# Patient Record
Sex: MALE | Race: ASIAN | NOT HISPANIC OR LATINO | Employment: UNEMPLOYED | URBAN - METROPOLITAN AREA
[De-identification: names, ages, dates, MRNs, and addresses within clinical notes are randomized per-mention and may not be internally consistent; named-entity substitution may affect disease eponyms.]

---

## 2020-09-09 ENCOUNTER — APPOINTMENT (OUTPATIENT)
Dept: CT IMAGING | Facility: CLINIC | Age: 45
DRG: 683 | End: 2020-09-09
Attending: EMERGENCY MEDICINE

## 2020-09-09 ENCOUNTER — HOSPITAL ENCOUNTER (INPATIENT)
Facility: CLINIC | Age: 45
LOS: 4 days | Discharge: HOME | DRG: 683 | End: 2020-09-13
Attending: EMERGENCY MEDICINE | Admitting: INTERNAL MEDICINE

## 2020-09-09 ENCOUNTER — OFFICE VISIT (OUTPATIENT)
Dept: URGENT CARE | Facility: URGENT CARE | Age: 45
End: 2020-09-09

## 2020-09-09 VITALS
OXYGEN SATURATION: 100 % | SYSTOLIC BLOOD PRESSURE: 160 MMHG | WEIGHT: 119.4 LBS | TEMPERATURE: 97.5 F | HEART RATE: 70 BPM | DIASTOLIC BLOOD PRESSURE: 140 MMHG

## 2020-09-09 DIAGNOSIS — I16.0 HYPERTENSIVE URGENCY: ICD-10-CM

## 2020-09-09 DIAGNOSIS — N19 RENAL FAILURE, UNSPECIFIED CHRONICITY: ICD-10-CM

## 2020-09-09 DIAGNOSIS — I10 HYPERTENSION, UNSPECIFIED TYPE: Primary | ICD-10-CM

## 2020-09-09 LAB
ANION GAP SERPL CALCULATED.3IONS-SCNC: 11 MMOL/L (ref 3–14)
ANION GAP SERPL CALCULATED.3IONS-SCNC: 5 MMOL/L (ref 3–14)
BASOPHILS # BLD AUTO: 0 10E9/L (ref 0–0.2)
BASOPHILS NFR BLD AUTO: 0.2 %
BUN SERPL-MCNC: 54 MG/DL (ref 7–30)
BUN SERPL-MCNC: 56 MG/DL (ref 7–30)
CALCIUM SERPL-MCNC: 8.4 MG/DL (ref 8.5–10.1)
CALCIUM SERPL-MCNC: 8.9 MG/DL (ref 8.5–10.1)
CHLORIDE SERPL-SCNC: 105 MMOL/L (ref 94–109)
CHLORIDE SERPL-SCNC: 107 MMOL/L (ref 94–109)
CO2 SERPL-SCNC: 23 MMOL/L (ref 20–32)
CO2 SERPL-SCNC: 28 MMOL/L (ref 20–32)
CREAT SERPL-MCNC: 5.39 MG/DL (ref 0.66–1.25)
CREAT SERPL-MCNC: 5.49 MG/DL (ref 0.66–1.25)
DIFFERENTIAL METHOD BLD: ABNORMAL
EOSINOPHIL # BLD AUTO: 0.2 10E9/L (ref 0–0.7)
EOSINOPHIL NFR BLD AUTO: 2.1 %
ERYTHROCYTE [DISTWIDTH] IN BLOOD BY AUTOMATED COUNT: 12.2 % (ref 10–15)
GFR SERPL CREATININE-BSD FRML MDRD: 11 ML/MIN/{1.73_M2}
GFR SERPL CREATININE-BSD FRML MDRD: 12 ML/MIN/{1.73_M2}
GLUCOSE SERPL-MCNC: 108 MG/DL (ref 70–99)
GLUCOSE SERPL-MCNC: 167 MG/DL (ref 70–99)
HCT VFR BLD AUTO: 32.4 % (ref 40–53)
HGB BLD-MCNC: 10.7 G/DL (ref 13.3–17.7)
IMM GRANULOCYTES # BLD: 0 10E9/L (ref 0–0.4)
IMM GRANULOCYTES NFR BLD: 0.2 %
LYMPHOCYTES # BLD AUTO: 2.4 10E9/L (ref 0.8–5.3)
LYMPHOCYTES NFR BLD AUTO: 30.2 %
MCH RBC QN AUTO: 27.9 PG (ref 26.5–33)
MCHC RBC AUTO-ENTMCNC: 33 G/DL (ref 31.5–36.5)
MCV RBC AUTO: 84 FL (ref 78–100)
MONOCYTES # BLD AUTO: 0.4 10E9/L (ref 0–1.3)
MONOCYTES NFR BLD AUTO: 5.5 %
NEUTROPHILS # BLD AUTO: 5 10E9/L (ref 1.6–8.3)
NEUTROPHILS NFR BLD AUTO: 61.8 %
NRBC # BLD AUTO: 0 10*3/UL
NRBC BLD AUTO-RTO: 0 /100
PLATELET # BLD AUTO: 185 10E9/L (ref 150–450)
POTASSIUM SERPL-SCNC: 3.9 MMOL/L (ref 3.4–5.3)
POTASSIUM SERPL-SCNC: 4.5 MMOL/L (ref 3.4–5.3)
RBC # BLD AUTO: 3.84 10E12/L (ref 4.4–5.9)
SODIUM SERPL-SCNC: 138 MMOL/L (ref 133–144)
SODIUM SERPL-SCNC: 141 MMOL/L (ref 133–144)
TROPONIN I SERPL-MCNC: <0.015 UG/L (ref 0–0.04)
WBC # BLD AUTO: 8.1 10E9/L (ref 4–11)

## 2020-09-09 PROCEDURE — 99285 EMERGENCY DEPT VISIT HI MDM: CPT | Mod: 25

## 2020-09-09 PROCEDURE — 93005 ELECTROCARDIOGRAM TRACING: CPT

## 2020-09-09 PROCEDURE — 70450 CT HEAD/BRAIN W/O DYE: CPT

## 2020-09-09 PROCEDURE — 36415 COLL VENOUS BLD VENIPUNCTURE: CPT | Performed by: FAMILY MEDICINE

## 2020-09-09 PROCEDURE — 80048 BASIC METABOLIC PNL TOTAL CA: CPT | Performed by: FAMILY MEDICINE

## 2020-09-09 PROCEDURE — 85025 COMPLETE CBC W/AUTO DIFF WBC: CPT | Performed by: EMERGENCY MEDICINE

## 2020-09-09 PROCEDURE — 80048 BASIC METABOLIC PNL TOTAL CA: CPT | Performed by: EMERGENCY MEDICINE

## 2020-09-09 PROCEDURE — 25000128 H RX IP 250 OP 636: Performed by: EMERGENCY MEDICINE

## 2020-09-09 PROCEDURE — 99223 1ST HOSP IP/OBS HIGH 75: CPT | Mod: AI | Performed by: INTERNAL MEDICINE

## 2020-09-09 PROCEDURE — C9803 HOPD COVID-19 SPEC COLLECT: HCPCS

## 2020-09-09 PROCEDURE — 84484 ASSAY OF TROPONIN QUANT: CPT | Performed by: EMERGENCY MEDICINE

## 2020-09-09 PROCEDURE — U0003 INFECTIOUS AGENT DETECTION BY NUCLEIC ACID (DNA OR RNA); SEVERE ACUTE RESPIRATORY SYNDROME CORONAVIRUS 2 (SARS-COV-2) (CORONAVIRUS DISEASE [COVID-19]), AMPLIFIED PROBE TECHNIQUE, MAKING USE OF HIGH THROUGHPUT TECHNOLOGIES AS DESCRIBED BY CMS-2020-01-R: HCPCS | Performed by: EMERGENCY MEDICINE

## 2020-09-09 PROCEDURE — 96374 THER/PROPH/DIAG INJ IV PUSH: CPT

## 2020-09-09 PROCEDURE — 12000000 ZZH R&B MED SURG/OB

## 2020-09-09 PROCEDURE — 99207 ZZC OFFICE-HOSPITAL ADMIT: CPT | Performed by: FAMILY MEDICINE

## 2020-09-09 RX ORDER — LIDOCAINE 40 MG/G
CREAM TOPICAL
Status: DISCONTINUED | OUTPATIENT
Start: 2020-09-09 | End: 2020-09-13 | Stop reason: HOSPADM

## 2020-09-09 RX ORDER — SODIUM CHLORIDE, SODIUM LACTATE, POTASSIUM CHLORIDE, CALCIUM CHLORIDE 600; 310; 30; 20 MG/100ML; MG/100ML; MG/100ML; MG/100ML
INJECTION, SOLUTION INTRAVENOUS CONTINUOUS
Status: DISCONTINUED | OUTPATIENT
Start: 2020-09-10 | End: 2020-09-10

## 2020-09-09 RX ORDER — ACETAMINOPHEN 325 MG/1
325-650 TABLET ORAL EVERY 6 HOURS PRN
Status: ON HOLD | COMMUNITY
End: 2021-05-09

## 2020-09-09 RX ORDER — LABETALOL HYDROCHLORIDE 5 MG/ML
20 INJECTION, SOLUTION INTRAVENOUS ONCE
Status: COMPLETED | OUTPATIENT
Start: 2020-09-09 | End: 2020-09-09

## 2020-09-09 RX ORDER — HEPARIN SODIUM 5000 [USP'U]/.5ML
5000 INJECTION, SOLUTION INTRAVENOUS; SUBCUTANEOUS EVERY 12 HOURS
Status: DISCONTINUED | OUTPATIENT
Start: 2020-09-10 | End: 2020-09-13 | Stop reason: HOSPADM

## 2020-09-09 RX ORDER — HYDRALAZINE HYDROCHLORIDE 20 MG/ML
10 INJECTION INTRAMUSCULAR; INTRAVENOUS EVERY 4 HOURS PRN
Status: DISCONTINUED | OUTPATIENT
Start: 2020-09-09 | End: 2020-09-10

## 2020-09-09 RX ORDER — ONDANSETRON 4 MG/1
4 TABLET, ORALLY DISINTEGRATING ORAL EVERY 6 HOURS PRN
Status: DISCONTINUED | OUTPATIENT
Start: 2020-09-09 | End: 2020-09-13 | Stop reason: HOSPADM

## 2020-09-09 RX ORDER — NALOXONE HYDROCHLORIDE 0.4 MG/ML
.1-.4 INJECTION, SOLUTION INTRAMUSCULAR; INTRAVENOUS; SUBCUTANEOUS
Status: DISCONTINUED | OUTPATIENT
Start: 2020-09-09 | End: 2020-09-13 | Stop reason: HOSPADM

## 2020-09-09 RX ORDER — ACETAMINOPHEN 650 MG/1
650 SUPPOSITORY RECTAL EVERY 4 HOURS PRN
Status: DISCONTINUED | OUTPATIENT
Start: 2020-09-09 | End: 2020-09-13 | Stop reason: HOSPADM

## 2020-09-09 RX ORDER — ACETAMINOPHEN 325 MG/1
650 TABLET ORAL EVERY 4 HOURS PRN
Status: DISCONTINUED | OUTPATIENT
Start: 2020-09-09 | End: 2020-09-13 | Stop reason: HOSPADM

## 2020-09-09 RX ORDER — ONDANSETRON 2 MG/ML
4 INJECTION INTRAMUSCULAR; INTRAVENOUS EVERY 6 HOURS PRN
Status: DISCONTINUED | OUTPATIENT
Start: 2020-09-09 | End: 2020-09-13 | Stop reason: HOSPADM

## 2020-09-09 RX ORDER — AMLODIPINE BESYLATE 5 MG/1
5 TABLET ORAL DAILY
Status: DISCONTINUED | OUTPATIENT
Start: 2020-09-10 | End: 2020-09-12

## 2020-09-09 RX ORDER — LISINOPRIL 5 MG/1
5 TABLET ORAL DAILY
Qty: 30 TABLET | Refills: 1 | Status: ON HOLD | OUTPATIENT
Start: 2020-09-09 | End: 2020-09-12

## 2020-09-09 RX ORDER — NAPROXEN SODIUM 220 MG
220 TABLET ORAL 2 TIMES DAILY PRN
Status: ON HOLD | COMMUNITY
End: 2020-09-11

## 2020-09-09 RX ADMIN — LABETALOL HYDROCHLORIDE 10 MG: 5 INJECTION, SOLUTION INTRAVENOUS at 22:20

## 2020-09-09 SDOH — HEALTH STABILITY: MENTAL HEALTH: HOW OFTEN DO YOU HAVE A DRINK CONTAINING ALCOHOL?: NEVER

## 2020-09-09 ASSESSMENT — ENCOUNTER SYMPTOMS
SPEECH DIFFICULTY: 0
HEADACHES: 1
MYALGIAS: 1
WEAKNESS: 0
NUMBNESS: 0
ABDOMINAL PAIN: 0
SHORTNESS OF BREATH: 0
TROUBLE SWALLOWING: 0

## 2020-09-09 ASSESSMENT — PAIN SCALES - GENERAL: PAINLEVEL: SEVERE PAIN (6)

## 2020-09-09 NOTE — PROGRESS NOTES
"SUBJECTIVE:   Johnny Bingham is a 44 year old male presenting with a chief complaint of   Chief Complaint   Patient presents with     Headache     last week     Eye Problem     EYE PAIN      Hx obtained from a [place] .       New patient to our clinic.       44-year-old male presenting today with concern over headache, \"tired eyes\" and shoulder pain.  The shoulder pain appears to be along the trapezius area bilaterally seems to be consistent over the past 7 days and not changing much in character.  He has been using Tylenol and Aleve.Also noted a extremely high BP of 249/155. Has not seen a doctor in at least November for \"drinking too much alcohol\"      Does not have health insurance until November.       Denies ETOH   Quit smoking 6 months ago -- over twenty years.   Does drink coffee     Denies drug use or alcohol.     Denies any history of allergies      Review of Systems    History reviewed. No pertinent past medical history.  History reviewed. No pertinent family history.  No current outpatient medications on file.     Social History     Tobacco Use     Smoking status: Former Smoker     Smokeless tobacco: Never Used     Tobacco comment: quit 6 months ago    Substance Use Topics     Alcohol use: Never     Frequency: Never         BP (!) 160/140   Pulse 70   Temp 97.5  F (36.4  C) (Tympanic)   Wt 54.2 kg (119 lb 6.4 oz)   SpO2 100%    OBJECTIVE  BP (!) 249/155   Pulse 70   Temp 97.5  F (36.4  C) (Tympanic)   Wt 54.2 kg (119 lb 6.4 oz)   SpO2 100%     Physical Exam  HENT:      Head: Normocephalic and atraumatic.      Right Ear: External ear normal.      Left Ear: External ear normal.      Nose: Nose normal.      Mouth/Throat:      Pharynx: No oropharyngeal exudate.   Eyes:      General: Lids are normal. No scleral icterus.        Right eye: No discharge.         Left eye: No discharge.      Extraocular Movements: Extraocular movements intact.      Conjunctiva/sclera: Conjunctivae normal.     "  Pupils: Pupils are equal, round, and reactive to light.   Neck:      Musculoskeletal: Neck supple.      Thyroid: No thyromegaly.      Trachea: No tracheal deviation.   Cardiovascular:      Rate and Rhythm: Normal rate and regular rhythm.      Heart sounds: Normal heart sounds. No murmur. No friction rub. No gallop.    Pulmonary:      Effort: Pulmonary effort is normal. No respiratory distress.      Breath sounds: Normal breath sounds. No stridor. No wheezing or rales.   Chest:      Chest wall: No tenderness.   Abdominal:      General: Bowel sounds are normal. There is no distension.      Palpations: Abdomen is soft. There is no mass.      Tenderness: There is no abdominal tenderness. There is no guarding or rebound.   Musculoskeletal:         General: No tenderness or deformity.      Comments: Muscle tenderness bilaterally at the trapezius   Lymphadenopathy:      Cervical: No cervical adenopathy.   Skin:     General: Skin is warm and dry.      Findings: No erythema or rash.   Neurological:      Mental Status: He is alert and oriented to person, place, and time.      Cranial Nerves: No cranial nerve deficit.   Psychiatric:         Judgment: Judgment normal.              ASSESSMENT:    ICD-10-CM    1. Hypertension, unspecified type  I10 lisinopril (ZESTRIL) 5 MG tablet     Basic metabolic panel  (Ca, Cl, CO2, Creat, Gluc, K, Na, BUN)      PLAN:  Primary care follow-up within the week.   I did encourage timely follow-up for any increasing pain directly to the ER.  Encourage daily blood pressure checks from home needed follow-up with a blood pressure remains elevated to him.  Did markedly improve during his stay in the clinic is likely falsely elevated initially due to unknown factors.  Will monitor closely and start him on blood pressure medicine had poor continuity care for many years due to noncompliance.   The patient indicates understanding of these issues and agrees with the plan.   Patient  educational/instructional material provided including reasons for follow-up   Jed Silvestre MD

## 2020-09-10 ENCOUNTER — APPOINTMENT (OUTPATIENT)
Dept: ULTRASOUND IMAGING | Facility: CLINIC | Age: 45
DRG: 683 | End: 2020-09-10
Attending: INTERNAL MEDICINE

## 2020-09-10 LAB
ALBUMIN UR-MCNC: 30 MG/DL
ANION GAP SERPL CALCULATED.3IONS-SCNC: 5 MMOL/L (ref 3–14)
APPEARANCE UR: CLEAR
BILIRUB UR QL STRIP: NEGATIVE
BUN SERPL-MCNC: 52 MG/DL (ref 7–30)
CALCIUM SERPL-MCNC: 8 MG/DL (ref 8.5–10.1)
CHLORIDE SERPL-SCNC: 110 MMOL/L (ref 94–109)
CO2 SERPL-SCNC: 25 MMOL/L (ref 20–32)
COLOR UR AUTO: ABNORMAL
CREAT SERPL-MCNC: 5.37 MG/DL (ref 0.66–1.25)
CREAT UR-MCNC: 40 MG/DL
ERYTHROCYTE [DISTWIDTH] IN BLOOD BY AUTOMATED COUNT: 12.2 % (ref 10–15)
FRACT EXCRET NA UR+SERPL-RTO: 10.7 %
GFR SERPL CREATININE-BSD FRML MDRD: 12 ML/MIN/{1.73_M2}
GLUCOSE SERPL-MCNC: 101 MG/DL (ref 70–99)
GLUCOSE UR STRIP-MCNC: NEGATIVE MG/DL
HCT VFR BLD AUTO: 29 % (ref 40–53)
HGB BLD-MCNC: 9.4 G/DL (ref 13.3–17.7)
HGB UR QL STRIP: ABNORMAL
INTERPRETATION ECG - MUSE: NORMAL
KETONES UR STRIP-MCNC: NEGATIVE MG/DL
LEUKOCYTE ESTERASE UR QL STRIP: NEGATIVE
MCH RBC QN AUTO: 27.4 PG (ref 26.5–33)
MCHC RBC AUTO-ENTMCNC: 32.4 G/DL (ref 31.5–36.5)
MCV RBC AUTO: 85 FL (ref 78–100)
NITRATE UR QL: NEGATIVE
PH UR STRIP: 7 PH (ref 5–7)
PLATELET # BLD AUTO: 164 10E9/L (ref 150–450)
POTASSIUM SERPL-SCNC: 4.7 MMOL/L (ref 3.4–5.3)
RBC # BLD AUTO: 3.43 10E12/L (ref 4.4–5.9)
RBC #/AREA URNS AUTO: 26 /HPF (ref 0–2)
SODIUM SERPL-SCNC: 140 MMOL/L (ref 133–144)
SODIUM UR-SCNC: 110 MMOL/L
SOURCE: ABNORMAL
SP GR UR STRIP: 1.01 (ref 1–1.03)
UROBILINOGEN UR STRIP-MCNC: NORMAL MG/DL (ref 0–2)
WBC # BLD AUTO: 8 10E9/L (ref 4–11)
WBC #/AREA URNS AUTO: <1 /HPF (ref 0–5)

## 2020-09-10 PROCEDURE — 12000000 ZZH R&B MED SURG/OB

## 2020-09-10 PROCEDURE — 25000128 H RX IP 250 OP 636: Performed by: INTERNAL MEDICINE

## 2020-09-10 PROCEDURE — 99232 SBSQ HOSP IP/OBS MODERATE 35: CPT | Performed by: HOSPITALIST

## 2020-09-10 PROCEDURE — 36415 COLL VENOUS BLD VENIPUNCTURE: CPT | Performed by: INTERNAL MEDICINE

## 2020-09-10 PROCEDURE — 80048 BASIC METABOLIC PNL TOTAL CA: CPT | Performed by: INTERNAL MEDICINE

## 2020-09-10 PROCEDURE — 25000132 ZZH RX MED GY IP 250 OP 250 PS 637: Performed by: INTERNAL MEDICINE

## 2020-09-10 PROCEDURE — 25800030 ZZH RX IP 258 OP 636: Performed by: INTERNAL MEDICINE

## 2020-09-10 PROCEDURE — 84156 ASSAY OF PROTEIN URINE: CPT | Performed by: INTERNAL MEDICINE

## 2020-09-10 PROCEDURE — 81001 URINALYSIS AUTO W/SCOPE: CPT | Performed by: INTERNAL MEDICINE

## 2020-09-10 PROCEDURE — 76770 US EXAM ABDO BACK WALL COMP: CPT

## 2020-09-10 PROCEDURE — 82570 ASSAY OF URINE CREATININE: CPT | Performed by: INTERNAL MEDICINE

## 2020-09-10 PROCEDURE — 25000132 ZZH RX MED GY IP 250 OP 250 PS 637: Performed by: HOSPITALIST

## 2020-09-10 PROCEDURE — 85027 COMPLETE CBC AUTOMATED: CPT | Performed by: INTERNAL MEDICINE

## 2020-09-10 PROCEDURE — 84300 ASSAY OF URINE SODIUM: CPT | Performed by: INTERNAL MEDICINE

## 2020-09-10 RX ORDER — PROCHLORPERAZINE MALEATE 10 MG
10 TABLET ORAL EVERY 6 HOURS PRN
Status: DISCONTINUED | OUTPATIENT
Start: 2020-09-10 | End: 2020-09-13 | Stop reason: HOSPADM

## 2020-09-10 RX ORDER — HYDROCODONE BITARTRATE AND ACETAMINOPHEN 5; 325 MG/1; MG/1
1 TABLET ORAL EVERY 6 HOURS PRN
Status: DISCONTINUED | OUTPATIENT
Start: 2020-09-10 | End: 2020-09-13 | Stop reason: HOSPADM

## 2020-09-10 RX ORDER — HYDRALAZINE HYDROCHLORIDE 20 MG/ML
20 INJECTION INTRAMUSCULAR; INTRAVENOUS EVERY 4 HOURS PRN
Status: DISCONTINUED | OUTPATIENT
Start: 2020-09-10 | End: 2020-09-11

## 2020-09-10 RX ORDER — PROCHLORPERAZINE 25 MG
25 SUPPOSITORY, RECTAL RECTAL EVERY 12 HOURS PRN
Status: DISCONTINUED | OUTPATIENT
Start: 2020-09-10 | End: 2020-09-13 | Stop reason: HOSPADM

## 2020-09-10 RX ADMIN — ACETAMINOPHEN 650 MG: 325 TABLET, FILM COATED ORAL at 21:08

## 2020-09-10 RX ADMIN — HYDROCODONE BITARTRATE AND ACETAMINOPHEN 1 TABLET: 5; 325 TABLET ORAL at 09:30

## 2020-09-10 RX ADMIN — HYDROCODONE BITARTRATE AND ACETAMINOPHEN 1 TABLET: 5; 325 TABLET ORAL at 16:02

## 2020-09-10 RX ADMIN — HEPARIN SODIUM 5000 UNITS: 5000 INJECTION, SOLUTION INTRAVENOUS; SUBCUTANEOUS at 00:04

## 2020-09-10 RX ADMIN — HEPARIN SODIUM 5000 UNITS: 5000 INJECTION, SOLUTION INTRAVENOUS; SUBCUTANEOUS at 12:47

## 2020-09-10 RX ADMIN — ACETAMINOPHEN 650 MG: 325 TABLET, FILM COATED ORAL at 14:06

## 2020-09-10 RX ADMIN — HYDRALAZINE HYDROCHLORIDE 20 MG: 20 INJECTION INTRAMUSCULAR; INTRAVENOUS at 21:08

## 2020-09-10 RX ADMIN — HYDRALAZINE HYDROCHLORIDE 20 MG: 20 INJECTION INTRAMUSCULAR; INTRAVENOUS at 00:51

## 2020-09-10 RX ADMIN — SODIUM CHLORIDE, POTASSIUM CHLORIDE, SODIUM LACTATE AND CALCIUM CHLORIDE: 600; 310; 30; 20 INJECTION, SOLUTION INTRAVENOUS at 00:05

## 2020-09-10 RX ADMIN — ONDANSETRON 4 MG: 2 INJECTION INTRAMUSCULAR; INTRAVENOUS at 21:48

## 2020-09-10 RX ADMIN — AMLODIPINE BESYLATE 5 MG: 5 TABLET ORAL at 08:48

## 2020-09-10 RX ADMIN — SODIUM CHLORIDE, POTASSIUM CHLORIDE, SODIUM LACTATE AND CALCIUM CHLORIDE: 600; 310; 30; 20 INJECTION, SOLUTION INTRAVENOUS at 10:27

## 2020-09-10 RX ADMIN — ONDANSETRON 4 MG: 2 INJECTION INTRAMUSCULAR; INTRAVENOUS at 01:15

## 2020-09-10 RX ADMIN — HYDROCODONE BITARTRATE AND ACETAMINOPHEN 1 TABLET: 5; 325 TABLET ORAL at 22:02

## 2020-09-10 ASSESSMENT — ACTIVITIES OF DAILY LIVING (ADL)
RETIRED_EATING: 0-->INDEPENDENT
TOILETING: 0-->INDEPENDENT
ADLS_ACUITY_SCORE: 10
ADLS_ACUITY_SCORE: 10
BATHING: 0-->INDEPENDENT
ADLS_ACUITY_SCORE: 15
ADLS_ACUITY_SCORE: 16
RETIRED_COMMUNICATION: 0-->UNDERSTANDS/COMMUNICATES WITHOUT DIFFICULTY
SWALLOWING: 0-->SWALLOWS FOODS/LIQUIDS WITHOUT DIFFICULTY
ADLS_ACUITY_SCORE: 10
DRESS: 0-->INDEPENDENT
ADLS_ACUITY_SCORE: 15

## 2020-09-10 NOTE — ED NOTES
[facility]  ED Nurse Handoff Report    ED Chief complaint: Headache and Hypertension      ED Diagnosis:   Final diagnoses:   Hypertensive urgency   Renal failure, unspecified chronicity       Code Status: to be assessed by admitting provider     Allergies: No Known Allergies    Patient Story: Patient arrives to the ED through triage with complaints of a headache and stated that his arms were abnormal. Upon arrival patients blood pressure was 254/172. Patient states that he has been getting intermittent headaches for a while and went to his clinic to get a check up today. Labs were drawn today and he was found to be in renal failure   Focused Assessment:    Resp: WDL   Cardiac: very hypertensive with a BP in the Mid 200s systolic and mid to upper 100s diastolic.   Neuro: intermittent headaches   GI: WDL   : patient is found to be in renal failure based on his lab results     Treatments and/or interventions provided: Meds   Patient's response to treatments and/or interventions: small decrease in blood pressure     To be done/followed up on inpatient unit:  control BP, interventions for abnormal labs, and continue to monitor     Does this patient have any cognitive concerns?: none     Activity level - Baseline/Home:  Independent  Activity Level - Current:   Independent    Patient's Preferred language: Irish   Needed?: yes-  Swedish     Isolation: None  Infection: Not Applicable  Bariatric?: No    Vital Signs:   Vitals:    09/09/20 2112   BP: (!) 254/172   Pulse: 78   Resp: 18   Temp: 98.8  F (37.1  C)   TempSrc: Oral   SpO2: 100%       Cardiac Rhythm:     Was the PSS-3 completed:   Yes  What interventions are required if any?               Family Comments: none   OBS brochure/video discussed/provided to patient/family: N/A              Name of person given brochure if not patient: n.a               Relationship to patient: n.a     For the majority of the shift this patient's  behavior was Green.   Behavioral interventions performed were n.a .    ED NURSE PHONE NUMBER: *[id number]

## 2020-09-10 NOTE — ED PROVIDER NOTES
History     Chief Complaint:  Headache and Hypertension       HPI  Johnny Bingham is a 44 year old year old male who presents for evaluation of headache and hypertension. The patient had a headache for the past week and went to the clinic earlier today for that headache, and the doctor told him that he should present to the ED out of concern for his kidneys. He has not had hypertension issues before today and has not been treated for this before. He states the headache is intermittent, and he also notes muscle pain in his shoulders that is also intermittent for the same amount of time. He also says his vision can get blurry when his headache is present. He also notes that he did have nausea at the first onset of the headache but nothing since. He says he does have to wake up in the middle of the night to go to the bathroom. Right now in the ED the patient says he feels fine and has no headache or other symptoms. Of note, he did take Lisinopril around 4pm. The patient denies chest pain, shortness of breath, abdominal pain, numbness or tingling, weakness, balance issues, slurred speech, or trouble swallowing.      Allergies:  No Known Drug Allergies      Medications:   Lisinopril      Medical History:   Past medical history reviewed. No pertinent medical history.      Surgical History   Surgical history reviewed. No pertinent surgical history.      Family History:   Family history reviewed. No pertinent family history.      Social History:  Smoking Status: Former Smoker    Type: Cigarettes    Quit 6 months ago  Smokeless Tobacco: Never Used  Alcohol Use: Negative  Drug Use: Negative  Primary Care: No Ref-Primary, Physician         Review of Systems   HENT: Negative for trouble swallowing.    Eyes: Positive for visual disturbance.   Respiratory: Negative for shortness of breath.    Cardiovascular: Negative for chest pain.   Gastrointestinal: Negative for abdominal pain.   Genitourinary: Positive for urgency.    Musculoskeletal: Positive for myalgias (shoulders). Negative for gait problem.   Neurological: Positive for headaches. Negative for speech difficulty, weakness and numbness.   All other systems reviewed and are negative.    Physical Exam     Patient Vitals for the past 24 hrs:   BP Temp Temp src Pulse Resp SpO2   09/09/20 2112 (!) 254/172 98.8  F (37.1  C) Oral 78 18 100 %      Physical Exam  SKIN:  Warm, dry.    HEMATOLOGIC/IMMUNOLOGIC/LYMPHATIC:  No pallor.  No extremity edema.  HENT: No jugular venous distention.  EYES:  Conjunctivae normal.  Normal extraocular motion.  Pupils equal round and reactive to light.  Visual fields intact.  CARDIOVASCULAR:  Regular rate and rhythm.  No murmur.  No cardiac rub.  RESPIRATORY:  No respiratory distress, breath sounds equal and normal.  GASTROINTESTINAL:  Soft, nontender abdomen with active bowel sounds.  No palpable mass.  No distention.  MUSCULOSKELETAL: Normal body habitus.  NEUROLOGIC:  Alert, conversant.  Oriented to self place and time.  No aphasia or dysarthria.  No gross motor deficit.  No gross tactile sensory deficit.  No limb ataxia.  Normal gait.  PSYCHIATRIC:  Normal mood.    Emergency Department Course     ECG:  ECG taken at 2227, ECG read at 2233  Normal sinus rhythm  Minimal voltage criteria for LVH, may be normal variant  Septal infarct, age undetermined  Abnormal ECG  Rate 63 bpm. CA interval 168 ms. QRS duration 80 ms. QT/QTc 480/491 ms. P-R-T axes 66 71 43.    Imaging:  Radiology results were communicated with the patient who voiced understanding of the findings.    CT Head w/o Contrast  No evidence of acute intracranial hemorrhage, mass, or  herniation.    RIGOBERTO ZAZUETA MD    Reading per radiology     Laboratory:  Laboratory findings were communicated with the patient who voiced understanding of the findings.    COVID-19 Virus (Coronavirus), PCR NP Swab: pending      Troponin (Collected 2120): <0.015    CBC: WBC 8.1, HGB 10.7 (L),   BMP:  Glucose 167 (H), BUN 54 (H), GFR 12 (L), Calcium 8.4 (L) (Creatinine 5.39 (H)) o/w WNL     Interventions:   2220 Labetalol 10 mg IV    Emergency Department Course:    2108 Nursing notes and vitals reviewed.    2114 I performed an exam of the patient as documented above.     2120 IV was inserted and blood was drawn for laboratory testing, results above.    2135 The patient was sent for CT while in the emergency department, results above.     2220 I consulted with Dr. Mays of the hospitalist services. They are in agreement to accept the patient for admission. Findings and plan explained to the Patient who consents to admission. Discussed the patient with Dr. Mays, who will admit the patient to a [facility] bed for further monitoring, evaluation, and treatment.    2227 EKG obtained as noted above.    2237 A nares sample was obtained for laboratory testing as documented above.    Impression & Plan     Medical Decision Making:  This patient presents after a clinic visit today where it was noted he was very hypertensive, subsequently started on lisinopril. The patient arrived here after he was referred to the emergency department due to abnormal lab work which revealed renal failure. No history of this for the patient so assuming it is relatively acute. Likely secondary to the out of control blood pressure. When I was interviewing the patient he had no symptoms. However given his renal failure in the context of out of control blood pressure he will be admitted for blood pressure management and treatment of the renal failure. Testing was reassuring.    Diagnosis:     ICD-10-CM    1. Hypertensive urgency  I16.0 Troponin I   2. Renal failure, unspecified chronicity  N19         Disposition:  Admitted to Dr. Mays.    Discharge Medications:  New Prescriptions    No medications on file       Scribe Disclosure:  Lali BOLAND, am serving as a scribe at 9:09 PM on 9/9/2020 to document services personally performed by  Lupillo Alejo MD based on my observations and the provider's statements to me.      Lupillo Alejo MD  09/10/20 8133

## 2020-09-10 NOTE — PROVIDER NOTIFICATION
MD Notification    Notified Person: MD    Notified Person Name: Perfecto    Notification Date/Time: September 10, 2020 9:00 AM    Notification Interaction: paged    Purpose of Notification: Pt having headache 3-4/10 states tyl doesn't help. Can we try something else? Norco?    Orders Received: Norco Q 6 hrs    Comments:

## 2020-09-10 NOTE — H&P
[facility]    History and Physical - Hospitalist Service       Date of Admission:  9/9/2020    Assessment & Plan   Johnny Bingham is a 44 year old male who does not normally see a physician who was sent in to the ED from clinic on 9/9/2020 with acute renal failure and malignant hypertension.      Malignant hypertension   For the past week the patient has had issues with a headache and light headedness.  This prompted him to come in to the clinic this afternoon.  There he was found to have blood pressures in the 249/155.  At that time they chelsey labs and started Lisinopril which he took his first dose at 1600 today.  Given the ARF below he was sent in to the ED and upon arrival his blood pressures were still in the 200s.    - Admission to medical bed on telemetry  - Goal blood pressure for the next 24 hours is around 180s systolically  - Hydralazine PRN for SBP >100  - Started Norvasc 5 mg.  I highly doubt this will get the patient to normotensive but he likely has had chronically elevated blood pressures as he does not doctor and dropping him near normal will likely making him symptomatic     Acute renal failure  Unclear etiology at this time.  May be related to the malignant hypertension.  Found on BMP in clinic which prompted his presentation in the ED.  Cr in the ED was 5.39 and we have no previous values available for comparison.  Supposedly still maintaining good urine output.  K is 3.9   - Continue to monitor daily  - IVF with LR at 100 mL/hr  - FeNa ordered  - Nephrology consulted and appreciate their recommendations       Diet: Regular  DVT Prophylaxis: Heparin SQ  Miles Catheter: not present  Code Status: Full code         Disposition Plan   Expected discharge: TBD, recommended to prior living arrangement once evaluation complete, blood pressures controlled and renal function improved.  Entered: Vipin Mays DO 09/09/2020, 10:36 PM     The patient's care was discussed with the  Patient and ED provider.    Vipin Mays DO  [facility]    ______________________________________________________________________    Chief Complaint   Headache    History is obtained from the patient through the jabber and is limited due to language barrier    History of Present Illness   Johnny Bingham is a 44 year old male who does not normally see a physician who was sent in to the ED from clinic on 9/9/2020 for acute renal failure and malignant hypertension.  For the past week the patient has had issues with a headache and light headedness.  This prompted him to come in to the clinic this afternoon.  There he was found to have a blood pressure of 249/155.  At that time they chelsey labs and started Lisinopril which he took his first dose at 1600 today. After his BMP showed acute renal failure he was instructed to come in to the ED.  Patient also does note some blurred vision earlier with his headache.  Upon arrival to the ED the patient was no longer symptomatic and denied a headache on my evaluation.  He denies any fevers, chills, cough, sore throat, chest pain, SOB, abdominal pain, N/V/D, difficulties with urination, leg pain, leg swelling, weakness, numbness/tingling.    Review of Systems    The 10 point Review of Systems is negative other than noted in the HPI    Past Medical History    I have reviewed this patient's medical history and updated it with pertinent information if needed.   No significant past medical history    Past Surgical History   I have reviewed this patient's surgical history and updated it with pertinent information if needed.  No prior surgeries     Social History   I have reviewed this patient's social history and updated it with pertinent information if needed.  Was a smoker and drank alcohol until 7-8 months ago     Family History   Patient denied any significant family history     Prior to Admission Medications   Prior to Admission Medications   Prescriptions  Last Dose Informant Patient Reported? Taking?   lisinopril (ZESTRIL) 5 MG tablet   No No   Sig: Take 1 tablet (5 mg) by mouth daily      Facility-Administered Medications: None     Allergies   No Known Allergies    Physical Exam   Vital Signs: Temp: 98.8  F (37.1  C) Temp src: Oral BP: (!) 254/172 Pulse: 78   Resp: 18 SpO2: 100 % O2 Device: None (Room air)    Weight: 0 lbs 0 oz    General Appearance: Resting comfortably.  NAD   Eyes: EOMI.  Normal conjuctiva  HEENT: NC/AT.  Moist mucous membranes  Respiratory: Clear to auscultation.  No respiratory distress  Cardiovascular: RRR.  No murmurs  GI: Bowel sounds present. Non-tender  Skin: No rashes.  No cyanosis  Musculoskeletal: No edema.  No calf tenderness  Neurologic: No focal deficits. CN appear intact  Psychiatric: Alert and pleasant    Data   Data reviewed today: I reviewed all medications, new labs and imaging results over the last 24 hours. I personally reviewed CT head results as below    Recent Labs   Lab 09/09/20 2120 09/09/20  1453   WBC 8.1  --    HGB 10.7*  --    MCV 84  --      --     141   POTASSIUM 3.9 4.5   CHLORIDE 105 107   CO2 28 23   BUN 54* 56*   CR 5.39* 5.49*   ANIONGAP 5 11   BEAU 8.4* 8.9   * 108*   TROPI <0.015  --      Recent Results (from the past 24 hour(s))   CT Head w/o Contrast    Narrative    CT SCAN OF THE HEAD WITHOUT CONTRAST   9/9/2020 9:47 PM     HISTORY: Uncontrolled blood pressure, headache.    TECHNIQUE: Axial images of the head and coronal reformations without  IV contrast material. Radiation dose for this scan was reduced using  automated exposure control, adjustment of the mA and/or kV according  to patient size, or iterative reconstruction technique.    COMPARISON: None.    FINDINGS: There is no evidence of intracranial hemorrhage, mass, acute  infarct or anomaly. The ventricles are normal in size, shape and  configuration. The brain parenchyma and subarachnoid spaces are  normal.     The visualized  portions of the sinuses and mastoids appear normal. The  bony calvarium and bones of the skull base appear intact.       Impression    IMPRESSION: No evidence of acute intracranial hemorrhage, mass, or  herniation.      RIGOBERTO ZAZUETA MD

## 2020-09-10 NOTE — PHARMACY-ADMISSION MEDICATION HISTORY
Pharmacy Medication History  Admission medication history interview status for the 9/9/2020  admission is complete. See EPIC admission navigator for prior to admission medications     Medication history sources: Patient and Lao interpretor  Medication history source reliability: Good  Adherence assessment: Good    Significant changes made to the medication list:  Added: PRN Tylenol and Aleve    Additional medication history information:   Used Lao interpretor to obtain medication list from patient.     Medication reconciliation completed by provider prior to medication history? Yes    Time spent in this activity: 15 mins      Prior to Admission medications    Medication Sig Last Dose Taking? Auth Provider   acetaminophen (TYLENOL) 325 MG tablet Take 325-650 mg by mouth every 6 hours as needed for mild pain  at prn Yes Unknown, Entered By History   lisinopril (ZESTRIL) 5 MG tablet Take 1 tablet (5 mg) by mouth daily 9/9/2020 at pm Yes Jed Silvestre MD   naproxen sodium (ANAPROX) 220 MG tablet Take 220 mg by mouth 2 times daily as needed for moderate pain  at prn Yes Unknown, Entered By History

## 2020-09-10 NOTE — PROGRESS NOTES
[facility]    Medicine Progress Note - Hospitalist Service       Date of Admission:  9/9/2020  Assessment & Plan     Johnny Bingham is a 44 year old male who does not normally see a physician who was sent in to the ED from clinic on 9/9/2020 with acute renal failure and malignant hypertension.       Malignant hypertension   For the past week the patient has had issues with a headache and light headedness.  This prompted him to come in to the clinic.  There he was found to have blood pressure of 249/155.  At that time they chelsey labs and started Lisinopril which he took his first dose at 1600 on the day of admission.  Given the ARF below he was sent in to the ED and upon arrival his blood pressures were still in the 200s systolic.    - Amlodipine 5 mg PO daily started this morning.  - Received a dose of labetalol and hydralazine overnight with significant improvement in blood pressure.  - Hydralazine PRN for SBP > 180.  - Continue to monitor blood pressure with routine vitals.     Acute renal failure?  Unclear etiology at this time.  May be related to the malignant hypertension.  Found on BMP in clinic which prompted his presentation in the ED.  Cr in the ED was 5.39 and we have no previous values available for comparison.  Supposedly still maintaining good urine output.  - No prior baseline labs available for comparison.  - Continue IVF with LR at 100 mL/hr.  - FeNa 10.7, suggestive of ATN.  - Nephrology consulted, appreciate their assistance.     Diet: Combination Diet Regular Diet Adult    DVT Prophylaxis: Heparin SQ  Miles Catheter: not present  Code Status: Full Code           Disposition Plan   Expected discharge: pending nephrology evaluation and improvement in renal function  Entered: Cayetano Harley MD 09/10/2020, 1:22 PM       The patient's care was discussed with the Bedside Nurse and Patient.    Cayetano Harley MD  Hospitalist Service  [facility]    ______________________________________________________________________    Interval History   Johnny Bingham was seen today with an  through the [employer] alan. He has a mild headache, about a 3/10. Improved after getting Norco earlier today. Denies fevers, chest pain, shortness of breath, nausea, abdominal pain.    Data reviewed today: I reviewed all medications, new labs and imaging results over the last 24 hours. I personally reviewed no images or EKG's today.    Physical Exam   Vital Signs: Temp: 98.3  F (36.8  C) Temp src: Oral BP: 137/82 Pulse: 71   Resp: 16 SpO2: 98 % O2 Device: None (Room air)    Weight: 0 lbs 0 oz  Constitutional: awake, alert, cooperative, no apparent distress  Respiratory: clear to auscultation bilaterally, no crackles or wheezing  Cardiovascular: regular rate and rhythm, normal S1 and S2, no murmur noted  GI: normal bowel sounds, soft, non-distended, non-tender  Skin: warm, dry  Musculoskeletal: no lower extremity pitting edema present  Neurologic: awake, alert, answers questions appropriately    Data   Recent Labs   Lab 09/10/20  0628 09/09/20  2120 09/09/20  1453   WBC 8.0 8.1  --    HGB 9.4* 10.7*  --    MCV 85 84  --     185  --     138 141   POTASSIUM 4.7 3.9 4.5   CHLORIDE 110* 105 107   CO2 25 28 23   BUN 52* 54* 56*   CR 5.37* 5.39* 5.49*   ANIONGAP 5 5 11   BEAU 8.0* 8.4* 8.9   * 167* 108*   TROPI  --  <0.015  --      Medications     lactated ringers 100 mL/hr at 09/10/20 1027       amLODIPine  5 mg Oral Daily     heparin ANTICOAGULANT  5,000 Units Subcutaneous Q12H     sodium chloride (PF)  3 mL Intracatheter Q8H

## 2020-09-10 NOTE — PLAN OF CARE
A/Ox4. Romansh speaking,  needed. VSS ex BP, 206/121 upon arrival from ED, controlled with PRN hydralazine, reduced to 133/88. On tele, sinus bradycardia, HR 55. Reg diet. Up independent in room. LR infusing at 100 ml/hr. Denies pain. Complaint of nausea and dry heaving post ambulation to restroom, relived with PRN zofran. Voiding without difficulty, continent of B/B.

## 2020-09-10 NOTE — PROGRESS NOTES
Care Coordination:    An email was sent to Wm Peck in Finance due to the pt stating he does not have any insurance until November.      Divya Wilson RN, BSN Care Coordinator  [facility]  Mobile: [phone]

## 2020-09-10 NOTE — CONSULTS
"[facility]    RENAL CONSULTATION NOTE    REFERRING MD:  Dr. Mays    REASON FOR CONSULTATION:  Elevated cr    DATE OF CONSULTATION: 09/10/20    SHORTHAND KEY FOR MY NOTES:  c = with, s = without, p = after, a = before, x = except, asx = asymptomatic, tx = transplant or treatment, sx = symptoms or symptomatic, cx = canceled or culture, rxn = reaction, yday = yesterday, nl = normal, abx = antibiotics, fxn = function, dx = diagnosis, dz = disease, m/h = melena/hematochezia, c/d/l/ha = cramping/dizziness/lightheadedness/headache, d/c = discharge or diarrhea/constipation, f/c/n/v = fevers/chills/nausea/vomiting, cp/sob = chest pain/shortness of breath, tbv = total body volume, rxn = reaction, tdc = tunneled dialysis catheter, pta = prior to admission, hd = hemodialysis, pd = peritoneal dialysis, hhd = home hemodialysis    HPI: Johnny Bingham is a 44 year old male c CKD III/IV 2 HTN who was admitted on 9/9/2020 c HAs and found to have severe HTN and DWAYNE.    Pt presented to the [facility] yday c concerns re a HA.  For the past several wks, he has been experiencing HAs, but no assoc vision probs or focal weakness.  He has been taking Aleve (4 pills/d) for the same period of time.  No sx of reflux or GIB.  No f/c/n/v, in general.  His appetite during the day has been ok, but he hasn't been eating dinner frequently.  Denies any metallic taste.  Urinating ok - no d/h/abd or flank pain.      Today, he is feeling ok x for a persistent HA.  It is better than yday.  His vision is unchanged.  His brother has HTN (in [place]) and his father has a \"kidney problem,\" but is not on dialysis.  No h/o kidney stones or recurrent UTIs.      ROS:  A complete review of systems was performed and is x as noted above.    PMH:    No past medical history on file.   CKD III/IV  HTN  EtOH use   Heavy smoker    PSH:    No past surgical history on file.  MEDICATIONS:      amLODIPine  5 mg Oral Daily     heparin " ANTICOAGULANT  5,000 Units Subcutaneous Q12H     sodium chloride (PF)  3 mL Intracatheter Q8H     ALLERGIES:    Allergies as of 09/09/2020     (No Known Allergies)     FH:    No family history on file.   Father - kidney prob  Brother - HTN    SH:    Social History     Socioeconomic History     Marital status: Single     Spouse name: Not on file     Number of children: Not on file     Years of education: Not on file     Highest education level: Not on file   Occupational History     Not on file   Social Needs     Financial resource strain: Not on file     Food insecurity     Worry: Not on file     Inability: Not on file     Transportation needs     Medical: Not on file     Non-medical: Not on file   Tobacco Use     Smoking status: Former Smoker     Smokeless tobacco: Never Used     Tobacco comment: quit 6 months ago    Substance and Sexual Activity     Alcohol use: Never     Frequency: Never     Drug use: Never     Sexual activity: Not on file   Lifestyle     Physical activity     Days per week: Not on file     Minutes per session: Not on file     Stress: Not on file   Relationships     Social connections     Talks on phone: Not on file     Gets together: Not on file     Attends Jew service: Not on file     Active member of club or organization: Not on file     Attends meetings of clubs or organizations: Not on file     Relationship status: Not on file     Intimate partner violence     Fear of current or ex partner: Not on file     Emotionally abused: Not on file     Physically abused: Not on file     Forced sexual activity: Not on file   Other Topics Concern     Not on file   Social History Narrative     Not on file     PHYSICAL EXAM:    BP (!) 158/101 (BP Location: Left arm)   Pulse 70   Temp 98.1  F (36.7  C) (Oral)   Resp 16   SpO2 100%     GENERAL: awake, alert, NAD; pt seen c phone   HEENT:  normocephalic, no gross abnormalities; MMM, dentition is ok; pupils equal, EOMI, no scleral icterus  or conj edema  CV: RRR c 2/6 m, no clicks, gallops, or rubs, no ble edema  RESP: CTA B c good efforts  GI: abd s/nt/nd, BS present; no masses  MUSCULOSKELETAL: extremities nl - no gross deformities noted  SKIN: no suspicious lesions or rashes, dry to touch  NEURO:  strength normal and symmetric  PSYCH: mood good, affect appropriate  LYMPH: no palpable ant/post cervical and supraclavicular adenopathy    LABS:      CBC RESULTS:     Recent Labs   Lab 09/10/20  0628 09/09/20  2120   WBC 8.0 8.1   RBC 3.43* 3.84*   HGB 9.4* 10.7*   HCT 29.0* 32.4*    185     BMP RESULTS:  Recent Labs   Lab 09/10/20  0628 09/09/20  2120 09/09/20  1453    138 141   POTASSIUM 4.7 3.9 4.5   CHLORIDE 110* 105 107   CO2 25 28 23   BUN 52* 54* 56*   CR 5.37* 5.39* 5.49*   * 167* 108*   BEAU 8.0* 8.4* 8.9     INRNo lab results found in last 7 days.     DIAGNOSTICS:  Personally reviewed renal US c Dopplers - R 8.2 x 4 x 3.4 cm c 10 mm cortex, no TOM; L 9.4 x 4.3 x 4.3 cm c 12 mm cortex, no TOM    A/P:  Johnny Bingham is a 44 year old male c severe HTN and DWAYNE/CKD.    1.  DWAYNE/CKD.  Pt prob has CKD from HTN.  His last cr in 10/19 was 2.4 at [facility].  This current episode is likely a combo of ATN from excessive NSAIDs + progression of chronic dz.  No uremic sx.  His TBV is ok.  Urinating ok.  A.  Check UA c micro.  Based on results, we can consider checking serologies and/or renal bx.  B.  Avoid further nephrotoxics.  No NSAIDs.  This has been explained to the pt.  C.  Follow labs, uo, sx daily.    2.  Severe HTN.  Pt presented c very high BP that corrected relatively quickly c minimal intervention.  He still has a little HA, but is not having any blurry vision or focal neuro sx.  Concern is re controlling BP too quickly.  A.  Continue amlod, but hold if SBP < 140.  B.  Follow clinically.    3.  Anemia c low MCV.  Pt prob has fe and EPO def from CKD.  No signs of bleeding.  A.  Check fe studies.  B.  Follow hb, clinically.    4.   FEN.  Electrolytes are ok.  He is on a reg diet, which is ok for now given that his home diet is prob not restricted.    A.  Check full electrolytes in the AM.  B.  Same diet for now.    Thank you for this consultation. We will follow c you.  Please call if any questions.    Attestation:   I have reviewed today's relevant vital signs, notes, medications, labs and imaging.    Sebastian Morton MD  [facility] - Nephrology  [phone]

## 2020-09-10 NOTE — PROGRESS NOTES
RECEIVING UNIT ED HANDOFF REVIEW    ED Nurse Handoff Report was reviewed by: Melisa Trevizo RN on September 9, 2020 at 10:40 PM

## 2020-09-10 NOTE — PLAN OF CARE
Pt A/O x 4. IND in the room. Tolerating diet. Voiding. Headache continues, norco received with improvement. Jabber at the bedside, used for all interaction. UA sent. Will continue to monitor.

## 2020-09-11 LAB
ANION GAP SERPL CALCULATED.3IONS-SCNC: 4 MMOL/L (ref 3–14)
BUN SERPL-MCNC: 50 MG/DL (ref 7–30)
CALCIUM SERPL-MCNC: 8.2 MG/DL (ref 8.5–10.1)
CHLORIDE SERPL-SCNC: 107 MMOL/L (ref 94–109)
CO2 SERPL-SCNC: 27 MMOL/L (ref 20–32)
CREAT SERPL-MCNC: 5.84 MG/DL (ref 0.66–1.25)
CREAT UR-MCNC: 49 MG/DL
ERYTHROCYTE [DISTWIDTH] IN BLOOD BY AUTOMATED COUNT: 12.4 % (ref 10–15)
FERRITIN SERPL-MCNC: 440 NG/ML (ref 26–388)
GFR SERPL CREATININE-BSD FRML MDRD: 11 ML/MIN/{1.73_M2}
GLUCOSE SERPL-MCNC: 99 MG/DL (ref 70–99)
HCT VFR BLD AUTO: 28.3 % (ref 40–53)
HGB BLD-MCNC: 9.3 G/DL (ref 13.3–17.7)
IRON SATN MFR SERPL: 33 % (ref 15–46)
IRON SERPL-MCNC: 85 UG/DL (ref 35–180)
MAGNESIUM SERPL-MCNC: 2.5 MG/DL (ref 1.6–2.3)
MCH RBC QN AUTO: 27.9 PG (ref 26.5–33)
MCHC RBC AUTO-ENTMCNC: 32.9 G/DL (ref 31.5–36.5)
MCV RBC AUTO: 85 FL (ref 78–100)
PHOSPHATE SERPL-MCNC: 4 MG/DL (ref 2.5–4.5)
PLATELET # BLD AUTO: 172 10E9/L (ref 150–450)
POTASSIUM SERPL-SCNC: 4.4 MMOL/L (ref 3.4–5.3)
PROT UR-MCNC: 0.89 G/L
PROT/CREAT 24H UR: 1.82 G/G CR (ref 0–0.2)
RBC # BLD AUTO: 3.33 10E12/L (ref 4.4–5.9)
SARS-COV-2 RNA SPEC QL NAA+PROBE: NOT DETECTED
SODIUM SERPL-SCNC: 138 MMOL/L (ref 133–144)
SPECIMEN SOURCE: NORMAL
TIBC SERPL-MCNC: 255 UG/DL (ref 240–430)
WBC # BLD AUTO: 7.3 10E9/L (ref 4–11)

## 2020-09-11 PROCEDURE — 25000132 ZZH RX MED GY IP 250 OP 250 PS 637: Performed by: INTERNAL MEDICINE

## 2020-09-11 PROCEDURE — 25000132 ZZH RX MED GY IP 250 OP 250 PS 637: Performed by: HOSPITALIST

## 2020-09-11 PROCEDURE — 85027 COMPLETE CBC AUTOMATED: CPT | Performed by: INTERNAL MEDICINE

## 2020-09-11 PROCEDURE — 84100 ASSAY OF PHOSPHORUS: CPT | Performed by: INTERNAL MEDICINE

## 2020-09-11 PROCEDURE — 36415 COLL VENOUS BLD VENIPUNCTURE: CPT | Performed by: INTERNAL MEDICINE

## 2020-09-11 PROCEDURE — 83735 ASSAY OF MAGNESIUM: CPT | Performed by: INTERNAL MEDICINE

## 2020-09-11 PROCEDURE — 25000128 H RX IP 250 OP 636: Performed by: INTERNAL MEDICINE

## 2020-09-11 PROCEDURE — 99232 SBSQ HOSP IP/OBS MODERATE 35: CPT | Performed by: HOSPITALIST

## 2020-09-11 PROCEDURE — 83540 ASSAY OF IRON: CPT | Performed by: INTERNAL MEDICINE

## 2020-09-11 PROCEDURE — 83550 IRON BINDING TEST: CPT | Performed by: INTERNAL MEDICINE

## 2020-09-11 PROCEDURE — 80048 BASIC METABOLIC PNL TOTAL CA: CPT | Performed by: INTERNAL MEDICINE

## 2020-09-11 PROCEDURE — 82728 ASSAY OF FERRITIN: CPT | Performed by: INTERNAL MEDICINE

## 2020-09-11 PROCEDURE — 12000000 ZZH R&B MED SURG/OB

## 2020-09-11 RX ORDER — DIPHENHYDRAMINE HYDROCHLORIDE 50 MG/ML
25 INJECTION INTRAMUSCULAR; INTRAVENOUS EVERY 6 HOURS PRN
Status: DISCONTINUED | OUTPATIENT
Start: 2020-09-11 | End: 2020-09-13 | Stop reason: HOSPADM

## 2020-09-11 RX ORDER — DIPHENHYDRAMINE HCL 25 MG
25 CAPSULE ORAL EVERY 6 HOURS PRN
Status: DISCONTINUED | OUTPATIENT
Start: 2020-09-11 | End: 2020-09-13 | Stop reason: HOSPADM

## 2020-09-11 RX ADMIN — HEPARIN SODIUM 5000 UNITS: 5000 INJECTION, SOLUTION INTRAVENOUS; SUBCUTANEOUS at 01:10

## 2020-09-11 RX ADMIN — AMLODIPINE BESYLATE 5 MG: 5 TABLET ORAL at 14:26

## 2020-09-11 RX ADMIN — HYDROCODONE BITARTRATE AND ACETAMINOPHEN 1 TABLET: 5; 325 TABLET ORAL at 07:26

## 2020-09-11 RX ADMIN — HYDROCODONE BITARTRATE AND ACETAMINOPHEN 1 TABLET: 5; 325 TABLET ORAL at 12:21

## 2020-09-11 RX ADMIN — HEPARIN SODIUM 5000 UNITS: 5000 INJECTION, SOLUTION INTRAVENOUS; SUBCUTANEOUS at 12:21

## 2020-09-11 RX ADMIN — HEPARIN SODIUM 5000 UNITS: 5000 INJECTION, SOLUTION INTRAVENOUS; SUBCUTANEOUS at 23:35

## 2020-09-11 RX ADMIN — DIPHENHYDRAMINE HYDROCHLORIDE 25 MG: 25 CAPSULE ORAL at 23:35

## 2020-09-11 ASSESSMENT — ACTIVITIES OF DAILY LIVING (ADL)
ADLS_ACUITY_SCORE: 10

## 2020-09-11 ASSESSMENT — MIFFLIN-ST. JEOR: SCORE: 1346.65

## 2020-09-11 NOTE — PROGRESS NOTES
[facility]     Renal Progress Note       SHORTHAND KEY FOR MY NOTES:  c = with, s = without, p = after, a = before, x = except, asx = asymptomatic, tx = transplant or treatment, sx = symptoms or symptomatic, cx = canceled or culture, rxn = reaction, yday = yesterday, nl = normal, abx = antibiotics, fxn = function, dx = diagnosis, dz = disease, m/h = melena/hematochezia, c/d/l/ha = cramping/dizziness/lightheadedness/headache, d/c = discharge or diarrhea/constipation, f/c/n/v = fevers/chills/nausea/vomiting, cp/sob = chest pain/shortness of breath, tbv = total body volume, rxn = reaction, tdc = tunneled dialysis catheter, pta = prior to admission, hd = hemodialysis, pd = peritoneal dialysis, hhd = home hemodialysis         Assessment/Plan:     1.  DWAYNE/CKD.  Pt's cr is higher today, which is not surprising since his BP was brought down to the 120-150s.  He does not have any uremic sx.  No need for dialysis at this time.  His urine shows microhematuria and he has < 2 g/g proteinuria - could have IgA.  At this point, no  He wants to follow up in [place] since it[place] closer to his home.  A.  Follow labs, uo, sx.  B.  Check serologies.  C.  Will discuss bx, but unsure what the yield will be since his renal fxn has likely been progressing for some time.  D.  F/u c Dr. Dudley ([facility]) in [place].    2.  HTN.  Pt's BP has been reasonably well controlled since starting amlod.  He required 1 dose of hydral last night bc the BP babita to the 180s.    A.  Amlod 5 mg every day - change parameters to hold for < 110.  B.  Stop hydralazine.    3.  Anemia.  Pt's anemia is due to EPO def.  Fe def is ok.  A.  Follow hb, clinically.    4.  FEN.  Electrolytes are ok.  A.  Renal diet.        Interval History:     Pt's HA has improved and denies any f/c/n/v.  Good uo.  Eating ok c nl taste.      He has questions re if he will require BP meds daily and if he can follow-up closer to his home in [place].            " Medications and Allergies:       amLODIPine  5 mg Oral Daily     heparin ANTICOAGULANT  5,000 Units Subcutaneous Q12H     sodium chloride (PF)  3 mL Intracatheter Q8H     No Known Allergies       Physical Exam:     Vitals were reviewed     , Blood pressure (!) 157/96, pulse 68, temperature 98.3  F (36.8  C), temperature source Oral, resp. rate 16, height 1.676 m (5' 6\"), weight 52.4 kg (115 lb 8 oz), SpO2 98 %.  Wt Readings from Last 3 Encounters:   09/11/20 52.4 kg (115 lb 8 oz)   09/09/20 54.2 kg (119 lb 6.4 oz)     Intake/Output Summary (Last 24 hours) at 9/11/2020 1834  Last data filed at 9/11/2020 1114  Gross per 24 hour   Intake 240 ml   Output 450 ml   Net -210 ml     GENERAL APPEARANCE: pleasant, NAD, alert  HEENT:  eyes/ears/nose/neck grossly normal  RESP: lungs cta b c good efforts, no crackles, rhonchi or wheezes  CV: RRR, nl S1/S2  ABDOMEN: s/nt/nd  EXTREMITIES/SKIN: no ble edema         Data:     CBC RESULTS:     Recent Labs   Lab 09/11/20  0703 09/10/20  0628 09/09/20 2120   WBC 7.3 8.0 8.1   RBC 3.33* 3.43* 3.84*   HGB 9.3* 9.4* 10.7*   HCT 28.3* 29.0* 32.4*    164 185     Basic Metabolic Panel:  Recent Labs   Lab 09/11/20  0703 09/10/20  0628 09/09/20  2120 09/09/20  1453    140 138 141   POTASSIUM 4.4 4.7 3.9 4.5   CHLORIDE 107 110* 105 107   CO2 27 25 28 23   BUN 50* 52* 54* 56*   CR 5.84* 5.37* 5.39* 5.49*   GLC 99 101* 167* 108*   BEAU 8.2* 8.0* 8.4* 8.9     INRNo lab results found in last 7 days.   Attestation:   I have reviewed today's relevant vital signs, notes, medications, labs and imaging.    Sebastian Morton MD  [facility] - Nephrology  [phone]  "

## 2020-09-11 NOTE — PROGRESS NOTES
[facility]    Medicine Progress Note - Hospitalist Service       Date of Admission:  9/9/2020  Assessment & Plan     Johnny Bingham is a 44 year old male who does not normally see a physician who was sent in to the ED from clinic on 9/9/2020 with acute renal failure and malignant hypertension.       Malignant hypertension   For the week prior to admission, the patient had issues with a headache and light headedness.  This prompted him to come in to the clinic.  There he was found to have blood pressure of 249/155.  At that time they chelsey labs and started Lisinopril which he took his first dose at 1600 on the day of admission.  Labs revealed a significantly elevated creatinine and he was sent in to the ED. Upon arrival his blood pressures were still in the 200s systolic.    - Amlodipine 5 mg PO daily started on 9/10/20 with hold parameters.  - PRN labetalol and hydralazine available. Received one dose of PRN hydralazine overnight (9/10/20 at 21:08).  - Blood pressure well controlled this morning at 133/78.  - Continue to monitor blood pressure with routine vitals.     Acute renal failure?  Chronic kidney disease  Unclear etiology at this time.  May be related to the malignant hypertension.  Found on BMP in clinic which prompted his presentation in the ED.  Cr in the ED was 5.39. Patient had been taking Aleve (up to 4 pills per day) for a headache for a few weeks prior to admission.  - Creatinine was 2.39 on 10/6/19 and 1.28 on 2/25/17.  - FeNa 10.7.  - UA with moderate blood and 30 albumin.  - Creatinine up to 5.84 this morning.   - Nephrology consulted, appreciate their assistance.    Headache  - Likely secondary to significantly elevated blood pressures.  - Headache improving over the past few days.  - PRN acetaminophen and hydrocodone available.  - No NSAIDS.    Anemia  - MCV within normal limits.  - Iron studies OK, ferritin slightly elevated.  - No signs of active bleeding.  - Hemoglobin  stable at 9.3 this morning.     Diet: Combination Diet Regular Diet Adult    DVT Prophylaxis: Heparin SQ  Miles Catheter: not present  Code Status: Full Code           Disposition Plan   Expected discharge: pending improvement in renal function and nephrology recommendations  Entered: Cayetano Harley MD 09/11/2020, 9:00 AM       The patient's care was discussed with the Bedside Nurse and Patient.    aCyetano Harley MD  Hospitalist Service  [facility]    ______________________________________________________________________    Interval History   Johnny Bingham feels OK this morning. Has a mild headache, about a 2/10, improved compared to yesterday. No other complaints/concerns. Denies fevers, chest pain, shortness of breath, nausea, abdominal pain. He was seen with an  through the [facility] today.    Data reviewed today: I reviewed all medications, new labs and imaging results over the last 24 hours. I personally reviewed no images or EKG's today.    Physical Exam   Vital Signs: Temp: 98.8  F (37.1  C) Temp src: Oral BP: 133/78 Pulse: 74   Resp: 16 SpO2: 99 % O2 Device: None (Room air)    Weight: 0 lbs 0 oz  Constitutional: awake, alert, cooperative, no apparent distress  Respiratory: clear to auscultation bilaterally, no crackles or wheezing  Cardiovascular: regular rate and rhythm, normal S1 and S2, no murmur noted  GI: normal bowel sounds, soft, non-distended, non-tender  Skin: warm, dry  Musculoskeletal: no lower extremity pitting edema present  Neurologic: awake, alert, oriented to name, place and time    Data   Recent Labs   Lab 09/11/20  0703 09/10/20  0628 09/09/20  2120   WBC 7.3 8.0 8.1   HGB 9.3* 9.4* 10.7*   MCV 85 85 84    164 185    140 138   POTASSIUM 4.4 4.7 3.9   CHLORIDE 107 110* 105   CO2 27 25 28   BUN 50* 52* 54*   CR 5.84* 5.37* 5.39*   ANIONGAP 4 5 5   BEAU 8.2* 8.0* 8.4*   GLC 99 101* 167*   TROPI  --   --  <0.015     Medications       amLODIPine   5 mg Oral Daily     heparin ANTICOAGULANT  5,000 Units Subcutaneous Q12H     sodium chloride (PF)  3 mL Intracatheter Q8H

## 2020-09-11 NOTE — PLAN OF CARE
Jacquelyn used for communication. VSS on RA ex hypertensive in 180s- hydralazine effective. Pt complained of headache- tylenol then norco given with relief. Pt also had nausea with headahce- zofran given with eventual relief but on call MD ordered compazine but pt neveer needed. Voiding adequately. PIV SL. Up independently. Plan to monitor BP and creat.

## 2020-09-11 NOTE — PROGRESS NOTES
Jacquelyn used for communication. VSS on RA ex hypertensive 160's- Dr. Coates decreased parameters so norvasc given.  Pt complained of headache norco given with relief x2.  Voiding urine output decreased, instructed patient to void in urinal for I & O. PIV SL. Up independently. Plan to monitor Crt if remains the same or less may discharge home tomorrow with outpatient follow up.

## 2020-09-12 LAB
ANION GAP SERPL CALCULATED.3IONS-SCNC: 4 MMOL/L (ref 3–14)
BUN SERPL-MCNC: 50 MG/DL (ref 7–30)
CALCIUM SERPL-MCNC: 8.5 MG/DL (ref 8.5–10.1)
CHLORIDE SERPL-SCNC: 104 MMOL/L (ref 94–109)
CK SERPL-CCNC: 172 U/L (ref 30–300)
CO2 SERPL-SCNC: 27 MMOL/L (ref 20–32)
CREAT SERPL-MCNC: 6.01 MG/DL (ref 0.66–1.25)
GFR SERPL CREATININE-BSD FRML MDRD: 10 ML/MIN/{1.73_M2}
GLUCOSE SERPL-MCNC: 91 MG/DL (ref 70–99)
PLATELET # BLD AUTO: 171 10E9/L (ref 150–450)
POTASSIUM SERPL-SCNC: 4.3 MMOL/L (ref 3.4–5.3)
SODIUM SERPL-SCNC: 135 MMOL/L (ref 133–144)

## 2020-09-12 PROCEDURE — 99232 SBSQ HOSP IP/OBS MODERATE 35: CPT | Performed by: INTERNAL MEDICINE

## 2020-09-12 PROCEDURE — 86255 FLUORESCENT ANTIBODY SCREEN: CPT | Performed by: INTERNAL MEDICINE

## 2020-09-12 PROCEDURE — 80048 BASIC METABOLIC PNL TOTAL CA: CPT | Performed by: INTERNAL MEDICINE

## 2020-09-12 PROCEDURE — 25000132 ZZH RX MED GY IP 250 OP 250 PS 637: Performed by: INTERNAL MEDICINE

## 2020-09-12 PROCEDURE — 87340 HEPATITIS B SURFACE AG IA: CPT | Performed by: INTERNAL MEDICINE

## 2020-09-12 PROCEDURE — 86803 HEPATITIS C AB TEST: CPT | Performed by: INTERNAL MEDICINE

## 2020-09-12 PROCEDURE — 86038 ANTINUCLEAR ANTIBODIES: CPT | Performed by: INTERNAL MEDICINE

## 2020-09-12 PROCEDURE — 86060 ANTISTREPTOLYSIN O TITER: CPT | Performed by: INTERNAL MEDICINE

## 2020-09-12 PROCEDURE — 00000402 ZZHCL STATISTIC TOTAL PROTEIN: Performed by: INTERNAL MEDICINE

## 2020-09-12 PROCEDURE — 86160 COMPLEMENT ANTIGEN: CPT | Performed by: INTERNAL MEDICINE

## 2020-09-12 PROCEDURE — 84165 PROTEIN E-PHORESIS SERUM: CPT | Performed by: INTERNAL MEDICINE

## 2020-09-12 PROCEDURE — 86706 HEP B SURFACE ANTIBODY: CPT | Performed by: INTERNAL MEDICINE

## 2020-09-12 PROCEDURE — 36415 COLL VENOUS BLD VENIPUNCTURE: CPT | Performed by: INTERNAL MEDICINE

## 2020-09-12 PROCEDURE — 84166 PROTEIN E-PHORESIS/URINE/CSF: CPT | Performed by: INTERNAL MEDICINE

## 2020-09-12 PROCEDURE — 84999 UNLISTED CHEMISTRY PROCEDURE: CPT | Performed by: INTERNAL MEDICINE

## 2020-09-12 PROCEDURE — 86225 DNA ANTIBODY NATIVE: CPT | Performed by: INTERNAL MEDICINE

## 2020-09-12 PROCEDURE — 82550 ASSAY OF CK (CPK): CPT | Performed by: INTERNAL MEDICINE

## 2020-09-12 PROCEDURE — 85049 AUTOMATED PLATELET COUNT: CPT | Performed by: INTERNAL MEDICINE

## 2020-09-12 PROCEDURE — 12000000 ZZH R&B MED SURG/OB

## 2020-09-12 RX ORDER — AMLODIPINE BESYLATE 2.5 MG/1
2.5 TABLET ORAL DAILY
Status: DISCONTINUED | OUTPATIENT
Start: 2020-09-12 | End: 2020-09-12

## 2020-09-12 RX ORDER — AMLODIPINE BESYLATE 5 MG/1
5 TABLET ORAL 2 TIMES DAILY
Status: DISCONTINUED | OUTPATIENT
Start: 2020-09-12 | End: 2020-09-13 | Stop reason: HOSPADM

## 2020-09-12 RX ORDER — AMLODIPINE BESYLATE 5 MG/1
5 TABLET ORAL 2 TIMES DAILY
Qty: 60 TABLET | Refills: 1 | Status: ON HOLD | OUTPATIENT
Start: 2020-09-12 | End: 2021-05-09

## 2020-09-12 RX ADMIN — AMLODIPINE BESYLATE 5 MG: 5 TABLET ORAL at 21:09

## 2020-09-12 RX ADMIN — AMLODIPINE BESYLATE 5 MG: 5 TABLET ORAL at 17:46

## 2020-09-12 RX ADMIN — AMLODIPINE BESYLATE 5 MG: 5 TABLET ORAL at 08:06

## 2020-09-12 ASSESSMENT — ACTIVITIES OF DAILY LIVING (ADL)
ADLS_ACUITY_SCORE: 10

## 2020-09-12 ASSESSMENT — MIFFLIN-ST. JEOR: SCORE: 1348.92

## 2020-09-12 NOTE — DISCHARGE SUMMARY
[facility]    Discharge Summary  Hospitalist    Date of Admission:  9/9/2020  Date of Discharge:  9/13/2020  Discharging Provider: Willian Escobedo MD    Discharge Diagnoses      Hypertensive urgency   CKD-V  Headache-resolved  Likely anemia of chronic disease    Hospital Course:    Johnny Bingham is a 44 year old male who does not normally see a physician who was sent in to the ED from clinic on 9/9/2020 with acute renal failure and malignant hypertension.       Hypertensive urgency   -for the week prior to admission, the patient had issues with a headache and light headedness. This prompted him to come in to the clinic.  There he was found to have blood pressure of 249/155.  At that time they chelsey labs and started Lisinopril which he took his first dose at 1600 on the day of admission.  Labs revealed a significantly elevated creatinine and he was sent in to the ED. Upon arrival his blood pressures were still in the 200s systolic.    -Amlodipine 5 mg PO daily started on 9/10/20.  Blood pressure was suboptimally controlled therefore amlodipine was increased to 5 mg p.o. twice daily.  On the day of discharge Coreg 3.125 mg p.o. twice daily also added.  The goal is to gradually lower his blood pressure over a week or so.  Eventually will need to target normotension.  He will need close outpatient follow-up with his PCP and titration of his blood pressure medication.    -Blood pressure was still on the higher side however it was felt that his blood pressures was improving in the right trend.  Moreover the idea was to lower his blood pressure over the course of next week or so.  Given his acute kidney injury did not want to overcorrect his blood pressure and further worsen his kidney function.  Patient was very eager to be discharged home.  -He already has a PCP appointment on 9/16, will need close monitoring of blood pressure and titration of his medications.     Chronic kidney disease-V  Unclear  etiology at this time.  May be related to the malignant hypertension.  Found on BMP in clinic which prompted his presentation in the ED.  Cr in the ED was 5.39. Patient had been taking Aleve (up to 4 pills per day) for a headache for a few weeks prior to admission.  -Creatinine was 2.39 on 10/6/19 and 1.28 on 2/25/17.  -Renal ultrasound fairly unremarkable  -Creatinine progressively worse during this hospitalization and peaked at 6.01, slightly better on the day of discharge at 5.6.  -Nephrology followed, he was felt to be euvolemic.  Advised to stop lisinopril.  No NSAID use.  Serologies have been obtained which will need follow-up with his primary nephrologist in the next 1 to 2 weeks.  Recommend rechecking BMP next week at PCPs office.     Headache  -Likely secondary to significantly elevated blood pressures.  -CT head at presentation negative for acute findings  -Headache now resolved     Anemia  -MCV within normal limits.  -Iron studies OK, ferritin slightly elevated.  -No signs of active bleeding.  -Hemoglobin stable at 10.4 on the day of discharge.    Willian Escobedo MD    Significant Results and Procedures   See below    Pending Results     Unresulted Labs Ordered in the Past 30 Days of this Admission     Date and Time Order Name Status Description    9/12/2020 0000 Protein electrophoresis In process     9/12/2020 0000 ANCA IgG by IFA with Reflex to Titer In process     9/12/2020 0000 Anti Nuclear Qi IgG by IFA with Reflex In process     9/12/2020 0000 Antistreptolysin O In process     9/12/2020 0000 Hepatitis B Surface Antibody In process     9/12/2020 0000 Hepatitis B surface antigen In process     9/12/2020 0000 Hepatitis C antibody In process     9/12/2020 0000 Complement C3 In process     9/12/2020 0000 Complement C4 In process     9/12/2020 0000 DNA double stranded antibodies In process     9/11/2020 2347 Protein electrophoresis random urine In process           Code Status   Full Code       Primary  Care Physician   Physician No Ref-Primary    Physical Exam   Temp: 97.4  F (36.3  C) Temp src: Oral BP: (!) 168/104 Pulse: 76   Resp: 14 SpO2: 100 % O2 Device: None (Room air)      Constitutional: AAOX3, NAD  Respiratory: CTA B/L, Normal WOB  Cardiovascular: RRR, No murmur  GI: Soft, Non- tender, BS- normoactive  MSK:  no edema  Neuro: CN- grossly intact, Motor strength 5/5 on all 4 extremities.     Discharge Disposition   Discharged to home  Condition at discharge: Stable    Consultations This Hospital Stay   NEPHROLOGY IP CONSULT  CARE TRANSITION RN/SW IP CONSULT    Time Spent on this Encounter   I, Willian Escobedo MD, personally saw the patient today and spent greater than 30 minutes discharging this patient.    Discharge Orders      Follow-up and recommended labs and tests    Follow up with primary care provider,  within 7 days for hospital follow- up.   F/u nephrology in 1 week     Activity    Your activity upon discharge: activity as tolerated     Full Code     Diet    Follow this diet upon discharge: Orders Placed This Encounter      Combination Diet Regular Diet Adult       Discharge Medications   Current Discharge Medication List      START taking these medications    Details   amLODIPine (NORVASC) 5 MG tablet Take 1 tablet (5 mg) by mouth 2 times daily  Qty: 60 tablet, Refills: 1    Comments: Future refills by PCP  Physician No Ref-Primary with phone number None.  Associated Diagnoses: Hypertensive urgency      carvedilol (COREG) 3.125 MG tablet Take 1 tablet (3.125 mg) by mouth 2 times daily (with meals)  Qty: 60 tablet, Refills: 1    Comments: Future refills by PCP  Associated Diagnoses: Hypertensive urgency         CONTINUE these medications which have NOT CHANGED    Details   acetaminophen (TYLENOL) 325 MG tablet Take 325-650 mg by mouth every 6 hours as needed for mild pain         STOP taking these medications       lisinopril (ZESTRIL) 5 MG tablet Comments:   Reason for Stopping:              Allergies   No Known Allergies  Data   Most Recent 3 CBC's:  Recent Labs   Lab Test 09/13/20  0646 09/12/20  0647 09/11/20  0703 09/10/20  0628 09/09/20 2120   WBC  --   --  7.3 8.0 8.1   HGB 10.4*  --  9.3* 9.4* 10.7*   MCV  --   --  85 85 84   PLT  --  171 172 164 185      Most Recent 3 BMP's:  Recent Labs   Lab Test 09/13/20  0646 09/12/20  0647 09/11/20  0703    135 138   POTASSIUM 4.3 4.3 4.4   CHLORIDE 105 104 107   CO2 25 27 27   BUN 58* 50* 50*   CR 5.64* 6.01* 5.84*   ANIONGAP 7 4 4   BEAU 8.7 8.5 8.2*   GLC 85 91 99     Most Recent 2 LFT's:No lab results found.  Most Recent INR's and Anticoagulation Dosing History:  Anticoagulation Dose History     There is no flowsheet data to display.        Most Recent 3 Troponin's:  Recent Labs   Lab Test 09/09/20 2120   TROPI <0.015     Most Recent Cholesterol Panel:No lab results found.  Most Recent 6 Bacteria Isolates From Any Culture (See EPIC Reports for Culture Details):No lab results found.  Most Recent TSH, T4 and A1c Labs:No lab results found.    Results for orders placed or performed during the hospital encounter of 09/09/20   CT Head w/o Contrast    Narrative    CT SCAN OF THE HEAD WITHOUT CONTRAST   9/9/2020 9:47 PM     HISTORY: Uncontrolled blood pressure, headache.    TECHNIQUE: Axial images of the head and coronal reformations without  IV contrast material. Radiation dose for this scan was reduced using  automated exposure control, adjustment of the mA and/or kV according  to patient size, or iterative reconstruction technique.    COMPARISON: None.    FINDINGS: There is no evidence of intracranial hemorrhage, mass, acute  infarct or anomaly. The ventricles are normal in size, shape and  configuration. The brain parenchyma and subarachnoid spaces are  normal.     The visualized portions of the sinuses and mastoids appear normal. The  bony calvarium and bones of the skull base appear intact.       Impression    IMPRESSION: No evidence of acute  intracranial hemorrhage, mass, or  herniation.      RIGOBERTO ZAZUETA MD   US Renal Complete w Duplex Complete    Narrative    DUPLEX DOPPLER ULTRASOUND OF THE KIDNEYS AND BILATERAL RENAL ARTERIES   9/10/2020 7:56 AM    COMPARISON STUDY: None.    CLINICAL INFORMATION: Hypertension and acute kidney injury.    TECHNIQUE: B-mode (grayscale) and duplex Doppler evaluation of the  abdominal aorta and renal arteries performed. Velocity measurements  obtained with angle correction at or less than 60 degrees.    ORDERING PROVIDER: Sebastian Morton     FINDINGS:  The right kidney measures 8.2 cm in length. The left kidney  measures 9.4 cm in length. Cortical thickness and echogenicity is  normal. No evidence of stones, masses or hydronephrosis.    Peak systolic velocities in the abdominal aorta are:    160 cm/sec in the suprarenal aorta.    Right renal artery velocities:    Origin: 61 cm/sec   Proximal renal artery: 61 cm/sec   Mid renal artery: 75   Hilum/distal renal artery: 61 cm/sec    Resistive indices in the arcuate arteries:     Inferior pole: 0.64  Mid pole: 0.61  Superior pole: 0.69    Renal artery to aorta ratio: 0.5    Left renal artery velocities:    Origin: 95 cm/sec   Proximal renal artery: 95 cm/sec   Mid renal artery: 75   Hilum/distal renal artery: 84 cm/sec    Resistive indices in the arcuate arteries:     Inferior pole: 0.53  Mid pole: 0.69  Superior pole: 0.7    Renal artery to aorta ratio: 0.6      Impression    IMPRESSION:  1. Right kidney:    1a. Renal parenchyma: Normal cortical thickness without hydronephrosis  or mass.  1b. Renal artery: No evidence for stenosis.    2. Left kidney:    2a. Renal parenchyma: Normal cortical thickness without hydronephrosis  or mass.  2b. Renal artery: No evidence for stenosis.    TONYA RIOS

## 2020-09-12 NOTE — PROVIDER NOTIFICATION
MD Notification    Notified Person: MD    Notified Person Name: Dr. Akhtar    Notification Date/Time: 09/11/20 @ 2255    Notification Interaction: Web Paged    Purpose of Notification: Pt c/o itching    Orders Received: 25 mg benadryl q6 hour PRN

## 2020-09-12 NOTE — PROGRESS NOTES
[facility]    Medicine Progress Note - Hospitalist Service        Date of Admission:  9/9/2020  9:06 PM    Evaluated with the help of Jaber for Maltese language interpretation    Assessment & Plan:   Johnny Bingham is a 44 year old male who does not normally see a physician who was sent in to the ED from clinic on 9/9/2020 with acute renal failure and malignant hypertension.       Malignant hypertension   For the week prior to admission, the patient had issues with a headache and light headedness. This prompted him to come in to the clinic.  There he was found to have blood pressure of 249/155.  At that time they chelsey labs and started Lisinopril which he took his first dose at 1600 on the day of admission.  Labs revealed a significantly elevated creatinine and he was sent in to the ED. Upon arrival his blood pressures were still in the 200s systolic.    -Amlodipine 5 mg PO daily started on 9/10/20 with hold parameters.  If blood pressure still persistently high, will increase amlodipine to 10 mg daily tomorrow morning.  -PRN labetalol and hydralazine available.  -Blood pressure overall better controlled but still intermittently high.  -continue to monitor blood pressure, plan is to gradually lower his BP     Acute kidney injury versus acute kidney injury on chronic kidney disease  Unclear etiology at this time.  May be related to the malignant hypertension.  Found on BMP in clinic which prompted his presentation in the ED.  Cr in the ED was 5.39. Patient had been taking Aleve (up to 4 pills per day) for a headache for a few weeks prior to admission.  -Creatinine was 2.39 on 10/6/19 and 1.28 on 2/25/17.  -Renal ultrasound fairly unremarkable  -Creatinine progressively worse during this hospitalization, 6.01 today, not oliguric  -Nephrology following, await further recommendations     Headache  -Likely secondary to significantly elevated blood pressures.  -CT head at presentation negative for acute  "findings  -Headache now resolved     Anemia  -MCV within normal limits.  -Iron studies OK, ferritin slightly elevated.  -No signs of active bleeding.  -Hemoglobin stable at 9.3 this morning.    Diet: Combination Diet Regular Diet Adult     DVT Prophylaxis: Pneumatic Compression Devices   Miles Catheter: not present  Code Status: Full Code     Disposition Plan    Expected discharge: 1-2 days, pending stabilization of renal function, adequate blood pressure control and further plans from nephrology  Entered: Willian Escobedo MD 09/12/2020, 2:32 PM        The patient's care was discussed with the Bedside Nurse and Patient.    Willian Escobedo MD  Hospitalist Service  [facility]    ______________________________________________________________________    Interval History   Blood pressure overall better controlled however still having intermittent highs.  Denies headache.  Renal function slightly worse today.    Data reviewed today: I reviewed all medications, new labs and imaging results over the last 24 hours. I personally reviewed no images or EKG's today.    Physical Exam   Vital signs:  Temp: 97.3  F (36.3  C) Temp src: Oral BP: (!) 149/95 Pulse: 74   Resp: 16 SpO2: 99 % O2 Device: None (Room air)   Height: 167.6 cm (5' 6\") Weight: 52.6 kg (116 lb)  Estimated body mass index is 18.72 kg/m  as calculated from the following:    Height as of this encounter: 1.676 m (5' 6\").    Weight as of this encounter: 52.6 kg (116 lb).      Wt Readings from Last 2 Encounters:   09/12/20 52.6 kg (116 lb)   09/09/20 54.2 kg (119 lb 6.4 oz)       Gen: AAOX3, NAD  HEENT:  no pallor  Resp: CTA B/L, normal WOB  CVS: RRR, no murmur  Abd/GI: Soft, non-tender. BS- normoactive.    Skin: Warm, dry no rashes  MSK: No joint deformities, no pedal edema  Neuro- CN- intact. No focal deficits.  Spontaneously moving all 4 extremities      Data   Recent Labs   Lab 09/12/20  0647 09/11/20  0703 09/10/20  0628 09/09/20  2120   WBC  " --  7.3 8.0 8.1   HGB  --  9.3* 9.4* 10.7*   MCV  --  85 85 84    172 164 185    138 140 138   POTASSIUM 4.3 4.4 4.7 3.9   CHLORIDE 104 107 110* 105   CO2 27 27 25 28   BUN 50* 50* 52* 54*   CR 6.01* 5.84* 5.37* 5.39*   ANIONGAP 4 4 5 5   BEAU 8.5 8.2* 8.0* 8.4*   GLC 91 99 101* 167*   TROPI  --   --   --  <0.015       No results found for this or any previous visit (from the past 24 hour(s)).  Medications       amLODIPine  5 mg Oral Daily     heparin ANTICOAGULANT  5,000 Units Subcutaneous Q12H     sodium chloride (PF)  3 mL Intracatheter Q8H

## 2020-09-12 NOTE — PROGRESS NOTES
[facility] [place]     Renal Progress Note       SHORTHAND KEY FOR MY NOTES:  c = with, s = without, p = after, a = before, x = except, asx = asymptomatic, tx = transplant or treatment, sx = symptoms or symptomatic, cx = canceled or culture, rxn = reaction, yday = yesterday, nl = normal, abx = antibiotics, fxn = function, dx = diagnosis, dz = disease, m/h = melena/hematochezia, c/d/l/ha = cramping/dizziness/lightheadedness/headache, d/c = discharge or diarrhea/constipation, f/c/n/v = fevers/chills/nausea/vomiting, cp/sob = chest pain/shortness of breath, tbv = total body volume, rxn = reaction, tdc = tunneled dialysis catheter, pta = prior to admission, hd = hemodialysis, pd = peritoneal dialysis, hhd = home hemodialysis         Assessment/Plan:     1.  CKD V.  Pt's cr is now 6.  He does not have any uremic sx and his TBV is ok.  He doesn't need dialysis now, but it will definitely be part of his future, possibly in the next few mos.  This was explained to him and he voiced understanding as well as of the importance to f/u.  He will not take lisin or NSAIDs.  A.  Will need to f/u serologies.  B.  F/u c Dr. Dudley at [facility] in the next 2 wks.  C.  Check BMP next wk at primary's office at [facility].    2.  HTN.  Pt's BP has decently controlled c amlod.  A.  Increase amlod to 5 mg bid.  B.  Advised pt not to take lisin.    3.  Anemia.  Pt's anemia is due to EPO def.  Fe def is ok.  A.  Follow hb, clinically.    4.  FEN.  Electrolytes are ok.  A.  Renal diet.    5.  Dispo.  Ok to discharge today.  D/w pt the importance of f/u via .        Interval History:     Pt feels fine and has no issues.  He is urinating ok and denies any f/c/n/v.  Eating ok.  He wants to go home.            Medications and Allergies:       amLODIPine  5 mg Oral Daily     heparin ANTICOAGULANT  5,000 Units Subcutaneous Q12H     sodium chloride (PF)  3 mL Intracatheter Q8H     No Known Allergies        "Physical Exam:     Vitals were reviewed     , Blood pressure (!) 148/95, pulse 73, temperature 98.1  F (36.7  C), temperature source Oral, resp. rate 16, height 1.676 m (5' 6\"), weight 52.6 kg (116 lb), SpO2 98 %.  Wt Readings from Last 3 Encounters:   09/12/20 52.6 kg (116 lb)   09/09/20 54.2 kg (119 lb 6.4 oz)     Intake/Output Summary (Last 24 hours) at 9/12/2020 1711  Last data filed at 9/12/2020 0558  Gross per 24 hour   Intake --   Output 550 ml   Net -550 ml     GENERAL APPEARANCE: pleasant, NAD, alert  HEENT:  eyes/ears/nose/neck grossly nl  RESP: lungs CTA B c good efforts, no crackles  CV: RRR, nl S1/S2  ABDOMEN: s/nt/nd  EXTREMITIES/SKIN: no ble edema         Data:     CBC RESULTS:     Recent Labs   Lab 09/12/20  0647 09/11/20  0703 09/10/20  0628 09/09/20 2120   WBC  --  7.3 8.0 8.1   RBC  --  3.33* 3.43* 3.84*   HGB  --  9.3* 9.4* 10.7*   HCT  --  28.3* 29.0* 32.4*    172 164 185     Basic Metabolic Panel:  Recent Labs   Lab 09/12/20  0647 09/11/20  0703 09/10/20  0628 09/09/20 2120 09/09/20  1453    138 140 138 141   POTASSIUM 4.3 4.4 4.7 3.9 4.5   CHLORIDE 104 107 110* 105 107   CO2 27 27 25 28 23   BUN 50* 50* 52* 54* 56*   CR 6.01* 5.84* 5.37* 5.39* 5.49*   GLC 91 99 101* 167* 108*   BEAU 8.5 8.2* 8.0* 8.4* 8.9     INRNo lab results found in last 7 days.   Attestation:   I have reviewed today's relevant vital signs, notes, medications, labs and imaging.    Sebastian Morton MD  [facility] - Nephrology  [phone]  "

## 2020-09-12 NOTE — PLAN OF CARE
VSS on RA except HTN. A &Ox4, Belgian speaking, jabber used. Denies pain. Independent in room. PIV SL. Discharge tomorrow pending Cr remaining the same or decreasing

## 2020-09-12 NOTE — PLAN OF CARE
Patient is A&Ox4, Icelandic speaking. VSS ex hypertensive. Denies pain and headache. Denies nausea. R PIV SL. Up independently to bathroom. C/o itching, gave PO benadryl. Discharge 9/12 pending creatinine levels. Calls appropriately.

## 2020-09-13 VITALS
OXYGEN SATURATION: 100 % | DIASTOLIC BLOOD PRESSURE: 104 MMHG | HEIGHT: 66 IN | WEIGHT: 116 LBS | SYSTOLIC BLOOD PRESSURE: 168 MMHG | TEMPERATURE: 97.4 F | HEART RATE: 76 BPM | BODY MASS INDEX: 18.64 KG/M2 | RESPIRATION RATE: 14 BRPM

## 2020-09-13 LAB
ANION GAP SERPL CALCULATED.3IONS-SCNC: 7 MMOL/L (ref 3–14)
BUN SERPL-MCNC: 58 MG/DL (ref 7–30)
CALCIUM SERPL-MCNC: 8.7 MG/DL (ref 8.5–10.1)
CHLORIDE SERPL-SCNC: 105 MMOL/L (ref 94–109)
CO2 SERPL-SCNC: 25 MMOL/L (ref 20–32)
CREAT SERPL-MCNC: 5.64 MG/DL (ref 0.66–1.25)
GFR SERPL CREATININE-BSD FRML MDRD: 11 ML/MIN/{1.73_M2}
GLUCOSE SERPL-MCNC: 85 MG/DL (ref 70–99)
HBV SURFACE AB SERPL IA-ACNC: 3.79 M[IU]/ML
HBV SURFACE AG SERPL QL IA: NONREACTIVE
HCV AB SERPL QL IA: NONREACTIVE
HGB BLD-MCNC: 10.4 G/DL (ref 13.3–17.7)
POTASSIUM SERPL-SCNC: 4.3 MMOL/L (ref 3.4–5.3)
SODIUM SERPL-SCNC: 137 MMOL/L (ref 133–144)

## 2020-09-13 PROCEDURE — 80048 BASIC METABOLIC PNL TOTAL CA: CPT | Performed by: INTERNAL MEDICINE

## 2020-09-13 PROCEDURE — 36415 COLL VENOUS BLD VENIPUNCTURE: CPT | Performed by: INTERNAL MEDICINE

## 2020-09-13 PROCEDURE — 85018 HEMOGLOBIN: CPT | Performed by: INTERNAL MEDICINE

## 2020-09-13 PROCEDURE — 99239 HOSP IP/OBS DSCHRG MGMT >30: CPT | Performed by: INTERNAL MEDICINE

## 2020-09-13 PROCEDURE — 25000128 H RX IP 250 OP 636: Performed by: INTERNAL MEDICINE

## 2020-09-13 PROCEDURE — 25000132 ZZH RX MED GY IP 250 OP 250 PS 637: Performed by: INTERNAL MEDICINE

## 2020-09-13 RX ORDER — CARVEDILOL 3.12 MG/1
3.12 TABLET ORAL 2 TIMES DAILY WITH MEALS
Status: DISCONTINUED | OUTPATIENT
Start: 2020-09-13 | End: 2020-09-13 | Stop reason: HOSPADM

## 2020-09-13 RX ORDER — CARVEDILOL 3.12 MG/1
3.12 TABLET ORAL 2 TIMES DAILY WITH MEALS
Qty: 60 TABLET | Refills: 1 | Status: SHIPPED | OUTPATIENT
Start: 2020-09-13 | End: 2020-09-29

## 2020-09-13 RX ADMIN — HEPARIN SODIUM 5000 UNITS: 5000 INJECTION, SOLUTION INTRAVENOUS; SUBCUTANEOUS at 00:14

## 2020-09-13 RX ADMIN — AMLODIPINE BESYLATE 5 MG: 5 TABLET ORAL at 07:26

## 2020-09-13 RX ADMIN — CARVEDILOL 3.12 MG: 3.12 TABLET, FILM COATED ORAL at 09:23

## 2020-09-13 ASSESSMENT — ACTIVITIES OF DAILY LIVING (ADL)
ADLS_ACUITY_SCORE: 10

## 2020-09-13 NOTE — PLAN OF CARE
VSS on RA ex HTN. A &O x4, complained of headache @HS rated at 4 but declined interventions. Regular diet. Independent in room. Nauruan speaking, speaks very little English, jabber at bedside. Hospitalist to see this am, discharge to home pending.

## 2020-09-13 NOTE — PLAN OF CARE
Shift Note: Persistent HTN, increase in meds and additional dose, and still very elevated. Discharge held until tomorrow, Hospitalist to see early. Other VSS, no pain, indep, VERY little english, use Jabber at bedside.

## 2020-09-13 NOTE — PROGRESS NOTES
RN had discussion through Jacquelyn with patient about discharge and his persistent HTN. Despite additional dose of amlodipine, his pressure went up. Explained to patient the serious concern for risk of cardiac event (MI) and/or stroke. Patient initially expressed that he planned to discharge as planned and had follow up scheduled Wednesday. Dr Escobedo called to check on blood pressure, and asked patient to consider staying, promising to see him first in AM. Patient agreed to stay, declined replacement of IV and TELE. RN apologized, recognized his disappointment, asked if there was anything to make him more comfortable. Will monitor overnight and hope to discharge early in AM.

## 2020-09-13 NOTE — PROGRESS NOTES
Writer did not get to meet with the patient. Patient has orders for discharge which include PCP follow up and specialty follow up with [facility] Dr. Dudley.    Writer called Dr. Dudley's clinic ([place]) [phone] and they are unable to schedule specialty appointments for this provider.  Writer updated AVS to have patient/family call for an appointment on Monday 9/14  Patient has a PCP follow up for 9/16/2020 which he should keep as scheduled.    Writer updated bedside on the above for discharge planning.  No further needs identified.

## 2020-09-14 LAB
ALBUMIN MFR UR ELPH: 73.1 %
ALBUMIN SERPL ELPH-MCNC: 4 G/DL (ref 3.7–5.1)
ALPHA1 GLOB MFR UR ELPH: 4 %
ALPHA1 GLOB SERPL ELPH-MCNC: 0.3 G/DL (ref 0.2–0.4)
ALPHA2 GLOB MFR UR ELPH: 4 %
ALPHA2 GLOB SERPL ELPH-MCNC: 0.6 G/DL (ref 0.5–0.9)
ANA SER QL IF: NEGATIVE
ANCA AB PATTERN SER IF-IMP: NORMAL
ASO AB SERPL-ACNC: NORMAL IU/ML (ref 0–120)
B-GLOBULIN MFR UR ELPH: 10.9 %
B-GLOBULIN SERPL ELPH-MCNC: 0.9 G/DL (ref 0.6–1)
C-ANCA TITR SER IF: NORMAL {TITER}
C3 SERPL-MCNC: 94 MG/DL (ref 81–157)
C4 SERPL-MCNC: 32 MG/DL (ref 13–39)
GAMMA GLOB MFR UR ELPH: 8 %
GAMMA GLOB SERPL ELPH-MCNC: 1.1 G/DL (ref 0.7–1.6)
M PROTEIN MFR UR ELPH: 0 %
M PROTEIN SERPL ELPH-MCNC: 0 G/DL
PROT PATTERN SERPL ELPH-IMP: NORMAL
PROT PATTERN UR ELPH-IMP: ABNORMAL

## 2020-09-15 LAB
DSDNA AB SER-ACNC: <1 IU/ML
RESULT: NORMAL
SEND OUTS MISC TEST CODE: NORMAL
SEND OUTS MISC TEST SPECIMEN: NORMAL
TEST NAME: NORMAL

## 2020-09-16 ENCOUNTER — OFFICE VISIT (OUTPATIENT)
Dept: FAMILY MEDICINE | Facility: CLINIC | Age: 45
End: 2020-09-16

## 2020-09-16 ENCOUNTER — TELEPHONE (OUTPATIENT)
Dept: FAMILY MEDICINE | Facility: CLINIC | Age: 45
End: 2020-09-16

## 2020-09-16 VITALS
WEIGHT: 117 LBS | OXYGEN SATURATION: 99 % | RESPIRATION RATE: 12 BRPM | SYSTOLIC BLOOD PRESSURE: 160 MMHG | DIASTOLIC BLOOD PRESSURE: 100 MMHG | TEMPERATURE: 97.9 F | HEART RATE: 77 BPM | HEIGHT: 66 IN | BODY MASS INDEX: 18.8 KG/M2

## 2020-09-16 DIAGNOSIS — N17.9 ACUTE RENAL FAILURE, UNSPECIFIED ACUTE RENAL FAILURE TYPE (H): Primary | ICD-10-CM

## 2020-09-16 DIAGNOSIS — I10 HYPERTENSION GOAL BP (BLOOD PRESSURE) < 140/90: ICD-10-CM

## 2020-09-16 DIAGNOSIS — Z13.220 SCREENING FOR HYPERLIPIDEMIA: ICD-10-CM

## 2020-09-16 LAB
ALBUMIN SERPL-MCNC: 3.6 G/DL (ref 3.4–5)
ALP SERPL-CCNC: 46 U/L (ref 40–150)
ALT SERPL W P-5'-P-CCNC: 49 U/L (ref 0–70)
ANION GAP SERPL CALCULATED.3IONS-SCNC: 8 MMOL/L (ref 3–14)
AST SERPL W P-5'-P-CCNC: 26 U/L (ref 0–45)
BASOPHILS # BLD AUTO: 0 10E9/L (ref 0–0.2)
BASOPHILS NFR BLD AUTO: 0.8 %
BILIRUB SERPL-MCNC: 0.2 MG/DL (ref 0.2–1.3)
BUN SERPL-MCNC: 67 MG/DL (ref 7–30)
CALCIUM SERPL-MCNC: 8.3 MG/DL (ref 8.5–10.1)
CHLORIDE SERPL-SCNC: 104 MMOL/L (ref 94–109)
CHOLEST SERPL-MCNC: 237 MG/DL
CO2 SERPL-SCNC: 26 MMOL/L (ref 20–32)
CREAT SERPL-MCNC: 5.47 MG/DL (ref 0.66–1.25)
DIFFERENTIAL METHOD BLD: ABNORMAL
EOSINOPHIL # BLD AUTO: 0.2 10E9/L (ref 0–0.7)
EOSINOPHIL NFR BLD AUTO: 2.9 %
ERYTHROCYTE [DISTWIDTH] IN BLOOD BY AUTOMATED COUNT: 11.9 % (ref 10–15)
GFR SERPL CREATININE-BSD FRML MDRD: 11 ML/MIN/{1.73_M2}
GLUCOSE SERPL-MCNC: 96 MG/DL (ref 70–99)
HCT VFR BLD AUTO: 28.3 % (ref 40–53)
HDLC SERPL-MCNC: 36 MG/DL
HGB BLD-MCNC: 9.2 G/DL (ref 13.3–17.7)
LDLC SERPL CALC-MCNC: ABNORMAL MG/DL
LDLC SERPL DIRECT ASSAY-MCNC: 138 MG/DL
LYMPHOCYTES # BLD AUTO: 1.9 10E9/L (ref 0.8–5.3)
LYMPHOCYTES NFR BLD AUTO: 35.8 %
MCH RBC QN AUTO: 28 PG (ref 26.5–33)
MCHC RBC AUTO-ENTMCNC: 32.5 G/DL (ref 31.5–36.5)
MCV RBC AUTO: 86 FL (ref 78–100)
MONOCYTES # BLD AUTO: 0.4 10E9/L (ref 0–1.3)
MONOCYTES NFR BLD AUTO: 7.5 %
NEUTROPHILS # BLD AUTO: 2.8 10E9/L (ref 1.6–8.3)
NEUTROPHILS NFR BLD AUTO: 53 %
NONHDLC SERPL-MCNC: 201 MG/DL
PLATELET # BLD AUTO: 179 10E9/L (ref 150–450)
POTASSIUM SERPL-SCNC: 4.1 MMOL/L (ref 3.4–5.3)
PROT SERPL-MCNC: 7.6 G/DL (ref 6.8–8.8)
RBC # BLD AUTO: 3.29 10E12/L (ref 4.4–5.9)
SODIUM SERPL-SCNC: 138 MMOL/L (ref 133–144)
TRIGL SERPL-MCNC: 463 MG/DL
WBC # BLD AUTO: 5.2 10E9/L (ref 4–11)

## 2020-09-16 PROCEDURE — 83721 ASSAY OF BLOOD LIPOPROTEIN: CPT | Performed by: PHYSICIAN ASSISTANT

## 2020-09-16 PROCEDURE — 36415 COLL VENOUS BLD VENIPUNCTURE: CPT | Performed by: PHYSICIAN ASSISTANT

## 2020-09-16 PROCEDURE — 80053 COMPREHEN METABOLIC PANEL: CPT | Performed by: PHYSICIAN ASSISTANT

## 2020-09-16 PROCEDURE — 85025 COMPLETE CBC W/AUTO DIFF WBC: CPT | Performed by: PHYSICIAN ASSISTANT

## 2020-09-16 PROCEDURE — 99214 OFFICE O/P EST MOD 30 MIN: CPT | Performed by: PHYSICIAN ASSISTANT

## 2020-09-16 PROCEDURE — 80061 LIPID PANEL: CPT | Performed by: PHYSICIAN ASSISTANT

## 2020-09-16 RX ORDER — CARVEDILOL 6.25 MG/1
6.25 TABLET ORAL 2 TIMES DAILY WITH MEALS
Qty: 60 TABLET | Refills: 0 | Status: CANCELLED | OUTPATIENT
Start: 2020-09-16 | End: 2020-10-16

## 2020-09-16 ASSESSMENT — PAIN SCALES - GENERAL: PAINLEVEL: NO PAIN (0)

## 2020-09-16 ASSESSMENT — MIFFLIN-ST. JEOR: SCORE: 1353.46

## 2020-09-16 NOTE — PATIENT INSTRUCTIONS
Increase corvedilol coreg- take 2 tablets twice a day   Continue amlodipine as you have been taking 5 mg twice a day   Schedule follow up with nephrologist at [facility] ([phone]) formerly called [facility] as soon as possible.     At [facility], we strive to deliver an exceptional experience to you, every time we see you. If you receive a survey, please complete it as we do value your feedback.  If you have MyChart, you can expect to receive results automatically within 24 hours of their completion.  Your provider will send a note interpreting your results as well.   If you do not have MyChart, you should receive your results in about a week by mail.    Your care team:                            Family Medicine Internal Medicine   MD James Davis, MD Wesley Hogan MD Katya Georgiev PA-C  Komal Montiel, APRN CNP    David Hernandez, MD Pediatrics   Evan Mendenhall, PA-C  Echo Retana, CNP Asia Huston, MD Bianca Hunt APRN CNP   MD Meliza Adams MD Deborah Mielke, MD Octavia Fonseca, APRN CNP  Maddi Sims, PA-C  Zakia Lai, CNP  MD Maday Almaraz MD Angela Wermerskirchen, MD      Clinic hours: Monday - Thursday 7 am-7 pm; Fridays 7 am-5 pm.   Urgent care: Monday - Friday 11 am-9 pm; Saturday and Sunday 9 am-5 pm.    Clinic: [phone]       [facility]: Monday - Thursday 8 am - 7 pm; Friday 8 am - 6 pm  [facility]: [phone]     Use [email or web address] for 24/7 diagnosis and treatment of dozens of conditions.

## 2020-09-16 NOTE — PROGRESS NOTES
"Subjective     Johnny Bingham is a 44 year old male who presents to clinic today for the following health issues:    HPI       Hypertension Follow-up      Do you check your blood pressure regularly outside of the clinic? Yes     Are you following a low salt diet? Yes    Are your blood pressures ever more than 140 on the top number (systolic) OR more   than 90 on the bottom number (diastolic), for example 140/90? yes      How many servings of fruits and vegetables do you eat daily?  2-3    On average, how many sweetened beverages do you drink each day (Examples: soda, juice, sweet tea, etc.  Do NOT count diet or artificially sweetened beverages)?   0    How many days per week do you exercise enough to make your heart beat faster? 3 or less    How many minutes a day do you exercise enough to make your heart beat faster? 9 or less    How many days per week do you miss taking your medication? 0    Using phone .  Recent hospitalization for acute kidney failure and hypertensive urgency   Reports that he is Getting blood pressure 117/83-84 127/92-93  Didn't bring in blood pressure cuff with him to compare readings   No chest pain or shortness of breath   No peripheral edema  Works at [employer] - quit working since covid started   Has not yet scheduled follow up with nephrology     Review of Systems   Constitutional, HEENT, cardiovascular, pulmonary, gi and gu systems are negative, except as otherwise noted.      Objective    BP (!) 160/100   Pulse 77   Temp 97.9  F (36.6  C) (Oral)   Resp 12   Ht 1.676 m (5' 6\")   Wt 53.1 kg (117 lb)   SpO2 99%   BMI 18.88 kg/m    Body mass index is 18.88 kg/m .  Physical Exam   GENERAL: healthy, alert and no distress  NECK: no adenopathy, no asymmetry, masses, or scars and thyroid normal to palpation  RESP: lungs clear to auscultation - no rales, rhonchi or wheezes  CV: regular rate and rhythm, normal S1 S2, no S3 or S4, no murmur, click or rub, no peripheral edema and " peripheral pulses strong  ABDOMEN: soft, nontender, no hepatosplenomegaly, no masses and bowel sounds normal  MS: no gross musculoskeletal defects noted, no edema    Results for orders placed or performed in visit on 09/16/20   CBC with platelets and differential     Status: Abnormal   Result Value Ref Range    WBC 5.2 4.0 - 11.0 10e9/L    RBC Count 3.29 (L) 4.4 - 5.9 10e12/L    Hemoglobin 9.2 (L) 13.3 - 17.7 g/dL    Hematocrit 28.3 (L) 40.0 - 53.0 %    MCV 86 78 - 100 fl    MCH 28.0 26.5 - 33.0 pg    MCHC 32.5 31.5 - 36.5 g/dL    RDW 11.9 10.0 - 15.0 %    Platelet Count 179 150 - 450 10e9/L    % Neutrophils 53.0 %    % Lymphocytes 35.8 %    % Monocytes 7.5 %    % Eosinophils 2.9 %    % Basophils 0.8 %    Absolute Neutrophil 2.8 1.6 - 8.3 10e9/L    Absolute Lymphocytes 1.9 0.8 - 5.3 10e9/L    Absolute Monocytes 0.4 0.0 - 1.3 10e9/L    Absolute Eosinophils 0.2 0.0 - 0.7 10e9/L    Absolute Basophils 0.0 0.0 - 0.2 10e9/L    Diff Method Automated Method    Comprehensive metabolic panel (BMP + Alb, Alk Phos, ALT, AST, Total. Bili, TP)     Status: Abnormal   Result Value Ref Range    Sodium 138 133 - 144 mmol/L    Potassium 4.1 3.4 - 5.3 mmol/L    Chloride 104 94 - 109 mmol/L    Carbon Dioxide 26 20 - 32 mmol/L    Anion Gap 8 3 - 14 mmol/L    Glucose 96 70 - 99 mg/dL    Urea Nitrogen 67 (H) 7 - 30 mg/dL    Creatinine 5.47 (H) 0.66 - 1.25 mg/dL    GFR Estimate 11 (L) >60 mL/min/[1.73_m2]    GFR Estimate If Black 13 (L) >60 mL/min/[1.73_m2]    Calcium 8.3 (L) 8.5 - 10.1 mg/dL    Bilirubin Total 0.2 0.2 - 1.3 mg/dL    Albumin 3.6 3.4 - 5.0 g/dL    Protein Total 7.6 6.8 - 8.8 g/dL    Alkaline Phosphatase 46 40 - 150 U/L    ALT 49 0 - 70 U/L    AST 26 0 - 45 U/L   Lipid panel reflex to direct LDL Non-fasting     Status: Abnormal   Result Value Ref Range    Cholesterol 237 (H) <200 mg/dL    Triglycerides 463 (H) <150 mg/dL    HDL Cholesterol 36 (L) >39 mg/dL    LDL Cholesterol Calculated  <100 mg/dL     Cannot estimate LDL when  triglyceride exceeds 400 mg/dL    Non HDL Cholesterol 201 (H) <130 mg/dL   LDL cholesterol direct     Status: Abnormal   Result Value Ref Range    LDL Cholesterol Direct 138 (H) <100 mg/dL           Assessment & Plan     Acute renal failure, unspecified acute renal failure type (H)  Labs not improving- at time of visit had not yet scheduled with nephrology   - CBC with platelets and differential  - NEPHROLOGY ADULT REFERRAL  - Comprehensive metabolic panel (BMP + Alb, Alk Phos, ALT, AST, Total. Bili, TP)  - LDL cholesterol direct    Hypertension goal BP (blood pressure) < 140/90  Blood pressure not at goal here- increased coreg from 3.125 mg twice a day to 2 tablets twice a day (has plenty of prescription) to double up dose   - CBC with platelets and differential  - NEPHROLOGY ADULT REFERRAL  - Comprehensive metabolic panel (BMP + Alb, Alk Phos, ALT, AST, Total. Bili, TP)    Screening for hyperlipidemia  See letter advising to follow up with PCP due to high cholesterol   - Lipid panel reflex to direct LDL Non-fasting         Patient Instructions     Increase corvedilol coreg- take 2 tablets twice a day   Continue amlodipine as you have been taking 5 mg twice a day   Schedule follow up with nephrologist at [facility] ([phone]) formerly called [facility] as soon as possible.     At [facility], we strive to deliver an exceptional experience to you, every time we see you. If you receive a survey, please complete it as we do value your feedback.  If you have [employer], you can expect to receive results automatically within 24 hours of their completion.  Your provider will send a note interpreting your results as well.   If you do not have MyChart, you should receive your results in about a week by mail.    Your care team:                            Family Medicine Internal Medicine   MD James Davis MD Shantel Branch-Fleming, MD                                   MD Jade Moore PA-C, APRN CNP    David Hernandez, MD Pediatrics   Evan Mendenhall, PASHANNON Retana, CNP MD Bianca Giordano APRN CNP   MD Meliza Adams MD Deborah Mielke, MD Kim Thein, APRN CNP  Maddi Sims, HENRY Lai, CNP  MD Maday Almaraz MD Angela Wermerskirchen, MD      Clinic hours: Monday - Thursday 7 am-7 pm; Fridays 7 am-5 pm.   Urgent care: Monday - Friday 11 am-9 pm; Saturday and Sunday 9 am-5 pm.    Clinic: [phone]       [facility]: Monday - Thursday 8 am - 7 pm; Friday 8 am - 6 pm  [facility]: [phone]     Use www.[email or web address] for 24/7 diagnosis and treatment of dozens of conditions.        Return in about 2 weeks (around 9/30/2020), or if symptoms worsen or fail to improve, for BP Recheck, In-Clinic Visit.    Maddi Nation, HENRY  [facility]

## 2020-09-16 NOTE — LETTER
September 20, 2020      Johnny Bingham  [address]  [place] [address]        Dear Johnny,     You are quite anemic likely from your kidney disease.   Your kidney function is not improving.  I see that you have an appointment with the kidney specialist. Make sure you keep that appointment.   Your cholesterol is quite high.  Please schedule an appointment with the primary care provider of your choice to review your cholesterol and treatment to reduce your risk of heart disease and/or stroke.   Please call or MyChart my office with any questions or concerns.            Sincerely,        Maddi Nation PA-C

## 2020-09-17 ENCOUNTER — APPOINTMENT (OUTPATIENT)
Dept: INTERPRETER SERVICES | Facility: CLINIC | Age: 45
End: 2020-09-17

## 2020-09-17 NOTE — RESULT ENCOUNTER NOTE
Please call and advise- will need .  Kidney function is not improving. Electrolytes are normal,  I see has not scheduled with nephrologist yet- it is emergent that he schedule follow up with nephrology at [facility] ([phone]) formerly called [facility].

## 2020-09-17 NOTE — TELEPHONE ENCOUNTER
Received call from lab as on-call provider.  Cr >5, but stable from previous.  Please schedule appt with PCP.      Teddy Zepeda MD, FAAFP  Family Medicine Physician  [facility] [place]  [address] [place] [address]

## 2020-09-21 ENCOUNTER — DOCUMENTATION ONLY (OUTPATIENT)
Dept: LAB | Facility: CLINIC | Age: 45
End: 2020-09-21

## 2020-09-21 DIAGNOSIS — N17.9 ACUTE RENAL FAILURE, UNSPECIFIED ACUTE RENAL FAILURE TYPE (H): Primary | ICD-10-CM

## 2020-09-21 NOTE — PROGRESS NOTES
Pt has a lab appointment on 09.22.2020, please review their chart and add orders if necessary.    Thank you,    ROE Alfaro     
RN Please contact nephrologist RN and find out what labs nephrologist would like prior to appointment with Dr. Dudley   I had sent direct message to Dr. Dudley but not heard back   
Ambulatory

## 2020-09-22 DIAGNOSIS — N25.81 SECONDARY RENAL HYPERPARATHYROIDISM (H): Primary | ICD-10-CM

## 2020-09-23 ENCOUNTER — APPOINTMENT (OUTPATIENT)
Dept: INTERPRETER SERVICES | Facility: CLINIC | Age: 45
End: 2020-09-23

## 2020-09-24 DIAGNOSIS — N25.81 SECONDARY RENAL HYPERPARATHYROIDISM (H): ICD-10-CM

## 2020-09-24 DIAGNOSIS — N17.9 ACUTE RENAL FAILURE, UNSPECIFIED ACUTE RENAL FAILURE TYPE (H): ICD-10-CM

## 2020-09-24 LAB
ALBUMIN SERPL-MCNC: 3.5 G/DL (ref 3.4–5)
ANION GAP SERPL CALCULATED.3IONS-SCNC: 6 MMOL/L (ref 3–14)
BUN SERPL-MCNC: 75 MG/DL (ref 7–30)
CALCIUM SERPL-MCNC: 8.5 MG/DL (ref 8.5–10.1)
CHLORIDE SERPL-SCNC: 108 MMOL/L (ref 94–109)
CO2 SERPL-SCNC: 27 MMOL/L (ref 20–32)
CREAT SERPL-MCNC: 5.58 MG/DL (ref 0.66–1.25)
CREAT UR-MCNC: 90 MG/DL
GFR SERPL CREATININE-BSD FRML MDRD: 11 ML/MIN/{1.73_M2}
GLUCOSE SERPL-MCNC: 89 MG/DL (ref 70–99)
HGB BLD-MCNC: 9.5 G/DL (ref 13.3–17.7)
PHOSPHATE SERPL-MCNC: 4.1 MG/DL (ref 2.5–4.5)
POTASSIUM SERPL-SCNC: 4.4 MMOL/L (ref 3.4–5.3)
PROT UR-MCNC: 1.59 G/L
PROT/CREAT 24H UR: 1.76 G/G CR (ref 0–0.2)
PTH-INTACT SERPL-MCNC: 84 PG/ML (ref 18–80)
SODIUM SERPL-SCNC: 141 MMOL/L (ref 133–144)

## 2020-09-24 PROCEDURE — 85018 HEMOGLOBIN: CPT | Performed by: INTERNAL MEDICINE

## 2020-09-24 PROCEDURE — 83970 ASSAY OF PARATHORMONE: CPT | Performed by: INTERNAL MEDICINE

## 2020-09-24 PROCEDURE — 84156 ASSAY OF PROTEIN URINE: CPT | Performed by: INTERNAL MEDICINE

## 2020-09-24 PROCEDURE — 36415 COLL VENOUS BLD VENIPUNCTURE: CPT | Performed by: INTERNAL MEDICINE

## 2020-09-24 PROCEDURE — 82306 VITAMIN D 25 HYDROXY: CPT | Performed by: INTERNAL MEDICINE

## 2020-09-24 PROCEDURE — 80069 RENAL FUNCTION PANEL: CPT | Performed by: INTERNAL MEDICINE

## 2020-09-29 ENCOUNTER — VIRTUAL VISIT (OUTPATIENT)
Dept: NEPHROLOGY | Facility: CLINIC | Age: 45
End: 2020-09-29

## 2020-09-29 DIAGNOSIS — N25.81 SECONDARY RENAL HYPERPARATHYROIDISM (H): ICD-10-CM

## 2020-09-29 DIAGNOSIS — I16.0 HYPERTENSIVE URGENCY: ICD-10-CM

## 2020-09-29 DIAGNOSIS — N18.5 CKD (CHRONIC KIDNEY DISEASE) STAGE 5, GFR LESS THAN 15 ML/MIN (H): Primary | ICD-10-CM

## 2020-09-29 DIAGNOSIS — N18.5 ANEMIA DUE TO STAGE 5 CHRONIC KIDNEY DISEASE, NOT ON CHRONIC DIALYSIS (H): ICD-10-CM

## 2020-09-29 DIAGNOSIS — D63.1 ANEMIA DUE TO STAGE 5 CHRONIC KIDNEY DISEASE, NOT ON CHRONIC DIALYSIS (H): ICD-10-CM

## 2020-09-29 LAB
DEPRECATED CALCIDIOL+CALCIFEROL SERPL-MC: <40 UG/L (ref 20–75)
VITAMIN D2 SERPL-MCNC: <5 UG/L
VITAMIN D3 SERPL-MCNC: 35 UG/L

## 2020-09-29 PROCEDURE — 99244 OFF/OP CNSLTJ NEW/EST MOD 40: CPT | Mod: 95 | Performed by: INTERNAL MEDICINE

## 2020-09-29 RX ORDER — CARVEDILOL 12.5 MG/1
12.5 TABLET ORAL 2 TIMES DAILY WITH MEALS
Qty: 180 TABLET | Refills: 3 | Status: ON HOLD | OUTPATIENT
Start: 2020-09-29 | End: 2021-05-09

## 2020-09-29 NOTE — PATIENT INSTRUCTIONS
1. INcrease carvedilol to 12.5 mg twice a day. I have sent this new prescription to the pharmacy.     2. Stay on the amlodipine 5 mg twice a day    3. In person visit in the next month - 10/19 for a 60 min appt please    4. We will be in touch regarding your blood pressure readings in a few weeks to assure stable.

## 2020-09-29 NOTE — PROGRESS NOTES
September 29, 2020    I was asked to see this patient by Dr. Dorsey for evaluation of advanced kidney disease    CC: advanced kidney disease    HPI: Johnny Bingham is a 44 year old male who presents for evaluation of advanced kidney disease. Mr. Bingham was admitted to [facility] 9/9-9/13 for hypertensive emergency - presented with a BP of 249/155. He was noted to have a creatinine of 6. Mr. Bingham reports that he had a headache for 3-4 months leading up to the hospitalization so feels his pressure may have been that high for much time. He was not followed by a physician leading up to this hospitalization. For his headaches he was taking 4 advil tabs/day leading up to the hospitalization and did so for ~ 4 months. Since his blood pressure has been treated, his headaches has also treated. He is currently taking norvasc 5 mg BID and coreg 6.25 mg BID.    He reports that he is eating and drinking well; energy is ok. No NSAID use, he does drink caffeine in the AM. Previously used tobacco product but no longer. No drugs/no etoh. No difficulty with urination.     - History of Hematuria: no  - Swelling: no  - Hx of UTIs: no  - Hx of stones: no  - Rashes/Joint pain: no  - Family hx of kidney disease: father with kidney disease of unclear etiology  - NSAID use: up to hspitalization but not now.      No Known Allergies    amLODIPine (NORVASC) 5 MG tablet, Take 1 tablet (5 mg) by mouth 2 times daily  acetaminophen (TYLENOL) 325 MG tablet, Take 325-650 mg by mouth every 6 hours as needed for mild pain    No current facility-administered medications on file prior to visit.       Past Medical History:   Diagnosis Date     CKD (chronic kidney disease) stage 5, GFR less than 15 ml/min (H)      Hypertension        Past Surgical History:   Procedure Laterality Date     NO HISTORY OF SURGERY         Social History     Tobacco Use     Smoking status: Former Smoker     Smokeless tobacco: Never Used     Tobacco comment: quit 6 months  ago -1/2020    Substance Use Topics     Alcohol use: Never     Frequency: Never     Drug use: Never       Family History   Problem Relation Age of Onset     Heart Disease Mother      Kidney Disease Father      Heart Disease Father      Diabetes Brother      Colon Cancer No family hx of      Prostate Cancer No family hx of        ROS: A 12 system review of systems was negative other than noted here or above.     Exam:  There were no vitals taken for this visit.    GENERAL: Healthy, alert and no distress  EYES: Eyes grossly normal to inspection.  No discharge or erythema, or obvious scleral/conjunctival abnormalities.  RESP: No audible wheeze, cough, or visible cyanosis.  No visible retractions or increased work of breathing.    NEURO: Cranial nerves grossly intact.  Mentation and speech appropriate for age.  PSYCH: Mentation appears normal, affect normal/bright, judgement and insight intact, normal speech and appearance well-groomed.     Results:    No visits with results within 1 Day(s) from this visit.   Latest known visit with results is:   Orders Only on 09/24/2020   Component Date Value Ref Range Status     25 OH Vit D2 09/24/2020 <5  ug/L Final     25 OH Vit D3 09/24/2020 35  ug/L Final     25 OH Vit D total 09/24/2020 <40  20 - 75 ug/L Final    Comment: Season, race, dietary intake, and treatment affect the concentration of   25-hydroxy-Vitamin D. Values may decrease during winter months and increase   during summer months. Values 20-29 ug/L may indicate Vitamin D insufficiency   and values <20 ug/L may indicate Vitamin D deficiency.  This test was developed and its performance characteristics determined by the   [facility].   It has not been cleared or approved by the FDA. The laboratory is regulated   under CLIA as qualified to perform high-complexity testing. This test is used   for clinical purposes. It should not be regarded as  investigational or for   research.       Protein Random Urine 09/24/2020 1.59  g/L Final     Protein Total Urine g/gr Creatinine 09/24/2020 1.76* 0 - 0.2 g/g Cr Final     Hemoglobin 09/24/2020 9.5* 13.3 - 17.7 g/dL Final     Parathyroid Hormone Intact 09/24/2020 84* 18 - 80 pg/mL Final     Sodium 09/24/2020 141  133 - 144 mmol/L Final     Potassium 09/24/2020 4.4  3.4 - 5.3 mmol/L Final     Chloride 09/24/2020 108  94 - 109 mmol/L Final     Carbon Dioxide 09/24/2020 27  20 - 32 mmol/L Final     Anion Gap 09/24/2020 6  3 - 14 mmol/L Final     Glucose 09/24/2020 89  70 - 99 mg/dL Final    Non Fasting     Urea Nitrogen 09/24/2020 75* 7 - 30 mg/dL Final     Creatinine 09/24/2020 5.58* 0.66 - 1.25 mg/dL Final    Comment: Critical Value called to and read back by  DR LARA AT 1654, 09/24/20 1170  Results confirmed by repeat test       GFR Estimate 09/24/2020 11* >60 mL/min/[1.73_m2] Final    Comment: Non  GFR Calc  Starting 12/18/2018, serum creatinine based estimated GFR (eGFR) will be   calculated using the Chronic Kidney Disease Epidemiology Collaboration   (CKD-EPI) equation.       GFR Estimate If Black 09/24/2020 13* >60 mL/min/[1.73_m2] Final    Comment:  GFR Calc  Starting 12/18/2018, serum creatinine based estimated GFR (eGFR) will be   calculated using the Chronic Kidney Disease Epidemiology Collaboration   (CKD-EPI) equation.       Calcium 09/24/2020 8.5  8.5 - 10.1 mg/dL Final     Phosphorus 09/24/2020 4.1  2.5 - 4.5 mg/dL Final     Albumin 09/24/2020 3.5  3.4 - 5.0 g/dL Final     Creatinine Urine 09/24/2020 90  mg/dL Final       Assessment/Plan:   1. 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 UA showed RBCs and UPCR was 1.7 g/g. Serologic workup negative including ANCA, complements, ASO, Myeloma, hepatitis, WAN. Ultrasound was without obstruction. Potential of dialysis was discussed while hospitalized and we spent time discussing the risk of kidney failure again  "today. I plan to have him for an in-person visit in the coming weeks to assure he has clear RRT plans in place including education regarding transplant as well as dailysis. Educated to avoid NSAIDs.     2. Hypertension: blood pressure has been 150-160 most recently - heart rate is still in the 70s. He is taking coreg 6.25 mg BID and norvasc 5 mg BID. We will increase the coreg to 12.5 mg BID at this time.     3. Anemia: hemoglobin 9.5 - will get iron studies with next lab test.     4. Secondary hyperparathyroidis, renal: PTH 84 - vitamin d 40    Patient Instructions   1. INcrease carvedilol to 12.5 mg twice a day. I have sent this new prescription to the pharmacy.     2. Stay on the amlodipine 5 mg twice a day    3. In person visit in the next month - 10/19 for a 60 min appt please    4. We will be in touch regarding your blood pressure readings in a few weeks to assure stable.          Micheline Dudley DO   .    Johnny Bingham is a 44 year old male who is being evaluated via a billable video visit.      The patient has been notified of following:     \"This video visit will be conducted via a call between you and your physician/provider. We have found that certain health care needs can be provided without the need for an in-person physical exam.  This service lets us provide the care you need with a video conversation.  If a prescription is necessary we can send it directly to your pharmacy.  If lab work is needed we can place an order for that and you can then stop by our lab to have the test done at a later time.    Video visits are billed at different rates depending on your insurance coverage.  Please reach out to your insurance provider with any questions.    If during the course of the call the physician/provider feels a video visit is not appropriate, you will not be charged for this service.\"      Video Visit - please text invite to: [phone]  Patient has given verbal consent for Video visit? Yes  How " would you like to obtain your AVS? Mail  If you are dropped from the video visit, the video invite should be resent to:[phone]  Will anyone else be joining your video visit? No        Tyler Jimenez LPN    Rheumatology / Pulmonology  [facility]        Video-Visit Details    Type of service:  Video Visit    Video Start Time: 429 PM  Video End Time: 511 PM    Originating Location (pt. Location): Home    Distant Location (provider location):  [facility]     Platform used for Video Visit: Manjula Dudley MD

## 2020-11-17 ENCOUNTER — TELEPHONE (OUTPATIENT)
Dept: NEPHROLOGY | Facility: CLINIC | Age: 45
End: 2020-11-17

## 2020-12-03 ENCOUNTER — TELEPHONE (OUTPATIENT)
Dept: NEPHROLOGY | Facility: CLINIC | Age: 45
End: 2020-12-03

## 2020-12-03 NOTE — TELEPHONE ENCOUNTER
Called and spoke with pt using   Line [phone] (Int #[id number]).    Due to the rise in Covid-19 cases Dr. Dudley is recommending appt 12/07/2020 in clinic be converted to [facility].    Pt agreed with plan appt changed to Telephone Visit per pt request.  Lab appt changed to 12/04/2020 at 2:20pm.    Yenni Leavitt LPN

## 2021-05-09 ENCOUNTER — OFFICE VISIT (OUTPATIENT)
Dept: URGENT CARE | Facility: URGENT CARE | Age: 46
End: 2021-05-09

## 2021-05-09 ENCOUNTER — HOSPITAL ENCOUNTER (INPATIENT)
Facility: CLINIC | Age: 46
LOS: 8 days | Discharge: HOME | DRG: 673 | End: 2021-05-17
Attending: INTERNAL MEDICINE | Admitting: INTERNAL MEDICINE

## 2021-05-09 VITALS
BODY MASS INDEX: 18.77 KG/M2 | OXYGEN SATURATION: 100 % | TEMPERATURE: 96.9 F | RESPIRATION RATE: 16 BRPM | HEART RATE: 80 BPM | WEIGHT: 116.3 LBS | DIASTOLIC BLOOD PRESSURE: 112 MMHG | SYSTOLIC BLOOD PRESSURE: 228 MMHG

## 2021-05-09 DIAGNOSIS — I16.1 HYPERTENSIVE EMERGENCY: Primary | ICD-10-CM

## 2021-05-09 DIAGNOSIS — N17.9 ACUTE RENAL FAILURE, UNSPECIFIED ACUTE RENAL FAILURE TYPE (H): Primary | ICD-10-CM

## 2021-05-09 DIAGNOSIS — R10.13 ABDOMINAL PAIN, EPIGASTRIC: ICD-10-CM

## 2021-05-09 LAB
ALBUMIN UR-MCNC: 200 MG/DL
APPEARANCE UR: CLEAR
BACTERIA #/AREA URNS HPF: ABNORMAL /HPF
BILIRUB UR QL STRIP: NEGATIVE
COLOR UR AUTO: ABNORMAL
CREAT SERPL-MCNC: 12.9 MG/DL (ref 0.66–1.25)
CREAT UR-MCNC: 38 MG/DL
FRACT EXCRET NA UR+SERPL-RTO: 21.9 %
GFR SERPL CREATININE-BSD FRML MDRD: 4 ML/MIN/{1.73_M2}
GLUCOSE UR STRIP-MCNC: 50 MG/DL
HGB UR QL STRIP: ABNORMAL
KETONES UR STRIP-MCNC: NEGATIVE MG/DL
LEUKOCYTE ESTERASE UR QL STRIP: NEGATIVE
MUCOUS THREADS #/AREA URNS LPF: PRESENT /LPF
NITRATE UR QL: NEGATIVE
PH UR STRIP: 6.5 PH (ref 5–7)
RBC #/AREA URNS AUTO: 141 /HPF (ref 0–2)
SODIUM SERPL-SCNC: 136 MMOL/L (ref 133–144)
SODIUM UR-SCNC: 88 MMOL/L
SOURCE: ABNORMAL
SP GR UR STRIP: 1.01 (ref 1–1.03)
SQUAMOUS #/AREA URNS AUTO: <1 /HPF (ref 0–1)
UROBILINOGEN UR STRIP-MCNC: 0 MG/DL (ref 0–2)
WBC #/AREA URNS AUTO: 1 /HPF (ref 0–5)

## 2021-05-09 PROCEDURE — 210N000002 HC R&B HEART CARE

## 2021-05-09 PROCEDURE — 99207 PR APP CREDIT; MD BILLING SHARED VISIT: CPT | Performed by: PHYSICIAN ASSISTANT

## 2021-05-09 PROCEDURE — 99207 PR INPT ADMISSION FROM CLINIC: CPT | Performed by: NURSE PRACTITIONER

## 2021-05-09 PROCEDURE — 84300 ASSAY OF URINE SODIUM: CPT | Performed by: PHYSICIAN ASSISTANT

## 2021-05-09 PROCEDURE — 258N000003 HC RX IP 258 OP 636: Performed by: PHYSICIAN ASSISTANT

## 2021-05-09 PROCEDURE — 84295 ASSAY OF SERUM SODIUM: CPT | Performed by: PHYSICIAN ASSISTANT

## 2021-05-09 PROCEDURE — 82565 ASSAY OF CREATININE: CPT | Performed by: PHYSICIAN ASSISTANT

## 2021-05-09 PROCEDURE — 250N000011 HC RX IP 250 OP 636: Performed by: PHYSICIAN ASSISTANT

## 2021-05-09 PROCEDURE — 82570 ASSAY OF URINE CREATININE: CPT | Performed by: PHYSICIAN ASSISTANT

## 2021-05-09 PROCEDURE — 36415 COLL VENOUS BLD VENIPUNCTURE: CPT | Performed by: PHYSICIAN ASSISTANT

## 2021-05-09 PROCEDURE — 99223 1ST HOSP IP/OBS HIGH 75: CPT | Mod: AI | Performed by: INTERNAL MEDICINE

## 2021-05-09 PROCEDURE — 81001 URINALYSIS AUTO W/SCOPE: CPT | Performed by: PHYSICIAN ASSISTANT

## 2021-05-09 PROCEDURE — 250N000013 HC RX MED GY IP 250 OP 250 PS 637: Performed by: PHYSICIAN ASSISTANT

## 2021-05-09 RX ORDER — LABETALOL HYDROCHLORIDE 5 MG/ML
10 INJECTION, SOLUTION INTRAVENOUS
Status: DISCONTINUED | OUTPATIENT
Start: 2021-05-09 | End: 2021-05-17 | Stop reason: HOSPADM

## 2021-05-09 RX ORDER — CEFTRIAXONE 1 G/1
1 INJECTION, POWDER, FOR SOLUTION INTRAMUSCULAR; INTRAVENOUS EVERY 24 HOURS
Status: COMPLETED | OUTPATIENT
Start: 2021-05-09 | End: 2021-05-13

## 2021-05-09 RX ORDER — ONDANSETRON 2 MG/ML
4 INJECTION INTRAMUSCULAR; INTRAVENOUS EVERY 6 HOURS PRN
Status: DISCONTINUED | OUTPATIENT
Start: 2021-05-09 | End: 2021-05-17 | Stop reason: HOSPADM

## 2021-05-09 RX ORDER — PROCHLORPERAZINE MALEATE 5 MG
10 TABLET ORAL EVERY 6 HOURS PRN
Status: DISCONTINUED | OUTPATIENT
Start: 2021-05-09 | End: 2021-05-17 | Stop reason: HOSPADM

## 2021-05-09 RX ORDER — AMOXICILLIN 250 MG
2 CAPSULE ORAL 2 TIMES DAILY PRN
Status: DISCONTINUED | OUTPATIENT
Start: 2021-05-09 | End: 2021-05-17 | Stop reason: HOSPADM

## 2021-05-09 RX ORDER — NALOXONE HYDROCHLORIDE 0.4 MG/ML
0.4 INJECTION, SOLUTION INTRAMUSCULAR; INTRAVENOUS; SUBCUTANEOUS
Status: DISCONTINUED | OUTPATIENT
Start: 2021-05-09 | End: 2021-05-11

## 2021-05-09 RX ORDER — OXYCODONE AND ACETAMINOPHEN 5; 325 MG/1; MG/1
1-2 TABLET ORAL EVERY 4 HOURS PRN
Status: DISCONTINUED | OUTPATIENT
Start: 2021-05-09 | End: 2021-05-17 | Stop reason: HOSPADM

## 2021-05-09 RX ORDER — NALOXONE HYDROCHLORIDE 0.4 MG/ML
0.2 INJECTION, SOLUTION INTRAMUSCULAR; INTRAVENOUS; SUBCUTANEOUS
Status: DISCONTINUED | OUTPATIENT
Start: 2021-05-09 | End: 2021-05-17 | Stop reason: HOSPADM

## 2021-05-09 RX ORDER — AMOXICILLIN 250 MG
1 CAPSULE ORAL 2 TIMES DAILY PRN
Status: DISCONTINUED | OUTPATIENT
Start: 2021-05-09 | End: 2021-05-17 | Stop reason: HOSPADM

## 2021-05-09 RX ORDER — LIDOCAINE 40 MG/G
CREAM TOPICAL
Status: DISCONTINUED | OUTPATIENT
Start: 2021-05-09 | End: 2021-05-17 | Stop reason: HOSPADM

## 2021-05-09 RX ORDER — SODIUM CHLORIDE 9 MG/ML
INJECTION, SOLUTION INTRAVENOUS CONTINUOUS
Status: DISCONTINUED | OUTPATIENT
Start: 2021-05-09 | End: 2021-05-10

## 2021-05-09 RX ORDER — AZITHROMYCIN 250 MG/1
250 TABLET, FILM COATED ORAL DAILY
Status: COMPLETED | OUTPATIENT
Start: 2021-05-10 | End: 2021-05-13

## 2021-05-09 RX ORDER — ACETAMINOPHEN 325 MG/1
650 TABLET ORAL EVERY 4 HOURS PRN
Status: DISCONTINUED | OUTPATIENT
Start: 2021-05-09 | End: 2021-05-17 | Stop reason: HOSPADM

## 2021-05-09 RX ORDER — ONDANSETRON 4 MG/1
4 TABLET, ORALLY DISINTEGRATING ORAL EVERY 6 HOURS PRN
Status: DISCONTINUED | OUTPATIENT
Start: 2021-05-09 | End: 2021-05-17 | Stop reason: HOSPADM

## 2021-05-09 RX ORDER — CALCIUM CARBONATE 500 MG/1
1000 TABLET, CHEWABLE ORAL 4 TIMES DAILY PRN
Status: DISCONTINUED | OUTPATIENT
Start: 2021-05-09 | End: 2021-05-17 | Stop reason: HOSPADM

## 2021-05-09 RX ORDER — PROCHLORPERAZINE 25 MG
25 SUPPOSITORY, RECTAL RECTAL EVERY 12 HOURS PRN
Status: DISCONTINUED | OUTPATIENT
Start: 2021-05-09 | End: 2021-05-17 | Stop reason: HOSPADM

## 2021-05-09 RX ORDER — POLYETHYLENE GLYCOL 3350 17 G/17G
17 POWDER, FOR SOLUTION ORAL DAILY PRN
Status: DISCONTINUED | OUTPATIENT
Start: 2021-05-09 | End: 2021-05-17 | Stop reason: HOSPADM

## 2021-05-09 RX ORDER — AMLODIPINE BESYLATE 10 MG/1
10 TABLET ORAL DAILY
Status: DISCONTINUED | OUTPATIENT
Start: 2021-05-10 | End: 2021-05-17 | Stop reason: HOSPADM

## 2021-05-09 RX ORDER — HYDRALAZINE HYDROCHLORIDE 20 MG/ML
10 INJECTION INTRAMUSCULAR; INTRAVENOUS EVERY 4 HOURS PRN
Status: DISCONTINUED | OUTPATIENT
Start: 2021-05-09 | End: 2021-05-17 | Stop reason: HOSPADM

## 2021-05-09 RX ADMIN — ACETAMINOPHEN 650 MG: 325 TABLET, FILM COATED ORAL at 23:24

## 2021-05-09 RX ADMIN — CEFTRIAXONE SODIUM 1 G: 1 INJECTION, POWDER, FOR SOLUTION INTRAMUSCULAR; INTRAVENOUS at 21:54

## 2021-05-09 RX ADMIN — SENNOSIDES AND DOCUSATE SODIUM 1 TABLET: 8.6; 5 TABLET ORAL at 18:12

## 2021-05-09 RX ADMIN — Medication 1 MG: at 23:24

## 2021-05-09 RX ADMIN — SODIUM CHLORIDE: 9 INJECTION, SOLUTION INTRAVENOUS at 18:11

## 2021-05-09 ASSESSMENT — PAIN SCALES - GENERAL: PAINLEVEL: NO PAIN (0)

## 2021-05-09 ASSESSMENT — MIFFLIN-ST. JEOR
SCORE: 1349.82
SCORE: 1443.26

## 2021-05-09 NOTE — PROGRESS NOTES
Assessment & Plan     Hypertensive emergency      Abdominal pain, epigastric           For hypertensive emergency advised patient be evaluated immediately in emergency room. Patient refuses aspirin and ambulance and is agreeable to ER evaluation now. Again discussed blood pressure is life threatening and recommended ambulance but patient declines. His brother will drive him and he is discharged in stable condition.     Tosha Walker NP  [facility] [place] KIRSTIE Turner is a 45 year old male who presents to clinic today for the following health issues:  Chief Complaint   Patient presents with     Abdominal Pain     sx started with having difficulty eating, indiagestion, and tired and feel warm-sx started after got covid shots on 4/24     Fatigue     Turkish video  used via Ipad:    Patient presents for evaluation of abdominal pain for the past 2 weeks which has been stable. He has chest pain when he coughs, not at rest. Pain is rated 7/10 and he has not taken anything for it. Blood pressure is severely elevated today. He did not take his blood pressure medication for a few months since he felt fine. He has been fatigued for the past 2 weeks. Nothing tried for symptoms. No history of heart attack or stroke. Denies shortness of breath, dizziness, weakness.     Problem list, Medication list, Allergies, and Medical history reviewed in EPIC.    ROS:  Review of systems negative except for noted above        Objective    BP (!) 228/112   Pulse 80   Temp 96.9  F (36.1  C) (Oral)   Resp 16   Wt 52.8 kg (116 lb 4.8 oz)   SpO2 100%   BMI 18.77 kg/m    Physical Exam  Constitutional:       General: He is not in acute distress.     Appearance: He is not toxic-appearing or diaphoretic.   Cardiovascular:      Rate and Rhythm: Normal rate and regular rhythm.      Heart sounds: Normal heart sounds.   Pulmonary:      Effort: Pulmonary effort is normal. No respiratory  distress.      Breath sounds: Normal breath sounds. No wheezing, rhonchi or rales.   Lymphadenopathy:      Cervical: No cervical adenopathy.   Skin:     General: Skin is warm and dry.   Neurological:      General: No focal deficit present.      Mental Status: He is alert and oriented to person, place, and time.

## 2021-05-09 NOTE — PHARMACY-ADMISSION MEDICATION HISTORY
Pharmacy Medication History  Admission medication history interview status for the 5/9/2021  admission is complete. See EPIC admission navigator for prior to admission medications     Location of Interview: Patient room  Medication history sources: Patient with  on Jabber application    Significant changes made to the medication list:  Deleted Amlodipine and Carvedilol.  Patient stopped taking several months ago      Additional medication history information:   OTC Cold/Flu treatment x past 3 days     Medication reconciliation completed by provider prior to medication history? No    Time spent in this activity: 15 minutes    Prior to Admission medications    Medication Sig Last Dose Taking? Auth Provider   Pseudoeph-Doxylamine-DM-APAP (DAYQUIL/NYQUIL COLD/FLU RELIEF OR) Take 30 mLs by mouth every 4 hours as needed (Cold/Flu symptoms)  5/9/2021 at Unknown time Yes Reported, Patient       The information provided in this note is only as accurate as the sources available at the time of update(s)

## 2021-05-09 NOTE — H&P
[facility]    History and Physical  Hospitalist       Date of Admission:  5/9/2021  Date of Service (when I saw the patient): 05/09/21    Assessment & Plan   Johnny Bingham is a 45 year old male with a past medical history of hypertension and chronic kidney disease who presented to an outside hospital due to fatigue, generalized weakness, cough, and low urine output.  He is found to have acute renal failure, along with hypertensive urgency and concern for possible community-acquired pneumonia.  He was transferred here to [facility] for admission and further treatment.    Acute kidney injury  Chronic kidney disease  He was hospitalized here from 9/9/2020 to 9/13/2020 with hypertensive urgency and finding of chronic kidney disease stage V without clear etiology.  Nephrology evaluated the patient evaluated the patient at that time.  He was to follow-up as outpatient with nephrology, but has not yet done so.  Last creatinine here was 5.58.  Creatinine is now 12.9.  BUN elevated.  Electrolytes WNL.  Appears dry on exam.  --Nephrology consulted  --NS IV fluids at 75 mL/h  --Repeat BMP in a.m.  --Avoid nephrotoxins  --Urinalysis without evidence of infection.  --Fena pending  --CT abdomen/pelvis does not show evidence of urinary obstruction, although reportedly had large output once Miles catheter placed.  Consider renal ultrasound.  --Continue Miles catheter, intake and output    Hypertensive urgency  Patient was admitted in September 2020 with hypertensive urgency, with symptoms of headache and lightheadedness.  He was started on amlodipine and Coreg.  Patient states he is stopped taking any blood pressure medications at home.  He does not check his blood pressures regularly.  Now presents with blood pressures up to 236/141 on arrival to the ER.  --Required nicardipine drip at ER, but blood pressures have improved so this is no longer needed.  --BP still elevated, trending 150s  systolic.  --Labetalol IV and hydralazine IV as needed  --Restart amlodipine 10 mg daily    Community-acquired pneumonia  Possible infiltrate on CXR.  Patient reports productive cough.  Afebrile.  No evidence for sepsis.  Treated with cefdinir and azithromycin in ER.  CT shows patchy bibasilar airspace opacities, favor atelectasis over pneumonia per radiology.  --Continue treatment with IV ceftriaxone, and p.o. azithromycin  --Monitor for worsening signs of infection.  --Pulmonary hygiene  --CBC in a.m.    Cardiomegaly  Seen on imaging  --Check echocardiogram.    Physical deconditioning/generalized weakness  Likely related to renal failure with uremia, and possibly pneumonia.  --Treat the above issues, and monitor  --PT/OT to eval     Elevated ALT and alkaline phosphatase  --CMP in a.m.      Diet: Combination Diet Renal Diet  Room Service     DVT Prophylaxis: Pneumatic Compression Devices   Miles Catheter: in place, indication: Other (Comment)(DWAYNE)  Code Status: Full Code     Disposition Plan    Expected discharge: Inpatient status, anticipate 2-3 days of hospitalization for adequate work-up and treatment of DWAYNE, hypertension, generalized weakness, and community-acquired pneumonia.  Anticipate patient will be able to discharge back to previous living setting.  SW/CC consulted.    The patient has been discussed with Dr. La, who agrees with the assessment and plan at this time.     A Gabonese speaking  was utilized via iPad for this encounter.    Entered: VENICE Escudero 05/09/2021, 6:05 PM        Galen Conte    Primary Care Physician   Physician No Ref-Primary    Chief Complaint   Generalized weakness, cough    History is obtained from the patient    History of Present Illness   Patient was seen with the use of a Gabonese speaking  via iPad.    Johnny Bingham is a 45 year old male with a past medical history of hypertension and chronic kidney disease who presented  to an outside hospital due to fatigue, generalized weakness, cough, and low urine output.  He is found to have acute renal failure, along with hypertensive urgency and concern for possible community-acquired pneumonia.    Patient reports that he has felt generally weak for almost the last month, which has progressively gotten worse.  He states that it is difficult to walk at times.  He reports intermittent shortness of breath.  Denies any chest pain.  He states that he has had some headaches, but not currently.  He has had low urine output.  He states that he is eating and drinking regularly.  He states that he stopped taking his blood pressure medication, as he did not think he needed it.  He was hospitalized here from 9/9/2020 to 9/13/2020 with hypertensive urgency and finding of chronic kidney disease stage V without clear etiology.  Nephrology evaluated the patient evaluated the patient at that time.  He was to follow-up as outpatient with nephrology, but has not yet done so.  Last creatinine here was 5.58.    Patient was seen in outside hospital emergency department.  Blood pressure was up to 236/141 on arrival.  Pulses normal.  Satting greater than 90% on room air.  Creatinine was as high as 14.3.  , potassium 4.3, sodium 137.  Calcium 7.4.  LFTs notable for ALT 72, alkaline phosphatase 148, otherwise normal.  COVID-19 testing negative.  Urinalysis notable for proteinuria, moderate blood, 100 glucose.  No evidence for infection.  Patient did report difficulty urinating to prior staff, but denies this to me.  He denies any dysuria, frequency, urgency.  Denies any fever, chills.  Had a headache earlier, now resolved.    Past Medical History    I have reviewed this patient's medical history and updated it with pertinent information if needed.   Past Medical History:   Diagnosis Date     CKD (chronic kidney disease) stage 5, GFR less than 15 ml/min (H)      Hypertension        Past Surgical History   I have  reviewed this patient's surgical history and updated it with pertinent information if needed.  Patient denies any previous surgeries.    Prior to Admission Medications   Prior to Admission Medications   Prescriptions Last Dose Informant Patient Reported? Taking?   Pseudoeph-Doxylamine-DM-APAP (DAYQUIL/NYQUIL COLD/FLU RELIEF OR) 5/9/2021 at Unknown time  Yes Yes   Sig: Take 30 mLs by mouth every 4 hours as needed (Cold/Flu symptoms)       Facility-Administered Medications: None     Allergies   No Known Allergies    Social History   Patient lives independently.  He currently smokes 2 to 3 cigarettes/day.  He denies alcohol use.  Denies illicit drug use.    Family History   I have reviewed this patient's family history and updated it with pertinent information if needed.   Family History   Problem Relation Age of Onset     Heart Disease Mother      Kidney Disease Father      Heart Disease Father      Diabetes Brother      Colon Cancer No family hx of      Prostate Cancer No family hx of        Review of Systems   The 10 point Review of Systems is negative other than noted in the HPI.    Physical Exam       BP: (!) 148/99 Pulse: 74   Resp: 14 SpO2: 100 % O2 Device: None (Room air)    Vital Signs with Ranges  Temp:  [96.9  F (36.1  C)] 96.9  F (36.1  C)  Pulse:  [74-80] 74  Resp:  [11-16] 14  BP: (144-233)/() 148/99  SpO2:  [100 %] 100 %  137 lbs 14.4 oz    Constitutional: Well-appearing male, awake, alert, cooperative, lying in bed in NAD.  ENT: Normocephalic, without obvious abnormality, atraumatic, oral pharynx with moist mucus membranes, tonsils without erythema or exudates.  Eyes pupils are equal, round and reactive to light; extra occular movements intact.  Normal sclera.    Neck: Supple, symmetrical, trachea midline, no adenopathy.  Pulmonary: No increased work of breathing, good air exchange, clear to auscultation bilaterally, no crackles or wheezing.  Cardiovascular: Regular rate and rhythm, normal S1 and  S2, and no murmur noted.  GI: Normal bowel sounds, soft, non-distended, non-tender.   Skin/Integumen: Warm, dry, no rashes.  Neuro: CN II-XII grossly intact.  Moves all 4 extremities equally with normal strength.  Coordination grossly normal.  No facial droop.  Speech seemingly normal.  Psych:  Alert and oriented to self, place, situation. Normal affect.  Extremities: No lower extremity edema noted, and calves are non-tender to palpation bilaterally. Dorsal pedal pulses and posterior tibial pulses palpable.      Data   Data reviewed today: Reviewed from care everywhere  EKG:  (1255 Hours):  Indication: High blood pressure   Ventricular Rate: 76   QRS Axis: 67   Intervals: , QRSD 83, QTc 480   Interpretation: Sinus rhythm. Left atrial enlargement. Possible left ventricular hypertrophy. Compared to ECG 02/25/2017, Atrial abnormality now present. Early repolarization no longer present. No STEMI.     Laboratory:  SARS-CoV-2 by rapid PCR: Negative for SARS-CoV-2 RNA by PCR  LFTs: ALT 72 (H), ALKP 148 (H) o/w WNL   (1254) Troponin: <0.056 (WNL)  BMP: CL 97 (L),  (HH), CREAT1 14.39 (HH), EGFRA 4 (L),  (H), CA 7.4 (L) o/w WNL   CBC w/diff: RBC 2.84 (L), HGB 7.8 (L), Hematocrit 24.0 (L) o/w WNL (WBC 7.1, )  ETOH: <3 (WNL)  Urinalysis macroscopic w/ microscopy: GLUC-U 100 (A), Protein >=300 (A), Occult blood moderate (A) o/w WNL or negative   FENa %: Pending    Imaging:  X-RAY CHEST PA/LATERAL  Impression:  Vague increased density in both lungs suggestive of pneumonia.   Reading per Radiology.     CT ABDOMEN & PELVIS W/O ORAL W/O IV CON  Impression:  1.  Patchy bibasilar airspace opacities, favor atelectasis over pneumonia.   2.  Enlarged heart.   3.  No abnormal finding in the abdomen or pelvis by this noncontrast exam.   Reading per Radiology.     Recent Labs   Lab 05/09/21  1832      CR 12.90*       No results found for this or any previous visit (from the past 24 hour(s)).

## 2021-05-09 NOTE — LETTER
Dialysis Intake Checklist      Your Name: Aura Perez RN Contact Phone Number: [phone]     Fax Number and Email: [email or web address]: [email or web address]     Patient Name: Johnny Bingham   Referring Nephrologist: Dr. Gomez     Requested Facility(s) or Zip Code: [address]     Patient Started Date of Dialysis: 05/10/2021      Estimated Outpatient Start Date of Dialysis: 05/15/2021   Treatment Duration & Frequency: MWF     Preferred Schedule: Monday PM     Is the patient employed? Yes   Is there a working shift we can accommodate?  No   Is patient flexible? Yes Shift: PM       Modality:   In-Center Hemo   Diagnosis:   End Stage Renal Disease (ESRD - Stage 5 Chronic Kidney Disease)     Access Type(s):   CVC    If only a CVC, is there an active AV Access Plan (e.g., Vessel Mapping, Surgeon Consult, etc.)?:   TBD         Where will this patient be discharged to? Home     Is this patient trach or vent dependent? No     Does this patient have an L-VAD or Life Vest? No     Does this patient have outpatient dialysis history? No     Does this patient receive continuous medication via infusion pumps? No     Daily Care - Activity Management/Activity assistance needed?   Activity Management: ambulated to bathroom   Activity Assistance Provided: assistance, stand-by          Can this patient sit in a standard chair to dialyze? Yes:      Require treatment in a bed?  No     Is the patient HEP B positive?see below     Can this patient sign their own legal consents? Yes:      Other special needs? Dutch speaking       Allergies: No Known Allergies     Medication List:   Current Facility-Administered Medications   Medication     acetaminophen (TYLENOL) tablet 650 mg     amLODIPine (NORVASC) tablet 10 mg     azithromycin (ZITHROMAX) tablet 250 mg     calcium carbonate (TUMS) chewable tablet 1,000 mg     carvedilol (COREG) tablet 3.125 mg     cefTRIAXone (ROCEPHIN) 1 g vial to  attach to  mL bag for ADULTS or NS 50 mL bag for PEDS     hydrALAZINE (APRESOLINE) injection 10 mg     labetalol (NORMODYNE/TRANDATE) injection 10 mg     lidocaine (LMX4) cream     lidocaine 1 % 0.1-1 mL     melatonin tablet 1 mg     multivitamin RENAL (NEPHROCAPS/TRIPHROCAPS) capsule 1 capsule     naloxone (NARCAN) injection 0.2 mg    Or     naloxone (NARCAN) injection 0.4 mg    Or     naloxone (NARCAN) injection 0.2 mg    Or     naloxone (NARCAN) injection 0.4 mg     ondansetron (ZOFRAN-ODT) ODT tab 4 mg    Or     ondansetron (ZOFRAN) injection 4 mg     oxyCODONE-acetaminophen (PERCOCET) 5-325 MG per tablet 1-2 tablet     polyethylene glycol (MIRALAX) Packet 17 g     prochlorperazine (COMPAZINE) injection 10 mg    Or     prochlorperazine (COMPAZINE) tablet 10 mg    Or     prochlorperazine (COMPAZINE) suppository 25 mg     senna-docusate (SENOKOT-S/PERICOLACE) 8.6-50 MG per tablet 1 tablet    Or     senna-docusate (SENOKOT-S/PERICOLACE) 8.6-50 MG per tablet 2 tablet     sodium chloride (PF) 0.9% PF flush 3 mL     sodium chloride (PF) 0.9% PF flush 3 mL          HEP Panel:  Hep B Antigen (HBsAG):   Lab Results   Component Value Date    HEPBANG Nonreactive 09/12/2020     Hep B Surface Antibody (HBsAb):   Lab Results   Component Value Date    AUSAB 3.79 09/12/2020     Hep B Total Core Antibody: No results found for: HBCAB     Chest X-Ray:         PPD:  M TUBERCULOSIS RESULT: No results found for: TBRSLT  M TUBERCULOSIS ANTIGEN VALUE: No results found for: TBAGN

## 2021-05-10 ENCOUNTER — APPOINTMENT (OUTPATIENT)
Dept: CARDIOLOGY | Facility: CLINIC | Age: 46
DRG: 673 | End: 2021-05-10
Attending: PHYSICIAN ASSISTANT

## 2021-05-10 ENCOUNTER — APPOINTMENT (OUTPATIENT)
Dept: INTERVENTIONAL RADIOLOGY/VASCULAR | Facility: CLINIC | Age: 46
DRG: 673 | End: 2021-05-10
Attending: INTERNAL MEDICINE

## 2021-05-10 LAB
ABO + RH BLD: NORMAL
ABO + RH BLD: NORMAL
ALBUMIN SERPL-MCNC: 2.7 G/DL (ref 3.4–5)
ALP SERPL-CCNC: 106 U/L (ref 40–150)
ALT SERPL W P-5'-P-CCNC: 47 U/L (ref 0–70)
ANION GAP SERPL CALCULATED.3IONS-SCNC: 11 MMOL/L (ref 3–14)
AST SERPL W P-5'-P-CCNC: 12 U/L (ref 0–45)
BILIRUB SERPL-MCNC: 0.3 MG/DL (ref 0.2–1.3)
BLD GP AB SCN SERPL QL: NORMAL
BLOOD BANK CMNT PATIENT-IMP: NORMAL
BUN SERPL-MCNC: 118 MG/DL (ref 7–30)
CALCIUM SERPL-MCNC: 7.2 MG/DL (ref 8.5–10.1)
CHLORIDE SERPL-SCNC: 106 MMOL/L (ref 94–109)
CO2 SERPL-SCNC: 18 MMOL/L (ref 20–32)
CREAT SERPL-MCNC: 12.6 MG/DL (ref 0.66–1.25)
ERYTHROCYTE [DISTWIDTH] IN BLOOD BY AUTOMATED COUNT: 13.5 % (ref 10–15)
FERRITIN SERPL-MCNC: 472 NG/ML (ref 26–388)
GFR SERPL CREATININE-BSD FRML MDRD: 4 ML/MIN/{1.73_M2}
GLUCOSE SERPL-MCNC: 116 MG/DL (ref 70–99)
HBV CORE AB SERPL QL IA: NONREACTIVE
HBV SURFACE AB SERPL IA-ACNC: 3.45 M[IU]/ML
HBV SURFACE AG SERPL QL IA: NONREACTIVE
HCT VFR BLD AUTO: 19.4 % (ref 40–53)
HGB BLD-MCNC: 6.2 G/DL (ref 13.3–17.7)
HGB BLD-MCNC: 7.4 G/DL (ref 13.3–17.7)
INR PPP: 1.02 (ref 0.86–1.14)
IRON SATN MFR SERPL: 15 % (ref 15–46)
IRON SERPL-MCNC: 43 UG/DL (ref 35–180)
MCH RBC QN AUTO: 27.4 PG (ref 26.5–33)
MCHC RBC AUTO-ENTMCNC: 32 G/DL (ref 31.5–36.5)
MCV RBC AUTO: 86 FL (ref 78–100)
PLATELET # BLD AUTO: 128 10E9/L (ref 150–450)
POTASSIUM SERPL-SCNC: 4.3 MMOL/L (ref 3.4–5.3)
PROT SERPL-MCNC: 6.1 G/DL (ref 6.8–8.8)
RBC # BLD AUTO: 2.26 10E12/L (ref 4.4–5.9)
SODIUM SERPL-SCNC: 135 MMOL/L (ref 133–144)
SPECIMEN EXP DATE BLD: NORMAL
TIBC SERPL-MCNC: 287 UG/DL (ref 240–430)
WBC # BLD AUTO: 6.2 10E9/L (ref 4–11)

## 2021-05-10 PROCEDURE — 86704 HEP B CORE ANTIBODY TOTAL: CPT | Performed by: INTERNAL MEDICINE

## 2021-05-10 PROCEDURE — 85027 COMPLETE CBC AUTOMATED: CPT | Performed by: PHYSICIAN ASSISTANT

## 2021-05-10 PROCEDURE — 85610 PROTHROMBIN TIME: CPT | Performed by: INTERNAL MEDICINE

## 2021-05-10 PROCEDURE — 250N000009 HC RX 250: Performed by: NURSE PRACTITIONER

## 2021-05-10 PROCEDURE — 86706 HEP B SURFACE ANTIBODY: CPT | Performed by: INTERNAL MEDICINE

## 2021-05-10 PROCEDURE — 0JH63XZ INSERTION OF TUNNELED VASCULAR ACCESS DEVICE INTO CHEST SUBCUTANEOUS TISSUE AND FASCIA, PERCUTANEOUS APPROACH: ICD-10-PCS | Performed by: RADIOLOGY

## 2021-05-10 PROCEDURE — 99152 MOD SED SAME PHYS/QHP 5/>YRS: CPT

## 2021-05-10 PROCEDURE — 86901 BLOOD TYPING SEROLOGIC RH(D): CPT | Performed by: HOSPITALIST

## 2021-05-10 PROCEDURE — 250N000011 HC RX IP 250 OP 636: Performed by: INTERNAL MEDICINE

## 2021-05-10 PROCEDURE — 258N000003 HC RX IP 258 OP 636: Performed by: INTERNAL MEDICINE

## 2021-05-10 PROCEDURE — 80053 COMPREHEN METABOLIC PANEL: CPT | Performed by: PHYSICIAN ASSISTANT

## 2021-05-10 PROCEDURE — 99255 IP/OBS CONSLTJ NEW/EST HI 80: CPT | Performed by: INTERNAL MEDICINE

## 2021-05-10 PROCEDURE — 272N000602 HC WOUND GLUE CR1

## 2021-05-10 PROCEDURE — 86850 RBC ANTIBODY SCREEN: CPT | Performed by: HOSPITALIST

## 2021-05-10 PROCEDURE — 250N000011 HC RX IP 250 OP 636: Performed by: PHYSICIAN ASSISTANT

## 2021-05-10 PROCEDURE — 86900 BLOOD TYPING SEROLOGIC ABO: CPT | Performed by: HOSPITALIST

## 2021-05-10 PROCEDURE — 36415 COLL VENOUS BLD VENIPUNCTURE: CPT | Performed by: INTERNAL MEDICINE

## 2021-05-10 PROCEDURE — 250N000013 HC RX MED GY IP 250 OP 250 PS 637: Performed by: INTERNAL MEDICINE

## 2021-05-10 PROCEDURE — C1750 CATH, HEMODIALYSIS,LONG-TERM: HCPCS

## 2021-05-10 PROCEDURE — 210N000002 HC R&B HEART CARE

## 2021-05-10 PROCEDURE — 02H633Z INSERTION OF INFUSION DEVICE INTO RIGHT ATRIUM, PERCUTANEOUS APPROACH: ICD-10-PCS | Performed by: RADIOLOGY

## 2021-05-10 PROCEDURE — 90937 HEMODIALYSIS REPEATED EVAL: CPT

## 2021-05-10 PROCEDURE — 85018 HEMOGLOBIN: CPT | Performed by: INTERNAL MEDICINE

## 2021-05-10 PROCEDURE — 93306 TTE W/DOPPLER COMPLETE: CPT | Mod: 26 | Performed by: INTERNAL MEDICINE

## 2021-05-10 PROCEDURE — 83540 ASSAY OF IRON: CPT | Performed by: INTERNAL MEDICINE

## 2021-05-10 PROCEDURE — 250N000013 HC RX MED GY IP 250 OP 250 PS 637: Performed by: PHYSICIAN ASSISTANT

## 2021-05-10 PROCEDURE — 82728 ASSAY OF FERRITIN: CPT | Performed by: INTERNAL MEDICINE

## 2021-05-10 PROCEDURE — 36415 COLL VENOUS BLD VENIPUNCTURE: CPT | Performed by: PHYSICIAN ASSISTANT

## 2021-05-10 PROCEDURE — C1769 GUIDE WIRE: HCPCS

## 2021-05-10 PROCEDURE — 87340 HEPATITIS B SURFACE AG IA: CPT | Performed by: INTERNAL MEDICINE

## 2021-05-10 PROCEDURE — 634N000001 HC RX 634: Performed by: INTERNAL MEDICINE

## 2021-05-10 PROCEDURE — 83550 IRON BINDING TEST: CPT | Performed by: INTERNAL MEDICINE

## 2021-05-10 PROCEDURE — 250N000011 HC RX IP 250 OP 636: Performed by: NURSE PRACTITIONER

## 2021-05-10 PROCEDURE — 272N000196 HC ACCESSORY CR5

## 2021-05-10 PROCEDURE — 5A1D70Z PERFORMANCE OF URINARY FILTRATION, INTERMITTENT, LESS THAN 6 HOURS PER DAY: ICD-10-PCS | Performed by: INTERNAL MEDICINE

## 2021-05-10 PROCEDURE — 99233 SBSQ HOSP IP/OBS HIGH 50: CPT | Performed by: STUDENT IN AN ORGANIZED HEALTH CARE EDUCATION/TRAINING PROGRAM

## 2021-05-10 PROCEDURE — 93306 TTE W/DOPPLER COMPLETE: CPT

## 2021-05-10 PROCEDURE — 258N000003 HC RX IP 258 OP 636: Performed by: PHYSICIAN ASSISTANT

## 2021-05-10 RX ORDER — NALOXONE HYDROCHLORIDE 0.4 MG/ML
0.4 INJECTION, SOLUTION INTRAMUSCULAR; INTRAVENOUS; SUBCUTANEOUS
Status: DISCONTINUED | OUTPATIENT
Start: 2021-05-10 | End: 2021-05-17 | Stop reason: HOSPADM

## 2021-05-10 RX ORDER — FENTANYL CITRATE 50 UG/ML
25-50 INJECTION, SOLUTION INTRAMUSCULAR; INTRAVENOUS EVERY 5 MIN PRN
Status: DISCONTINUED | OUTPATIENT
Start: 2021-05-10 | End: 2021-05-17

## 2021-05-10 RX ORDER — FLUMAZENIL 0.1 MG/ML
0.2 INJECTION, SOLUTION INTRAVENOUS
Status: DISCONTINUED | OUTPATIENT
Start: 2021-05-10 | End: 2021-05-17

## 2021-05-10 RX ORDER — NALOXONE HYDROCHLORIDE 0.4 MG/ML
0.2 INJECTION, SOLUTION INTRAMUSCULAR; INTRAVENOUS; SUBCUTANEOUS
Status: DISCONTINUED | OUTPATIENT
Start: 2021-05-10 | End: 2021-05-11

## 2021-05-10 RX ORDER — HEPARIN SODIUM 1000 [USP'U]/ML
3 INJECTION, SOLUTION INTRAVENOUS; SUBCUTANEOUS ONCE
Status: DISCONTINUED | OUTPATIENT
Start: 2021-05-10 | End: 2021-05-17 | Stop reason: HOSPADM

## 2021-05-10 RX ORDER — CARVEDILOL 3.12 MG/1
3.12 TABLET ORAL 2 TIMES DAILY WITH MEALS
Status: DISCONTINUED | OUTPATIENT
Start: 2021-05-10 | End: 2021-05-11

## 2021-05-10 RX ORDER — HEPARIN SODIUM 200 [USP'U]/100ML
1 INJECTION, SOLUTION INTRAVENOUS CONTINUOUS PRN
Status: DISCONTINUED | OUTPATIENT
Start: 2021-05-10 | End: 2021-05-17

## 2021-05-10 RX ADMIN — SODIUM CHLORIDE: 9 INJECTION, SOLUTION INTRAVENOUS at 08:19

## 2021-05-10 RX ADMIN — CARVEDILOL 3.12 MG: 3.12 TABLET, FILM COATED ORAL at 20:22

## 2021-05-10 RX ADMIN — HEPARIN SODIUM 3000 UNITS: 1000 INJECTION INTRAVENOUS; SUBCUTANEOUS at 17:58

## 2021-05-10 RX ADMIN — AZITHROMYCIN MONOHYDRATE 250 MG: 250 TABLET ORAL at 08:15

## 2021-05-10 RX ADMIN — AMLODIPINE BESYLATE 10 MG: 10 TABLET ORAL at 08:15

## 2021-05-10 RX ADMIN — CEFTRIAXONE SODIUM 1 G: 1 INJECTION, POWDER, FOR SOLUTION INTRAMUSCULAR; INTRAVENOUS at 23:05

## 2021-05-10 RX ADMIN — EPOETIN ALFA-EPBX 5000 UNITS: 3000 INJECTION, SOLUTION INTRAVENOUS; SUBCUTANEOUS at 17:57

## 2021-05-10 RX ADMIN — HYDRALAZINE HYDROCHLORIDE 10 MG: 20 INJECTION INTRAMUSCULAR; INTRAVENOUS at 18:46

## 2021-05-10 RX ADMIN — HEPARIN SODIUM 3000 UNITS: 1000 INJECTION INTRAVENOUS; SUBCUTANEOUS at 17:57

## 2021-05-10 RX ADMIN — MIDAZOLAM HYDROCHLORIDE 0.5 MG: 1 INJECTION, SOLUTION INTRAMUSCULAR; INTRAVENOUS at 15:07

## 2021-05-10 RX ADMIN — ONDANSETRON 4 MG: 2 INJECTION INTRAMUSCULAR; INTRAVENOUS at 20:21

## 2021-05-10 RX ADMIN — FENTANYL CITRATE 25 MCG: 50 INJECTION, SOLUTION INTRAMUSCULAR; INTRAVENOUS at 15:07

## 2021-05-10 RX ADMIN — OXYCODONE HYDROCHLORIDE AND ACETAMINOPHEN 1 TABLET: 5; 325 TABLET ORAL at 23:03

## 2021-05-10 RX ADMIN — CARVEDILOL 3.12 MG: 3.12 TABLET, FILM COATED ORAL at 11:07

## 2021-05-10 RX ADMIN — MIDAZOLAM HYDROCHLORIDE 1 MG: 1 INJECTION, SOLUTION INTRAMUSCULAR; INTRAVENOUS at 14:58

## 2021-05-10 RX ADMIN — FENTANYL CITRATE 50 MCG: 50 INJECTION, SOLUTION INTRAMUSCULAR; INTRAVENOUS at 14:58

## 2021-05-10 RX ADMIN — Medication 1 CAPSULE: at 11:08

## 2021-05-10 RX ADMIN — LIDOCAINE HYDROCHLORIDE 13 ML: 10 INJECTION, SOLUTION INFILTRATION; PERINEURAL at 15:16

## 2021-05-10 RX ADMIN — SODIUM CHLORIDE 300 ML: 9 INJECTION, SOLUTION INTRAVENOUS at 17:58

## 2021-05-10 RX ADMIN — ACETAMINOPHEN 650 MG: 325 TABLET, FILM COATED ORAL at 16:31

## 2021-05-10 ASSESSMENT — MIFFLIN-ST. JEOR: SCORE: 1349.75

## 2021-05-10 NOTE — PROGRESS NOTES
Pt hgb 6.2 today, no noted s/s of bleeding, has fluids running. Out of hospital hgb in physical chart shown hgb 7.8. paged on-call MD to update. Dr. Francis called back and will defer to rounding doctor, telephone order to add on type and screen.

## 2021-05-10 NOTE — CONSULTS
Care Management Initial Consult    General Information  Assessment completed with: Johnny Terrazas  Type of CM/SW Visit: Initial Assessment    Primary Care Provider verified and updated as needed: No   Readmission within the last 30 days: no previous admission in last 30 days         Advance Care Planning: Advance Care Planning Reviewed: other (comment)          Communication Assessment  Patient's communication style: spoken language (non-English)    Hearing Difficulty or Deaf: no   Wear Glasses or Blind: no    Cognitive  Cognitive/Neuro/Behavioral: WDL                      Living Environment:   People in home: parent(s), sibling(s)     Current living Arrangements: house      Able to return to prior arrangements:         Family/Social Support:  Care provided by:    Provides care for:    Marital Status: Single  Sibling(s)          Description of Support System:           Current Resources:   Patient receiving home care services: No     Community Resources: None  Equipment currently used at home: none  Supplies currently used at home: None    Employment/Financial:  Employment Status: unemployed        Financial Concerns: insurance, none   Referral to Financial Counselor: Yes, but patient told registration he had insurance. However, through the , he told writer he did not.        Lifestyle & Psychosocial Needs:        Socioeconomic History     Marital status: Single     Spouse name: Not on file     Number of children: Not on file     Years of education: Not on file     Highest education level: Not on file     Tobacco Use     Smoking status: Former Smoker     Smokeless tobacco: Never Used     Tobacco comment: quit 6 months ago -1/2020    Substance and Sexual Activity     Alcohol use: Never     Frequency: Never     Drug use: Never     Sexual activity: Not Currently       Functional Status:  Prior to admission patient needed assistance: Independent             Mental Health Status:  Mental Health Status: No  Current Concerns       Chemical Dependency Status:                Values/Beliefs:  Spiritual, Cultural Beliefs, Buddhism Practices, Values that affect care: no               Additional Information:  Writer sent referral to [facility] for outpatient Dialysis set up. Faxed, checklist, face sheet, IATF notes, which includes H&P, consult notes.    Aura Perez RN

## 2021-05-10 NOTE — PROGRESS NOTES
Potassium   Date Value Ref Range Status   05/10/2021 4.3 3.4 - 5.3 mmol/L Final     Hemoglobin   Date Value Ref Range Status   05/10/2021 6.2 (LL) 13.3 - 17.7 g/dL Final     Comment:     This result has been called to ZEINA DOWNING RN IN CCU by Layton Tong on 05   10 2021 at 0623, and has been read back.        Creatinine   Date Value Ref Range Status   05/10/2021 12.60 (H) 0.66 - 1.25 mg/dL Final     Urea Nitrogen   Date Value Ref Range Status   05/10/2021 118 (H) 7 - 30 mg/dL Final     Sodium   Date Value Ref Range Status   05/10/2021 135 133 - 144 mmol/L Final     INR   Date Value Ref Range Status   05/10/2021 1.02 0.86 - 1.14 Final       DIALYSIS PROCEDURE NOTE  Hepatitis status of previous patient on machine log was checked and verified ok to use with this patients hepatitis status.  Patient dialyzed for 2 hrs. on a K3 bath with a net fluid removal of  0L.  A BFR of 200 ml/min was obtained via a RIJ   Chest area around access swollen Dr Gomez aware  Ran well  Less Pain after tylenol given    The treatment plan was discussed with Dr. Gomez  during the treatment.    Total heparin received during the treatment: 0 units.      Line flushed, clamped and capped with heparin 1:1000 2 mL (2000 units) per lumen    Meds  given: Epo   Complications: None  Post run some blood noted under biopatch does not appear to be actively bleeding  Person educated: Pt with  phone . Knowledge base Minimal . Barriers to learning: language . Educated on access care  via oral  mode. Patient  verbalized understanding.      ICEBOAT? Timeout performed pre-treatment  I: Patient was identified using 2 identifiers  C:  Consent Signed Yes  E: Equipment preventative maintenance is current and dialysis delivery system OK to use  B: Hepatitis B Surface Antigen: UNK; Draw Date: 5.10.21      Hepatitis B Surface Antibody: UNK Draw Date: 5.10.21  O: Dialysis orders present and complete prior to treatment  A: Vascular access verified and  assessed prior to treatment  T: Treatment was performed at a clinically appropriate time  ?: Patient was allowed to ask questions and address concerns prior to treatment  See flowsheet in EPIC for further details and post assessment.  Machine water alarm in place and functioning. Transducer pods intact and checked every 15min.   Pt returned via Cart.  Chlorine/Chloramine water system checked every 4 hours.  Outpatient Dialysis at [facility] [[email or web address]]

## 2021-05-10 NOTE — CONSULTS
[facility]    Nephrology Consultation     Date of Admission:  5/9/2021    Assessment & Plan     Johnny Bingham is a 45 year old male who was admitted on 5/9/2021.     1) CKD 5:  It has progressed over the last few years.  Uncontrolled HTN has lead to a fairly rapid decline in his GFR.  It appears that he has no primary renal disease (renal disease due to HTN), but IgA nephropathy is possible.  No reason to biopsy at this point as he would not be a candidate for immune suppression.  His kidney disease has advanced enough that he needs to start hemodialysis.      2) HTN: Severe.  He has had amlodipine resumed.  He needs carvedilol resumed as well.      3) Anemia:  Due to ESRD.  He needs to start AGUSTÍN.  Need to check iron stores.      4) MBD:  Needs assessment.      5) Placement:  He will need placement in as outpt HD unit of his choice.      Plan:    To IR for Tunneled Dialysis Catheter.  HD to start today  Eval anemia and MBD  HD #2 tomorrow.  Placement in outpt HD unit.        Roberth Gomez MD  [facility] - Nephrology  [phone]    Reason for Consult     I was asked to see the patient for CKD 5.      Primary Care Physician     Physician No Ref-Primary    Chief Complaint     Generalized weakness and cough.      History is obtained from the patient and chart review.      History of Present Illness     Johnny Bingham is a 45 year old male who presents with CKD 5.    He has a long history of kidney disease and severe HTN.  He has had poor medication compliance and poor follow up with medical providers.      Cr was 1.28 in 2017  2.39 in 2019.   5.5 in 2020.      He has seen Dr. Morton as inpt and Dr. Dudley once as outpt.    He was to follow up with her in December but did not appear for a virtual visit.      He is thought to have CKD due to severe HTN.      He has had subnephrotic proteinuria and UA dipstic showing > 300 mg/dL albiumin and moderate blood.  Micro showed no RBCs.       He has had serologies including ANCA, WAN,  C3, C4, ASO, ds-DNA all of which were negative.  No monoclonal spike seen on SPEP and UPEP.      He has not had a renal biopsy.    He went to [place] ED after urgent care found his BP to be .    He had been on antihypertensives, but stopped as he felt he did not need them anymore.      He notes fatigue, cough, upset stomach when eating.    Past Medical History   I have reviewed this patient's medical history and updated it with pertinent information if needed.   Past Medical History:   Diagnosis Date     CKD (chronic kidney disease) stage 5, GFR less than 15 ml/min (H)      Hypertension        Past Surgical History   I have reviewed this patient's surgical history and updated it with pertinent information if needed.  Past Surgical History:   Procedure Laterality Date     NO HISTORY OF SURGERY         Prior to Admission Medications   Prior to Admission Medications   Prescriptions Last Dose Informant Patient Reported? Taking?   Pseudoeph-Doxylamine-DM-APAP (DAYQUIL/NYQUIL COLD/FLU RELIEF OR) 5/9/2021 at Unknown time  Yes Yes   Sig: Take 30 mLs by mouth every 4 hours as needed (Cold/Flu symptoms)       Facility-Administered Medications: None     Allergies   No Known Allergies    Social History   I have reviewed this patient's social history and updated it with pertinent information if needed. Johnny Bingham  reports that he has quit smoking. He has never used smokeless tobacco. He reports that he does not drink alcohol or use drugs.    Family History   I have reviewed this patient's family history and updated it with pertinent information if needed.   Family History   Problem Relation Age of Onset     Heart Disease Mother      Kidney Disease Father      Heart Disease Father      Diabetes Brother      Colon Cancer No family hx of      Prostate Cancer No family hx of        Review of Systems   The 10 point Review of Systems is negative other than noted in the  HPI.     Physical Exam   Temp: 98.4  F (36.9  C) Temp src: Oral BP: (!) 178/116 Pulse: 72   Resp: 20 SpO2: 99 % O2 Device: None (Room air)    Vital Signs with Ranges  Temp:  [96.9  F (36.1  C)-98.4  F (36.9  C)] 98.4  F (36.9  C)  Pulse:  [65-80] 72  Resp:  [10-20] 20  BP: (144-233)/() 178/116  SpO2:  [97 %-100 %] 99 %  117 lbs 4.8 oz    GENERAL: alert, NAD  HEENT:  Normocephalic. Facial and periorbital edema.   Pupils equal.  MMM.  Dentition is fair.   CV: RRR, no murmurs, no clicks, gallops, or rubs, no BLE edema, no carotid bruits  RESP: Clear bilaterally with good efforts  GI: Abdomen soft/nt/nd, BS normal. No masses, organomegaly  MUSCULOSKELETAL: extremities nl - no gross deformities noted  SKIN: no suspicious lesions or rashes, dry to touch  NEURO:  Strength normal and symmetric.   PSYCH: mood good, affect appropriate  LYMPH: No palpable ant/post cervical and supraclavicular adenopathy    Data   BMP  Recent Labs   Lab 05/10/21  0527 05/09/21  1832    136   POTASSIUM 4.3  --    CHLORIDE 106  --    BEAU 7.2*  --    CO2 18*  --    *  --    CR 12.60* 12.90*   *  --      [email or web address](phos:4)  CBC)  Recent Labs   Lab 05/10/21  0527   WBC 6.2   HGB 6.2*   HCT 19.4*   MCV 86   *     Recent Labs   Lab 05/10/21  0527   AST 12   ALT 47   ALKPHOS 106   BILITOTAL 0.3

## 2021-05-10 NOTE — PROGRESS NOTES
Pt A/O, c/o pain with coughing, received tylenol w/ relief. Vitals stable on RA. Up w/ st. by assist. Jd patent,Tele SR.  Following creatinine. Fluids running, antibiotics for CAP. Planing nephrology and echo today.

## 2021-05-10 NOTE — PROGRESS NOTES
Physician Attestation   I, Lennox La, saw and evaluated Johnny Bingham as part of a shared visit.  I have reviewed and discussed with the advanced practice provider their history, physical and plan.    I personally reviewed the vital signs, medications, labs and imaging.    My key history or physical exam findings: Patient interviewed with professional Faroese . 44 yo M with CKD stage V, presented to urgent care with upper abdominal pain, poor intake, fatigue. Noted to be significantly hypertensive and sent to [place] ED. Creatinine 14, 1000 ml out from bladder when Miles placed. CT abd/pelvis without signs of renal obstruction, no significant abdominal pathology. Patient denies any urinary symptoms preceding admission, feels he has been urinating normally.  He denies any chest pain. Reports a cough productive of yellow sputum for 2-3 days.  Stopped taking his blood pressure medications for several months as he reports he felt well. Denies any NSAID use. Reports a bitter taste in his mouth. On exam appears comfortable at rest, heart regular, lungs clear, no edema.     Key management decisions made by me:   Acute kidney injury on CKD stage V  - Pre-renal from poor intake, post-renal from urinary retentionand intrinsic renal from poorly controlled hypertension / hypertensive emergency all possibilities. UA with proteinuria, moderate blood with 141 RBC  - Currently making urine, ~125 ml/hr since arrival  - Continue IVF  - FENa  - Miles for strict I/O  - Nephrology consulted   - Discussed if kidneys do not recover, dialysis is a possibility    Possible community acquired pneumonia  Pneumonia versus atelectasis on CT. Lower suspicion for pulmonary edema as has received multiple boluses without worsening respiratory symptoms. Reports productive cough for 2-3 days.  - Ceftriaxone IV, azithromycin PO     Hypertensive emergency   - Blood pressure control improved  - Continue amlodipine  - Titrate  regimen as needed    Lennox La  Date of Service (when I saw the patient): 05/09/21

## 2021-05-10 NOTE — PROCEDURES
[facility]    Procedure: Tunneled HD cath placement    Date/Time: 5/10/2021 3:19 PM  Performed by: Raymundo Zuniga MD  Authorized by: Raymundo Zuniga MD     UNIVERSAL PROTOCOL   Site Marked: Yes  Prior Images Obtained and Reviewed:  Yes  Required items: Required blood products, implants, devices and special equipment available    Patient identity confirmed:  Verbally with patient  Patient was reevaluated immediately before administering moderate or deep sedation or anesthesia  Confirmation Checklist:  Patient's identity using two indicators, relevant allergies, procedure was appropriate and matched the consent or emergent situation and correct equipment/implants were available  Time out: Immediately prior to the procedure a time out was called    Universal Protocol: the Joint Commission Universal Protocol was followed    Preparation: Patient was prepped and draped in usual sterile fashion    ESBL (mL):  5         ANESTHESIA    Anesthesia: Local infiltration  Local Anesthetic:  Lidocaine 1% without epinephrine  Anesthetic Total (mL):  16      SEDATION    Patient Sedated: Yes    Sedation Type:  Moderate (conscious) sedation  Sedation:  Fentanyl, midazolam and see MAR for details  Vital signs: Vital signs monitored during sedation    See dictated procedure note for full details.  PROCEDURE   Patient Tolerance:  Patient tolerated the procedure well with no immediate complications  Describe Procedure: Successful placement of a 15.5 Fr 3 cm cuff to tip tunneled HD cathter by right IJV approach.  Tip at the cavo atrial junction.  Cathter ready to use.  Length of time physician/provider present for 1:1 monitoring during sedation: 15

## 2021-05-10 NOTE — PROGRESS NOTES
[facility]    Medicine Progress Note - Hospitalist Service       Date of Admission:  5/9/2021  Date of Service: 05/10/2021    Assessment & Plan       Johnny Bingham is a 45 year old male with a past medical history of hypertension and chronic kidney disease who presented to an outside hospital due to fatigue, generalized weakness, cough, and low urine output.  He is found to have acute renal failure, along with hypertensive urgency and concern for possible community-acquired pneumonia.  He was transferred here to [facility] for admission and further treatment.    Acute kidney injury  Chronic kidney disease  He was hospitalized here from 9/9/2020 to 9/13/2020 with hypertensive urgency and finding of chronic kidney disease stage V without clear etiology.  Nephrology evaluated the patient evaluated the patient at that time.  He was to follow-up as outpatient with nephrology, but has not yet done so.  Last creatinine here was 5.58.  Creatinine is now 12.9.  BUN elevated.  Electrolytes WNL.  Appears dry on exam. --CT abdomen/pelvis does not show evidence of urinary obstruction, although reportedly had large output once Miles catheter placed.   Plan:  --Nephrology consulted ->  IR for Tunneled Dialysis Catheter and HD to start today  --Avoid nephrotoxins  --Continue Miles catheter, intake and output    Hypertensive urgency  Patient was admitted in September 2020 with hypertensive urgency, with symptoms of headache and lightheadedness.  He was started on amlodipine and Coreg.  Patient states he is stopped taking any blood pressure medications at home.  He does not check his blood pressures regularly.  Now presents with blood pressures up to 236/141 on arrival to the ER.Required nicardipine drip at ER, but blood pressures have improved so this is no longer needed.  Plan:  --Labetalol IV and hydralazine IV as needed  --amlodipine 10 mg daily    Community-acquired pneumonia  Possible  infiltrate on CXR.  Patient reports productive cough.  Afebrile.  No evidence for sepsis.  Treated with cefdinir and azithromycin in ER.  CT shows patchy bibasilar airspace opacities, favor atelectasis over pneumonia per radiology.  --Continue treatment with IV ceftriaxone, and p.o. azithromycin  --Monitor for worsening signs of infection.  --Pulmonary hygiene    Cardiomegaly  Seen on imaging  --Check echocardiogram.    Physical deconditioning/generalized weakness  Likely related to renal failure with uremia, and possibly pneumonia.  --Treat the above issues, and monitor  --PT/OT to eval     Elevated ALT and alkaline phosphatase  --CMP in a.m.       Diet: Room Service  NPO per Anesthesia Guidelines for Procedure/Surgery Except for: Meds, Ice Chips    DVT Prophylaxis: Pneumatic Compression Devices  Miles Catheter: in place, indication: Strict 1-2 Hour I&O  Code Status: Full Code           Disposition Plan   Expected discharge: 2 - 3 days, recommended to prior living arrangement once renal function improved.  Entered: Michael Bautista MD 05/10/2021, 12:48 PM       The patient's care was discussed with the Bedside Nurse and Patient.    Michael Bautista MD  Hospitalist Service  [facility]  Contact information available via [facility] Paging/Directory    ______________________________________________________________________    Interval History     No acute events overnight  No CP/SOB  No fevers or chills  Still with cough, no nausea/vomiting or abdominal pain  No new complaints.     Data reviewed today: I reviewed all medications, new labs and imaging results over the last 24 hours. I personally reviewed no images or EKG's today.    Physical Exam   Vital Signs: Temp: 98.7  F (37.1  C) Temp src: Oral BP: (!) 169/112 Pulse: 73   Resp: 18 SpO2: 92 % O2 Device: None (Room air)    Weight: 117 lbs 4.8 oz    Constitutional: no apparent distress  Pulmonary: No increased work of breathing, good air exchange, clear to  auscultation bilaterally, no crackles or wheezing.  Cardiovascular: Regular rate and rhythm, normal S1 and S2, and no murmur noted.  GI: Normal bowel sounds, soft, non-distended, non-tender.   Skin/Integumen: Warm, dry, no rashes.  Neuro: Moves all 4 extremities equally with normal strength.  Coordination grossly normal.  No facial droop.  Speech seemingly normal.  Psych:  Alert and oriented to self, place, situation. Normal affect.    Data   Recent Labs   Lab 05/10/21  1102 05/10/21  0527 21  1832   WBC  --  6.2  --    HGB  --  6.2*  --    MCV  --  86  --    PLT  --  128*  --    INR 1.02  --   --    NA  --  135 136   POTASSIUM  --  4.3  --    CHLORIDE  --  106  --    CO2  --  18*  --    BUN  --  118*  --    CR  --  12.60* 12.90*   ANIONGAP  --  11  --    BEAU  --  7.2*  --    GLC  --  116*  --    ALBUMIN  --  2.7*  --    PROTTOTAL  --  6.1*  --    BILITOTAL  --  0.3  --    ALKPHOS  --  106  --    ALT  --  47  --    AST  --  12  --      Recent Results (from the past 24 hour(s))   Echocardiogram Complete    Narrative    [id number]  [id number]  [id number]  [id number]^^MCKAYLA^RAYSHAWN     [facility]  Echocardiography Laboratory  [address] [place] [address]     Name: CORNELL CHE  MRN: [id number]  : 1975  Study Date: 05/10/2021 08:50 AM  Age: 45 yrs  Gender: Male  Patient Location: [facility]  Reason For Study: Cardiomegaly  Ordering Physician: MCKAYLA SAMUEL  Referring Physician: AMY FOX  Performed By: Eva Kelly RDCS     BSA: 1.7 m2  Height: 66 in  Weight: 137 lb  HR: 74  BP: 178/116 mmHg  ______________________________________________________________________________  Procedure  Complete Portable Echo Adult.  ______________________________________________________________________________  Interpretation Summary     Left ventricular systolic function is normal.  The visual ejection fraction is estimated at 55-60%.  There is moderate concentric left ventricular  hypertrophy.  Grade II or moderate diastolic dysfunction.  Right ventricular systolic pressure is elevated, consistent with mild to  moderate pulmonary hypertension.  The study was technically difficult. There is no comparison study available.  ______________________________________________________________________________  Left Ventricle  The left ventricle is normal in size. There is moderate concentric left  ventricular hypertrophy. Left ventricular systolic function is normal. The  visual ejection fraction is estimated at 55-60%. Grade II or moderate  diastolic dysfunction. No regional wall motion abnormalities noted.     Right Ventricle  The right ventricle is normal size. The right ventricular systolic function is  normal.     Atria  The left atrium is moderately dilated. Right atrial size is normal.     Mitral Valve  There is trace to mild mitral regurgitation.     Tricuspid Valve  There is trace tricuspid regurgitation. The right ventricular systolic  pressure is approximated at 29.8 mmHg plus the right atrial pressure. IVC  diameter >2.1 cm collapsing <50% with sniff suggests a high RA pressure  estimated at 15 mmHg or greater. Right ventricular systolic pressure is  elevated, consistent with mild to moderate pulmonary hypertension.     Aortic Valve  There is mild trileaflet aortic sclerosis. There is mild (1+) aortic  regurgitation. No aortic stenosis is present.     Pulmonic Valve  The pulmonic valve is not well visualized. There is trace to mild pulmonic  valvular regurgitation. Right ventricular diastolic pressure is approximated  at 6mmHg plus the right atrial pressure.     Vessels  Mild aortic root dilatation. The ascending aorta is Borderline dilated.     Pericardium  Trivial pericardial effusion.     Rhythm  Sinus rhythm was noted.  ______________________________________________________________________________  MMode/2D Measurements & Calculations  IVSd: 1.1 cm     LVIDd: 4.9 cm  LVIDs: 3.6  cm  LVPWd: 1.6 cm  FS: 25.3 %  LV mass(C)d: 266.4 grams  LV mass(C)dI: 156.3 grams/m2  Ao root diam: 4.1 cm  LA dimension: 3.7 cm  asc Aorta Diam: 3.8 cm  LA/Ao: 0.92  LA Volume Indexed (AL/bp): 46.1 ml/m2  RWT: 0.68     Doppler Measurements & Calculations  MV E max anuj: 95.8 cm/sec  MV A max anuj: 64.6 cm/sec  MV E/A: 1.5  MV dec time: 0.18 sec  PA acc time: 0.19 sec  TR max P.8 mmHg  E/E' av.9  Lateral E/e': 17.9  Medial E/e': 15.8     ______________________________________________________________________________  Report approved by: Conrad Martins 05/10/2021 10:50 AM           Medications       sodium chloride 0.9%  250 mL Intravenous Once in dialysis     sodium chloride 0.9%  300 mL Hemodialysis Machine Once     amLODIPine  10 mg Oral Daily     azithromycin  250 mg Oral Daily     carvedilol  3.125 mg Oral BID w/meals     cefTRIAXone  1 g Intravenous Q24H     epoetin bobbi-epbx (RETACRIT) inj ESRD  5,000 Units Intravenous Once in dialysis     heparin  3 mL Intracatheter During Hemodialysis (from stock)     heparin  3 mL Intracatheter During Hemodialysis (from stock)     multivitamin RENAL  1 capsule Oral Daily     - MEDICATION INSTRUCTIONS -   Does not apply Once     sodium chloride (PF)  3 mL Intracatheter Q8H

## 2021-05-10 NOTE — PROGRESS NOTES
Pt admitted to unit- understands a little english- using jabber at bedside. A/o, denies pain. Vitals stable on RA. Up w/ st. by assist. Transferred on nicardipine from EMS, stopped, not re-started here. PRN's available BP >180.  Carnes patent, clarified need w/ MD r/t kidney function keep carnes in. tolerating renal diet.  Tele SR.  Following creatinine. Fluids started. Planing nephrology consult in the AM.

## 2021-05-10 NOTE — PROGRESS NOTES
First HD run.  TDC is working well, but there is a swelling the size of a golf ball at his exit site.    Will keep an eye on it for now.    2 H  .      HD #2 tomorrow.    Roberth Gomez MD

## 2021-05-10 NOTE — PRE-PROCEDURE
GENERAL PRE-PROCEDURE:   Procedure:  Tunneled dialysis catheter placement with intravenous moderate sedation   Date/Time:  5/10/2021 12:55 PM    Written consent obtained?: Yes    Risks and benefits: Risks, benefits and alternatives were discussed    Consent given by:  Patient  Patient states understanding of procedure being performed: Yes    Patient's understanding of procedure matches consent: Yes    Procedure consent matches procedure scheduled: Yes    Expected level of sedation:  Moderate  Appropriately NPO:  Yes  ASA Class:  Class 3- Severe systemic disease, definite functional limitations  Mallampati  :  Grade 3- soft palate visible, posterior pharyngeal wall not visible  Lungs:  Lungs clear with good breath sounds bilaterally  Heart:  Normal heart sounds and rate and systolic murmur  History & Physical reviewed:  History and physical reviewed and no updates needed  Statement of review:  I have reviewed the lab findings, diagnostic data, medications, and the plan for sedation    [facility]  used for consent. Patient very quiet.     Thanks [facility] Interventional Radiology CNP ([phone]) (phone [phone])

## 2021-05-10 NOTE — PROGRESS NOTES
Care Management Follow Up    Length of Stay (days): 1    Expected Discharge Date: 05/13/21     Concerns to be Addressed:       Patient plan of care discussed at interdisciplinary rounds: Yes    Anticipated Discharge Disposition: Outpatient Dialysis     Anticipated Discharge Services:    Anticipated Discharge DME:      Patient/family educated on Medicare website which has current facility and service quality ratings:    Education Provided on the Discharge Plan:    Patient/Family in Agreement with the Plan:      Referrals Placed by CM/SW: Financial Services  Private pay costs discussed: no    Additional Information:  Alexa ornelas [facility] called writer to inform that the [facility] in [place] would have a chair time of MWF afternoon, starting times would be 1-3:30pm. They ask for more information and wanted insurance information.They ask for Hepatitis B labs be sent tomorrow once results have come back.      Aura Perez RN

## 2021-05-10 NOTE — IR NOTE
Interventional Radiology Intra-procedural Nursing Note    Patient Name: Johnny Bingham  Medical Record Number: [id number]  Today's Date: May 10, 2021    Start Time: 1500  End of procedure time: 1515  Procedure: tunneled central venous catheter placement for dialysis  Report given to: Dialysis RN  Time pt departs:  1530  : Danny Duffy Notes:   Patient arrives to Ir [address]. Identification confirmed and consent verified. Patient was then assisted onto procedure table, positioned safely and connected to monitoring equipment. Access site right chest and internal jugular  was prepped and patient draped under sterile technique.     Versed 1mg IV  Fentanyl 50mcg IV  Heparin line flush, 3000 units each lumen    Patient tolerated procedure well. VSS. Patient alert, respirations regular and unlabored, no c/o pain at this time.   Procedure access site right internal jugular  is c/d/i. Sterile dressing applied.    Patient transferred to Dialysis in stable condition accompanied by radiology transport.      Milagros Kirkpatrick RN  Interventional Radiology

## 2021-05-11 LAB
ALBUMIN SERPL-MCNC: 2.9 G/DL (ref 3.4–5)
ANION GAP SERPL CALCULATED.3IONS-SCNC: 12 MMOL/L (ref 3–14)
BUN SERPL-MCNC: 73 MG/DL (ref 7–30)
CALCIUM SERPL-MCNC: 7.9 MG/DL (ref 8.5–10.1)
CHLORIDE SERPL-SCNC: 104 MMOL/L (ref 94–109)
CO2 SERPL-SCNC: 22 MMOL/L (ref 20–32)
CREAT SERPL-MCNC: 9.61 MG/DL (ref 0.66–1.25)
DEPRECATED CALCIDIOL+CALCIFEROL SERPL-MC: 14 UG/L (ref 20–75)
ERYTHROCYTE [DISTWIDTH] IN BLOOD BY AUTOMATED COUNT: 13.7 % (ref 10–15)
GFR SERPL CREATININE-BSD FRML MDRD: 6 ML/MIN/{1.73_M2}
GLUCOSE SERPL-MCNC: 110 MG/DL (ref 70–99)
HCT VFR BLD AUTO: 23 % (ref 40–53)
HGB BLD-MCNC: 7.3 G/DL (ref 13.3–17.7)
MCH RBC QN AUTO: 26.5 PG (ref 26.5–33)
MCHC RBC AUTO-ENTMCNC: 31.7 G/DL (ref 31.5–36.5)
MCV RBC AUTO: 84 FL (ref 78–100)
PHOSPHATE SERPL-MCNC: 6 MG/DL (ref 2.5–4.5)
PLATELET # BLD AUTO: 167 10E9/L (ref 150–450)
POTASSIUM SERPL-SCNC: 3.7 MMOL/L (ref 3.4–5.3)
PTH-INTACT SERPL-MCNC: 213 PG/ML (ref 12–64)
RBC # BLD AUTO: 2.75 10E12/L (ref 4.4–5.9)
SODIUM SERPL-SCNC: 138 MMOL/L (ref 133–144)
WBC # BLD AUTO: 7 10E9/L (ref 4–11)

## 2021-05-11 PROCEDURE — 210N000002 HC R&B HEART CARE

## 2021-05-11 PROCEDURE — 250N000011 HC RX IP 250 OP 636: Performed by: INTERNAL MEDICINE

## 2021-05-11 PROCEDURE — 250N000013 HC RX MED GY IP 250 OP 250 PS 637: Performed by: STUDENT IN AN ORGANIZED HEALTH CARE EDUCATION/TRAINING PROGRAM

## 2021-05-11 PROCEDURE — 36415 COLL VENOUS BLD VENIPUNCTURE: CPT | Performed by: INTERNAL MEDICINE

## 2021-05-11 PROCEDURE — 250N000011 HC RX IP 250 OP 636: Performed by: STUDENT IN AN ORGANIZED HEALTH CARE EDUCATION/TRAINING PROGRAM

## 2021-05-11 PROCEDURE — 90937 HEMODIALYSIS REPEATED EVAL: CPT

## 2021-05-11 PROCEDURE — 99233 SBSQ HOSP IP/OBS HIGH 50: CPT | Performed by: INTERNAL MEDICINE

## 2021-05-11 PROCEDURE — 85027 COMPLETE CBC AUTOMATED: CPT | Performed by: INTERNAL MEDICINE

## 2021-05-11 PROCEDURE — 80069 RENAL FUNCTION PANEL: CPT | Performed by: INTERNAL MEDICINE

## 2021-05-11 PROCEDURE — 82306 VITAMIN D 25 HYDROXY: CPT | Performed by: INTERNAL MEDICINE

## 2021-05-11 PROCEDURE — 83970 ASSAY OF PARATHORMONE: CPT | Performed by: INTERNAL MEDICINE

## 2021-05-11 PROCEDURE — 250N000013 HC RX MED GY IP 250 OP 250 PS 637: Performed by: INTERNAL MEDICINE

## 2021-05-11 PROCEDURE — 258N000003 HC RX IP 258 OP 636: Performed by: INTERNAL MEDICINE

## 2021-05-11 PROCEDURE — 250N000013 HC RX MED GY IP 250 OP 250 PS 637: Performed by: PHYSICIAN ASSISTANT

## 2021-05-11 PROCEDURE — 250N000011 HC RX IP 250 OP 636: Performed by: PHYSICIAN ASSISTANT

## 2021-05-11 PROCEDURE — 99233 SBSQ HOSP IP/OBS HIGH 50: CPT | Performed by: STUDENT IN AN ORGANIZED HEALTH CARE EDUCATION/TRAINING PROGRAM

## 2021-05-11 RX ORDER — ERGOCALCIFEROL 1.25 MG/1
50000 CAPSULE, LIQUID FILLED ORAL
Status: DISCONTINUED | OUTPATIENT
Start: 2021-05-11 | End: 2021-05-17 | Stop reason: HOSPADM

## 2021-05-11 RX ORDER — CARVEDILOL 6.25 MG/1
6.25 TABLET ORAL 2 TIMES DAILY WITH MEALS
Status: DISCONTINUED | OUTPATIENT
Start: 2021-05-11 | End: 2021-05-12

## 2021-05-11 RX ADMIN — Medication 1 CAPSULE: at 08:15

## 2021-05-11 RX ADMIN — AMLODIPINE BESYLATE 10 MG: 10 TABLET ORAL at 08:14

## 2021-05-11 RX ADMIN — CARVEDILOL 3.12 MG: 3.12 TABLET, FILM COATED ORAL at 08:15

## 2021-05-11 RX ADMIN — PROCHLORPERAZINE EDISYLATE 10 MG: 5 INJECTION INTRAMUSCULAR; INTRAVENOUS at 14:05

## 2021-05-11 RX ADMIN — CEFTRIAXONE SODIUM 1 G: 1 INJECTION, POWDER, FOR SOLUTION INTRAMUSCULAR; INTRAVENOUS at 22:50

## 2021-05-11 RX ADMIN — HEPARIN SODIUM 3000 UNITS: 1000 INJECTION INTRAVENOUS; SUBCUTANEOUS at 11:05

## 2021-05-11 RX ADMIN — HYDRALAZINE HYDROCHLORIDE 10 MG: 20 INJECTION INTRAMUSCULAR; INTRAVENOUS at 00:56

## 2021-05-11 RX ADMIN — HYDRALAZINE HYDROCHLORIDE 10 MG: 20 INJECTION INTRAMUSCULAR; INTRAVENOUS at 08:15

## 2021-05-11 RX ADMIN — SODIUM CHLORIDE 300 ML: 9 INJECTION, SOLUTION INTRAVENOUS at 07:45

## 2021-05-11 RX ADMIN — CARVEDILOL 6.25 MG: 6.25 TABLET, FILM COATED ORAL at 20:57

## 2021-05-11 RX ADMIN — SODIUM CHLORIDE 250 ML: 9 INJECTION, SOLUTION INTRAVENOUS at 08:35

## 2021-05-11 RX ADMIN — ONDANSETRON 4 MG: 2 INJECTION INTRAMUSCULAR; INTRAVENOUS at 09:03

## 2021-05-11 RX ADMIN — PROCHLORPERAZINE EDISYLATE 10 MG: 5 INJECTION INTRAMUSCULAR; INTRAVENOUS at 01:28

## 2021-05-11 RX ADMIN — AZITHROMYCIN MONOHYDRATE 250 MG: 250 TABLET ORAL at 08:14

## 2021-05-11 RX ADMIN — OXYCODONE HYDROCHLORIDE AND ACETAMINOPHEN 1 TABLET: 5; 325 TABLET ORAL at 08:15

## 2021-05-11 RX ADMIN — LABETALOL HYDROCHLORIDE 10 MG: 5 INJECTION, SOLUTION INTRAVENOUS at 03:14

## 2021-05-11 ASSESSMENT — MIFFLIN-ST. JEOR: SCORE: 1343.75

## 2021-05-11 ASSESSMENT — ACTIVITIES OF DAILY LIVING (ADL): ADLS_ACUITY_SCORE: 12

## 2021-05-11 NOTE — PROGRESS NOTES
Potassium   Date Value Ref Range Status   05/11/2021 3.7 3.4 - 5.3 mmol/L Final     Hemoglobin   Date Value Ref Range Status   05/11/2021 7.3 (L) 13.3 - 17.7 g/dL Final     Creatinine   Date Value Ref Range Status   05/11/2021 9.61 (H) 0.66 - 1.25 mg/dL Final     Urea Nitrogen   Date Value Ref Range Status   05/11/2021 73 (H) 7 - 30 mg/dL Final     Sodium   Date Value Ref Range Status   05/11/2021 138 133 - 144 mmol/L Final     INR   Date Value Ref Range Status   05/10/2021 1.02 0.86 - 1.14 Final       DIALYSIS PROCEDURE NOTE  Hepatitis status of previous patient on machine log was checked and verified ok to use with this patients hepatitis status.  Results for CORNELL CHE (MRN [id number]) as of 5/11/2021 07:39   Ref. Range 5/10/2021 11:02   Hep B Surface Agn Latest Ref Range: NR^Nonreactive  Nonreactive   Hepatitis B Surface Antibody Latest Ref Range: <8.00 m[IU]/mL 3.45   Hepatitis B Core Qi Latest Ref Range: NR^Nonreactive  Nonreactive     Patient dialyzed for 2.5 hrs. on a K3 bath with a net fluid removal of  2L.  A BFR of 250 ml/min was obtained via a right tunneled CVC.  The treatment plan was discussed with Dr. Gomez during the treatment.    Total heparin received during the treatment: 0 units.   Line flushed, clamped and capped with heparin 1:1000 2 mL (2000 units) per lumen    Meds  given: None   Was given Zofran for nausea by floor nurse during run.  Complications: None      Person educated: patient. Knowledge base: None. Barriers to learning: language barrier. Educated on procedure via verbal mode. Patient verbalized understanding. Pt prefers oral education style.     ICEBOAT? Timeout performed pre-treatment  I: Patient was identified using 2 identifiers  C:  Consent Signed Yes  E: Equipment preventative maintenance is current and dialysis delivery system OK to use  B: Hepatitis B Status: see above  O: Dialysis orders present and complete prior to treatment  A: Vascular access verified and assessed  prior to treatment  T: Treatment was performed at a clinically appropriate time  ?: Patient was allowed to ask questions and address concerns prior to treatment  See flowsheet in EPIC for further details and post assessment.  Machine water alarm in place and functioning. Transducer pods intact and checked every 15min.   Pt returned via bed to room 263.  Chlorine/Chloramine water system checked every 4 hours.  Outpatient Dialysis at [facility].

## 2021-05-11 NOTE — PROGRESS NOTES
Pt is alert and oriented x 4. Estonian speaking, Yuliet utilized for interpretor. VSS on RA except SBP> 180, given PRN Hydralazine x 1 and Labetalol IV x 1 . C/o headache and neck pain, given Percocet x 1 with improement. CMS intact. Pt c/o nausea and had 1 episode of emesis, given Zofran x 1 and Compazine x 1. Right tunnel cath swollen, Nephrologist is aware, slight old blood tinge around the site. Unchanged. Plan for second dialysis run today. Miles patent with adequate output. Up SBA to bathroom, had medium BM during this shift. Call light with in reach. Continue to monitor.

## 2021-05-11 NOTE — PLAN OF CARE
A&O, Hebrew speaking. Interpretor used throughout day. BP's elevated throughout day, restarted on coreg today. PRN hydralazine given x1 for SBP > 180. Pt sent to IR today for dialysis access site, KENDRICK. Site swollen, nephrology assessed site while patient in dialysis. Site unchanged. Plan for second dialysis run again tomorrow. IVF stopped. SBA. Miles in place.

## 2021-05-11 NOTE — PROGRESS NOTES
"Care Management Follow Up    Length of Stay (days): 2    Expected Discharge Date: 05/14/21     Concerns to be Addressed:       Patient plan of care discussed at interdisciplinary rounds: Yes    Anticipated Discharge Disposition: Outpatient Dialysis     Anticipated Discharge Services:    Anticipated Discharge DME:      Patient/family educated on Medicare website which has current facility and service quality ratings:    Education Provided on the Discharge Plan:    Patient/Family in Agreement with the Plan:      Referrals Placed by CM/SW: Financial Services  Private pay costs discussed: Not applicable    Additional Information:  Writer received call from Zandra inquiring about insurance for patient. Writer told Zandra that [employer] [facility] is checking with patient and assisting with insurance needs. Hopefully it will be able to be active soon with his help. Zandra faxed over \"Patient Insurability Assessment\" for patient or representative to fill out. They will take him with out insurance and will work on it too.      Aura Perez RN        "

## 2021-05-11 NOTE — PROGRESS NOTES
[facility]    Medicine Progress Note - Hospitalist Service       Date of Admission:  5/9/2021  Date of Service: 05/11/2021    Assessment & Plan       Johnny Bingham is a 45 year old male with a past medical history of hypertension and chronic kidney disease who presented to an outside hospital due to fatigue, generalized weakness, cough, and low urine output.  He is found to have acute renal failure, along with hypertensive urgency and concern for possible community-acquired pneumonia.  He was transferred here to [facility] for admission and further treatment.    Acute kidney injury  Chronic kidney disease  He was hospitalized here from 9/9/2020 to 9/13/2020 with hypertensive urgency and finding of chronic kidney disease stage V without clear etiology.  Nephrology evaluated the patient evaluated the patient at that time.  He was to follow-up as outpatient with nephrology, but has not yet done so.  Last creatinine here was 5.58.  Creatinine is now 12.9.  BUN elevated.  Electrolytes WNL.  Appears dry on exam. --CT abdomen/pelvis does not show evidence of urinary obstruction, although reportedly had large output once Miles catheter placed.   Plan:  --Nephrology following ->  IR placed Tunneled Dialysis Catheter and HD was started  --Avoid nephrotoxins  --Continue Miles catheter, intake and output    Hypertensive urgency  Patient was admitted in September 2020 with hypertensive urgency, with symptoms of headache and lightheadedness.  He was started on amlodipine and Coreg.  Patient states he is stopped taking any blood pressure medications at home.  He does not check his blood pressures regularly.  Now presents with blood pressures up to 236/141 on arrival to the ER.Required nicardipine drip at ER, but blood pressures have improved so this is no longer needed.  Plan:  --Labetalol IV and hydralazine IV as needed  --amlodipine 10 mg daily    Community-acquired pneumonia  Possible  infiltrate on CXR.  Patient reports productive cough.  Afebrile.  No evidence for sepsis.  Treated with cefdinir and azithromycin in ER.  CT shows patchy bibasilar airspace opacities, favor atelectasis over pneumonia per radiology.  --Continue treatment with IV ceftriaxone, and p.o. azithromycin  --Monitor for worsening signs of infection.  --Pulmonary hygiene    Cardiomegaly  Seen on imaging  --Echocardiogram ->   Left ventricular systolic function is normal.  The visual ejection fraction is estimated at 55-60%.  There is moderate concentric left ventricular hypertrophy.  Grade II or moderate diastolic dysfunction.    Physical deconditioning/generalized weakness  Likely related to renal failure with uremia, and possibly pneumonia.  --Treat the above issues, and monitor  --PT/OT to eval    Elevated ALT and alkaline phosphatase  --CMP in a.m.       Diet: Room Service  Combination Diet Renal Diet    DVT Prophylaxis: Pneumatic Compression Devices  Miles Catheter: in place, indication: Strict 1-2 Hour I&O  Code Status: Full Code           Disposition Plan   Expected discharge: 1-2 days, recommended to prior living arrangement once renal function improved.  Entered: Michael Bautista MD 05/11/2021, 1:15 PM       The patient's care was discussed with the Bedside Nurse and Patient.    Michael Bautista MD  Hospitalist Service  [facility]  Contact information available via [facility] Paging/Directory      Patient, family, interdisciplinary team involved in care and agrees with plan.  Total time - Greater than 35 min. More than 50% of time spent in direct patient care, care coordination, patient/caregiver counseling, and formalizing plan of care.       ______________________________________________________________________    Interval History     No acute events overnight  No CP/SOB  No fevers or chills  Cough unchanged, no nausea/vomiting or abdominal pain  No new headaches, slurred speech, weaknesss  No new  complaints.     Data reviewed today: I reviewed all medications, new labs and imaging results over the last 24 hours. I personally reviewed no images or EKG's today.    Physical Exam   Vital Signs: Temp: 98.6  F (37  C) Temp src: Axillary BP: (!) 153/93 Pulse: 83   Resp: 16 SpO2: 98 % O2 Device: None (Room air)    Weight: 115 lbs 15.39 oz    Constitutional: no apparent distress  Pulmonary:CTABL  Cardiovascular: Regular rate and rhythm, normal S1 and S2, and no murmur noted.  GI: Normal bowel sounds, soft, non-distended, non-tender.   Skin/Integumen: Warm, dry, no rashes.  Neuro: Moves all 4 extremities equally with normal strength.  Coordination grossly normal.  No facial droop.  Speech seemingly normal.  Psych:  Alert and oriented to self, place, situation. Normal affect.    Data   Recent Labs   Lab 05/11/21  0525 05/10/21  2112 05/10/21  1102 05/10/21  0527 05/09/21  1832   WBC 7.0  --   --  6.2  --    HGB 7.3* 7.4*  --  6.2*  --    MCV 84  --   --  86  --      --   --  128*  --    INR  --   --  1.02  --   --      --   --  135 136   POTASSIUM 3.7  --   --  4.3  --    CHLORIDE 104  --   --  106  --    CO2 22  --   --  18*  --    BUN 73*  --   --  118*  --    CR 9.61*  --   --  12.60* 12.90*   ANIONGAP 12  --   --  11  --    BEAU 7.9*  --   --  7.2*  --    *  --   --  116*  --    ALBUMIN 2.9*  --   --  2.7*  --    PROTTOTAL  --   --   --  6.1*  --    BILITOTAL  --   --   --  0.3  --    ALKPHOS  --   --   --  106  --    ALT  --   --   --  47  --    AST  --   --   --  12  --      Recent Results (from the past 24 hour(s))   IR CVC Tunnel Placement > 5 Yrs of Age    Narrative    DATE: 5/10/2021    PROCEDURE: TUNNELED DIALYSIS CATHETER PLACEMENT  1.  Insertion of a tunneled central venous catheter.  2.  Ultrasound guidance for vascular access. A permanent image was  stored.  3.  Fluoroscopic guidance for central venous access device placement.  4.  Moderate sedation.    INTERVENTIONAL RADIOLOGIST:  Raymundo Zuniga MD    INDICATION: End-stage renal disease. The patient presents to  Interventional Radiology for placement of a tunneled hemodialysis  catheter for moderate-term central venous access for hemodialysis.    CONSENT: The risks, benefits and alternatives of tunneled hemodialysis  catheter placement were discussed with the patient  in detail. All  questions were answered. Informed consent was given to proceed with  the procedure.    MODERATE SEDATION: Versed 1.5 mg IV; Fentanyl 75 mcg IV. During the  time out, immediately prior to the administration of medications, the  patient was reassessed for adequacy to receive conscious sedation.   Under physician supervision, Versed and fentanyl were administered for  moderate sedation. Pulse oximetry, heart rate and blood pressure were  continuously monitored by an independent trained observer. The  physician spent 17 minutes of face-to-face sedation time with the  patient.    CONTRAST: None.  ANTIBIOTICS: Ancef per floor dosing  ADDITIONAL MEDICATIONS: None.    FLUOROSCOPIC TIME: 1.1 minutes.  RADIATION DOSE: Air Kerma: 2 mGy.    COMPLICATIONS: No immediate complications.    STERILE BARRIER TECHNIQUE: Maximum sterile barrier technique was used.  Cutaneous antisepsis was performed at the operative site with  application of 2% chlorhexidine and large sterile drape. Prior to the  procedure, the  and assistant performed hand hygiene and wore  hat, mask, sterile gown, and sterile gloves during the entire  procedure.    PROCEDURE:    The Central Venous Catheter Insertion checklist was reviewed prior to  placement and followed throughout the procedure. Using local  anesthesia and real-time ultrasound guidance the right internal  jugular vein was accessed. A permanent ultrasound image was saved,  documenting patency and compressibility. A subcutaneous tunnel was  created requiring a second incision. Using this access, a 23 cm tip to  cuff 14 Fr Palindrome  dialysis catheter was advanced until the tip was  in the mid/low right atrium.  The catheter was tested and found to  flush and aspirate appropriately.    FINDINGS:  Ultrasound shows an anechoic and compressible jugular vein.      Fluoroscopic spot film at completion of study show that the tunneled  dialysis catheter tip lies in the mid/low right atrium.      Impression    IMPRESSION:    1.  Successful tunneled dialysis catheter placement.  2.  The catheter is ready for use.    HAM ANAND MD     Medications     heparin         - MEDICATION INSTRUCTIONS for Dialysis Patients -   Does not apply See Admin Instructions     amLODIPine  10 mg Oral Daily     azithromycin  250 mg Oral Daily     carvedilol  6.25 mg Oral BID w/meals     cefTRIAXone  1 g Intravenous Q24H     heparin Lock (1000 units/mL High concentration)  3 mL Intracatheter Once     heparin Lock (1000 units/mL High concentration)  3 mL Intracatheter Once     multivitamin RENAL  1 capsule Oral Daily     sodium chloride (PF)  3 mL Intracatheter Q8H     vitamin D2  50,000 Units Oral Q7 Days

## 2021-05-11 NOTE — PROGRESS NOTES
Care Management Follow Up    Length of Stay (days): 2    Expected Discharge Date: 05/14/21     Concerns to be Addressed:       Patient plan of care discussed at interdisciplinary rounds: Yes    Anticipated Discharge Disposition: Outpatient Dialysis     Anticipated Discharge Services:    Anticipated Discharge DME:      Patient/family educated on Medicare website which has current facility and service quality ratings:    Education Provided on the Discharge Plan:    Patient/Family in Agreement with the Plan:      Referrals Placed by CM/SW: Financial Services  Private pay costs discussed: insurance costs pending    Additional Information:  Writer received geovanny from Z[facility] [phone]. [phone].  Zandra requests records, I.E. negative COVID test, chest xray and Hepatitis screening . Writer faxed over all records requested to [facility] at [phone].      Aura Perez RN

## 2021-05-11 NOTE — PROGRESS NOTES
Inpatient Dialysis Progress Note            Assessment and Plan:     Johnny Bingham is a 45 year old male who was admitted on 5/9/2021.      1) CKD 5:  It has progressed over the last few years.  Uncontrolled HTN has lead to a fairly rapid decline in his GFR.  It appears that he has no primary renal disease (renal disease due to HTN), but IgA nephropathy is possible.  No reason to biopsy at this point as he would not be a candidate for immune suppression.  His kidney disease has advanced enough that he needs to start hemodialysis.      HD#1 yesterday  HD #2 today.  Placement in outpt HD is underway.      2) HTN: Severe. Not fully controlled.   He has had amlodipine 10 mg daily.  Carvedilol 3.125 mg BID.  I will increase the carvedilol.       3) Anemia:  Due to ESRD.  He needs to start AGUSTÍN. ISAT 15%.  Ferritin 472.       4) MBD:  .  Vit D 14.       5) Placement:  He will need placement in as outpt HD unit of his choice.       Plan:     HD #3 tomorrow  Ergocalciferol 50 K units weekly x 8  Increase carvedilol.  Placement in outpt HD.            Interval History:     Feels OK.  Hot and cold flashes.    Not SOB.        Dialysis Parameters:     Wt Readings from Last 4 Encounters:   05/11/21 52.6 kg (115 lb 15.4 oz)   05/09/21 52.8 kg (116 lb 4.8 oz)   09/16/20 53.1 kg (117 lb)   09/12/20 52.6 kg (116 lb)     I/O last 3 completed shifts:  In: 1127 [P.O.:630; I.V.:497]  Out: 1950 [Urine:1950]  BP Readings from Last 3 Encounters:   05/11/21 (!) 142/94   05/09/21 (!) 228/112   09/16/20 (!) 160/100       Routine, ONE TIME, Starting today For 1 Occurrences  Weight Loss (kg):   Dialysis Temp: 36.5  C  Access Device: TDC  Access Site: R IJ  Dialyzer: Revaclear  Dialysis Bath: K 3  Blood Flow Rate (mL/min): 250  Total Treatment Time (hrs): 2.5  Heparin: none         Medications and Allergies:   Reviewed in EPIC      amLODIPine  10 mg Oral Daily     azithromycin  250 mg Oral Daily     carvedilol  3.125 mg Oral BID w/meals      cefTRIAXone  1 g Intravenous Q24H     heparin  3 mL Intracatheter During Hemodialysis (from stock)     heparin  3 mL Intracatheter During Hemodialysis (from stock)     heparin Lock (1000 units/mL High concentration)  3 mL Intracatheter Once     heparin Lock (1000 units/mL High concentration)  3 mL Intracatheter Once     multivitamin RENAL  1 capsule Oral Daily     - MEDICATION INSTRUCTIONS -   Does not apply Once     sodium chloride (PF)  3 mL Intracatheter Q8H     sodium chloride 0.9%, acetaminophen, calcium carbonate, fentaNYL, flumazenil, heparin, hydrALAZINE, labetalol, lidocaine 4%, lidocaine (buffered or not buffered), melatonin, midazolam, naloxone **OR** naloxone **OR** naloxone **OR** naloxone, naloxone **OR** naloxone **OR** naloxone **OR** naloxone, ondansetron **OR** ondansetron, oxyCODONE-acetaminophen, polyethylene glycol, prochlorperazine **OR** prochlorperazine **OR** prochlorperazine, senna-docusate **OR** senna-docusate, sodium chloride (PF)   No Known Allergies           Labs:     BMP  Recent Labs   Lab 05/11/21  0525 05/10/21  0527 05/09/21  1832    135 136   POTASSIUM 3.7 4.3  --    CHLORIDE 104 106  --    BEAU 7.9* 7.2*  --    CO2 22 18*  --    BUN 73* 118*  --    CR 9.61* 12.60* 12.90*   * 116*  --      CBC  Recent Labs   Lab 05/11/21  0525 05/10/21  2112 05/10/21  0527   WBC 7.0  --  6.2   HGB 7.3* 7.4* 6.2*   HCT 23.0*  --  19.4*   MCV 84  --  86     --  128*     Lab Results   Component Value Date    AST 12 05/10/2021    ALT 47 05/10/2021    ALKPHOS 106 05/10/2021    BILITOTAL 0.3 05/10/2021            Physical Exam:   Vitals were reviewed in [facility]    Wt Readings from Last 3 Encounters:   05/11/21 52.6 kg (115 lb 15.4 oz)   05/09/21 52.8 kg (116 lb 4.8 oz)   09/16/20 53.1 kg (117 lb)       Intake/Output Summary (Last 24 hours) at 5/11/2021 1057  Last data filed at 5/11/2021 0500  Gross per 24 hour   Intake 602 ml   Output 1950 ml   Net -1348 ml       GENERAL APPEARANCE:  pleasant, no distress, a & o  HEENT:  Eyes/ears/nose grossly normal, neck supple  RESP: lungs clear to auscultation with good efforts, no crackles, rhonchi or wheezes  CV: regular rate and rhythm, normal S1 S2, no murmur, click or rub   ABDOMEN: soft, nontender, bowel sounds normal  EXTREMITIES/SKIN: no edema, no rashes or lesions     Pt seen on dialysis.  Stable run.  Good BFR.      Attestation:  I have reviewed today's vital signs, notes, medications, labs and imaging.     Roberth Gomez MD  [facility] - Nephrology  [phone]

## 2021-05-12 LAB
ALBUMIN SERPL-MCNC: 3 G/DL (ref 3.4–5)
ALP SERPL-CCNC: 100 U/L (ref 40–150)
ALT SERPL W P-5'-P-CCNC: 34 U/L (ref 0–70)
ANION GAP SERPL CALCULATED.3IONS-SCNC: 7 MMOL/L (ref 3–14)
AST SERPL W P-5'-P-CCNC: 15 U/L (ref 0–45)
BILIRUB SERPL-MCNC: 0.3 MG/DL (ref 0.2–1.3)
BUN SERPL-MCNC: 43 MG/DL (ref 7–30)
CALCIUM SERPL-MCNC: 8.4 MG/DL (ref 8.5–10.1)
CHLORIDE SERPL-SCNC: 103 MMOL/L (ref 94–109)
CO2 SERPL-SCNC: 28 MMOL/L (ref 20–32)
CREAT SERPL-MCNC: 8.08 MG/DL (ref 0.66–1.25)
ERYTHROCYTE [DISTWIDTH] IN BLOOD BY AUTOMATED COUNT: 14 % (ref 10–15)
GFR SERPL CREATININE-BSD FRML MDRD: 7 ML/MIN/{1.73_M2}
GLUCOSE SERPL-MCNC: 103 MG/DL (ref 70–99)
HCT VFR BLD AUTO: 23.2 % (ref 40–53)
HGB BLD-MCNC: 7.3 G/DL (ref 13.3–17.7)
MCH RBC QN AUTO: 27.3 PG (ref 26.5–33)
MCHC RBC AUTO-ENTMCNC: 31.5 G/DL (ref 31.5–36.5)
MCV RBC AUTO: 87 FL (ref 78–100)
PHOSPHATE SERPL-MCNC: 6.5 MG/DL (ref 2.5–4.5)
PLATELET # BLD AUTO: 179 10E9/L (ref 150–450)
POTASSIUM SERPL-SCNC: 3.9 MMOL/L (ref 3.4–5.3)
PROT SERPL-MCNC: 7 G/DL (ref 6.8–8.8)
RBC # BLD AUTO: 2.67 10E12/L (ref 4.4–5.9)
SODIUM SERPL-SCNC: 138 MMOL/L (ref 133–144)
WBC # BLD AUTO: 8.2 10E9/L (ref 4–11)

## 2021-05-12 PROCEDURE — 250N000013 HC RX MED GY IP 250 OP 250 PS 637: Performed by: STUDENT IN AN ORGANIZED HEALTH CARE EDUCATION/TRAINING PROGRAM

## 2021-05-12 PROCEDURE — 86481 TB AG RESPONSE T-CELL SUSP: CPT | Performed by: STUDENT IN AN ORGANIZED HEALTH CARE EDUCATION/TRAINING PROGRAM

## 2021-05-12 PROCEDURE — 80053 COMPREHEN METABOLIC PANEL: CPT | Performed by: STUDENT IN AN ORGANIZED HEALTH CARE EDUCATION/TRAINING PROGRAM

## 2021-05-12 PROCEDURE — 258N000003 HC RX IP 258 OP 636: Performed by: INTERNAL MEDICINE

## 2021-05-12 PROCEDURE — 85027 COMPLETE CBC AUTOMATED: CPT | Performed by: STUDENT IN AN ORGANIZED HEALTH CARE EDUCATION/TRAINING PROGRAM

## 2021-05-12 PROCEDURE — 250N000011 HC RX IP 250 OP 636: Performed by: STUDENT IN AN ORGANIZED HEALTH CARE EDUCATION/TRAINING PROGRAM

## 2021-05-12 PROCEDURE — 634N000001 HC RX 634: Performed by: INTERNAL MEDICINE

## 2021-05-12 PROCEDURE — 36415 COLL VENOUS BLD VENIPUNCTURE: CPT | Performed by: STUDENT IN AN ORGANIZED HEALTH CARE EDUCATION/TRAINING PROGRAM

## 2021-05-12 PROCEDURE — 84100 ASSAY OF PHOSPHORUS: CPT | Performed by: STUDENT IN AN ORGANIZED HEALTH CARE EDUCATION/TRAINING PROGRAM

## 2021-05-12 PROCEDURE — 250N000013 HC RX MED GY IP 250 OP 250 PS 637: Performed by: INTERNAL MEDICINE

## 2021-05-12 PROCEDURE — 210N000002 HC R&B HEART CARE

## 2021-05-12 PROCEDURE — 99233 SBSQ HOSP IP/OBS HIGH 50: CPT | Performed by: INTERNAL MEDICINE

## 2021-05-12 PROCEDURE — 99233 SBSQ HOSP IP/OBS HIGH 50: CPT | Performed by: HOSPITALIST

## 2021-05-12 PROCEDURE — 90937 HEMODIALYSIS REPEATED EVAL: CPT

## 2021-05-12 RX ORDER — CARVEDILOL 12.5 MG/1
12.5 TABLET ORAL 2 TIMES DAILY WITH MEALS
Status: DISCONTINUED | OUTPATIENT
Start: 2021-05-12 | End: 2021-05-17 | Stop reason: HOSPADM

## 2021-05-12 RX ORDER — LISINOPRIL 10 MG/1
10 TABLET ORAL DAILY
Status: DISCONTINUED | OUTPATIENT
Start: 2021-05-12 | End: 2021-05-13

## 2021-05-12 RX ADMIN — AZITHROMYCIN MONOHYDRATE 250 MG: 250 TABLET ORAL at 11:51

## 2021-05-12 RX ADMIN — SODIUM CHLORIDE 250 ML: 9 INJECTION, SOLUTION INTRAVENOUS at 07:53

## 2021-05-12 RX ADMIN — SODIUM CHLORIDE 300 ML: 9 INJECTION, SOLUTION INTRAVENOUS at 07:10

## 2021-05-12 RX ADMIN — EPOETIN ALFA-EPBX 5000 UNITS: 3000 INJECTION, SOLUTION INTRAVENOUS; SUBCUTANEOUS at 09:29

## 2021-05-12 RX ADMIN — CARVEDILOL 12.5 MG: 12.5 TABLET, FILM COATED ORAL at 20:21

## 2021-05-12 RX ADMIN — CARVEDILOL 12.5 MG: 12.5 TABLET, FILM COATED ORAL at 11:43

## 2021-05-12 RX ADMIN — CEFTRIAXONE SODIUM 1 G: 1 INJECTION, POWDER, FOR SOLUTION INTRAMUSCULAR; INTRAVENOUS at 23:05

## 2021-05-12 RX ADMIN — Medication 1 CAPSULE: at 11:43

## 2021-05-12 RX ADMIN — AMLODIPINE BESYLATE 10 MG: 10 TABLET ORAL at 11:44

## 2021-05-12 RX ADMIN — LISINOPRIL 10 MG: 10 TABLET ORAL at 15:15

## 2021-05-12 ASSESSMENT — MIFFLIN-ST. JEOR: SCORE: 1321.24

## 2021-05-12 ASSESSMENT — ACTIVITIES OF DAILY LIVING (ADL)
ADLS_ACUITY_SCORE: 12
ADLS_ACUITY_SCORE: 10
ADLS_ACUITY_SCORE: 12
ADLS_ACUITY_SCORE: 10

## 2021-05-12 NOTE — PLAN OF CARE
BP elevated at HS. Back in parameters with scheduled meds. Temp mild elevation at 100.0 this AM. Patient denies pain. Greenlandic speaking.  used for assessments. Up ad judson in room. Miles catheter in place. Plan for HD today.

## 2021-05-12 NOTE — PROGRESS NOTES
"Care Management Follow Up    Length of Stay (days): 3    Expected Discharge Date: 05/14/21     Concerns to be Addressed:       Patient plan of care discussed at interdisciplinary rounds: Yes    Anticipated Discharge Disposition: Outpatient Dialysis     Anticipated Discharge Services:    Anticipated Discharge DME:      Patient/family educated on Medicare website which has current facility and service quality ratings:    Education Provided on the Discharge Plan:    Patient/Family in Agreement with the Plan:      Referrals Placed by CM/SW: Financial Services  Private pay costs discussed: Not applicable    Additional Information:  Writer and patient(via  [employer]) filled out  \"Patient Insurability Form\" for admission to [facility]. Dr. Gomez spoke to [facility] where patient might get a chair time. They received all the information via fax from writer. Writer did tell patient he would still need to do his part of [place] [facility] but we would help. Patient given location of Dialysis center on a piece of paper.      Aura Perez RN        "

## 2021-05-12 NOTE — PROGRESS NOTES
Inpatient Dialysis Progress Note            Assessment and Plan:     Johnny Bingham is a 45 year old male who was admitted on 5/9/2021.      1) CKD 5:  It has progressed over the last few years.  Uncontrolled HTN has lead to a fairly rapid decline in his GFR.  It appears that he has no primary renal disease (renal disease due to HTN), but IgA nephropathy is possible.  No reason to biopsy at this point as he would not be a candidate for immune suppression.  His kidney disease has advanced enough that he needs to start hemodialysis.       HD#1 5/10/21  HD #2 5/11/21  HD#3 today.   Placement in outpt HD is underway.       2) HTN: Severe. Not fully controlled.   He has had amlodipine 10 mg daily.  Carvedilol 6.25 mg BID.  I will increase the carvedilol again and add lisinopril 10 mg.       3) Anemia:  Due to ESRD. EPO 5000 units.  ISAT 15%.  Ferritin 472.       4) MBD:  .  Vit D 14.  Oin ergocalciferol.       5) Placement:  He will need placement in as outpt HD unit of his choice.       Plan:     HD #4 Friday   Increase carvedilol to 12.5 mg BID  Add lisinopril 10 mg daily  Placement in outpt HD.   Miles out.             Interval History:     Feeling OK.  No complaints.  Miles still in.          Dialysis Parameters:     Wt Readings from Last 4 Encounters:   05/12/21 50.3 kg (111 lb)   05/09/21 52.8 kg (116 lb 4.8 oz)   09/16/20 53.1 kg (117 lb)   09/12/20 52.6 kg (116 lb)     I/O last 3 completed shifts:  In: 480 [P.O.:480]  Out: 3250 [Urine:1250; Other:2000]  BP Readings from Last 3 Encounters:   05/12/21 (!) 161/111   05/09/21 (!) 228/112   09/16/20 (!) 160/100       Routine, ONE TIME, Starting today For 1 Occurrences  Weight Loss (kg): 1-2  Dialysis Temp: 36.5  C  Access Device: CVC  Access Site: R IJ  Dialyzer: Revaclear  Dialysis Bath: K 3  Blood Flow Rate (mL/min): 300  Total Treatment Time (hrs): 3  Heparin: no         Medications and Allergies:   Reviewed in EPIC      - MEDICATION INSTRUCTIONS for  Dialysis Patients -   Does not apply See Admin Instructions     amLODIPine  10 mg Oral Daily     azithromycin  250 mg Oral Daily     carvedilol  12.5 mg Oral BID w/meals     cefTRIAXone  1 g Intravenous Q24H     heparin Lock (1000 units/mL High concentration)  3 mL Intracatheter Once     heparin Lock (1000 units/mL High concentration)  3 mL Intracatheter Once     lisinopril  10 mg Oral Daily     multivitamin RENAL  1 capsule Oral Daily     - MEDICATION INSTRUCTIONS -   Does not apply Once     sodium chloride (PF)  3 mL Intracatheter Q8H     vitamin D2  50,000 Units Oral Q7 Days     sodium chloride 0.9%, acetaminophen, calcium carbonate, fentaNYL, flumazenil, heparin, hydrALAZINE, labetalol, lidocaine 4%, lidocaine (buffered or not buffered), melatonin, midazolam, naloxone **OR** [DISCONTINUED] naloxone **OR** naloxone **OR** [DISCONTINUED] naloxone, [DISCONTINUED] naloxone **OR** naloxone **OR** [DISCONTINUED] naloxone **OR** naloxone, ondansetron **OR** ondansetron, oxyCODONE-acetaminophen, polyethylene glycol, prochlorperazine **OR** prochlorperazine **OR** prochlorperazine, senna-docusate **OR** senna-docusate, sodium chloride (PF)   No Known Allergies           Labs:     BMP  Recent Labs   Lab 05/12/21  0607 05/11/21  0525 05/10/21  0527 05/09/21  1832    138 135 136   POTASSIUM 3.9 3.7 4.3  --    CHLORIDE 103 104 106  --    BEAU 8.4* 7.9* 7.2*  --    CO2 28 22 18*  --    BUN 43* 73* 118*  --    CR 8.08* 9.61* 12.60* 12.90*   * 110* 116*  --      CBC  Recent Labs   Lab 05/12/21  0607 05/11/21  0525 05/10/21  2112 05/10/21  0527   WBC 8.2 7.0  --  6.2   HGB 7.3* 7.3* 7.4* 6.2*   HCT 23.2* 23.0*  --  19.4*   MCV 87 84  --  86    167  --  128*     Lab Results   Component Value Date    AST 15 05/12/2021    ALT 34 05/12/2021    ALKPHOS 100 05/12/2021    BILITOTAL 0.3 05/12/2021            Physical Exam:   Vitals were reviewed in [facility]    Wt Readings from Last 3 Encounters:   05/12/21 50.3 kg (111  lb)   05/09/21 52.8 kg (116 lb 4.8 oz)   09/16/20 53.1 kg (117 lb)       Intake/Output Summary (Last 24 hours) at 5/12/2021 1006  Last data filed at 5/12/2021 0615  Gross per 24 hour   Intake 480 ml   Output 3250 ml   Net -2770 ml       GENERAL APPEARANCE: pleasant, no distress  HEENT:  Eyes/ears/nose grossly normal, neck supple  RESP: lungs clear to auscultation with good efforts, no crackles, rhonchi or wheezes  CV: regular rate and rhythm, normal S1 S2, no murmur, click or rub   ABDOMEN: soft, nontender, bowel sounds normal  EXTREMITIES/SKIN: no edema, no rashes or lesions     Pt seen on dialysis.  Good BFR.      Attestation:  I have reviewed today's vital signs, notes, medications, labs and imaging.     Roberth Gomez MD  [facility] - Nephrology  [phone]

## 2021-05-12 NOTE — PROGRESS NOTES
[facility]    Medicine Progress Note - Hospitalist Service       Date of Admission:  5/9/2021  Assessment & Plan       Johnny Bingham is a 45 year old male with a past medical history of hypertension and chronic kidney disease who presented to an outside hospital due to fatigue, generalized weakness, cough, and low urine output.  He is found to have acute renal failure, along with hypertensive urgency and concern for possible community-acquired pneumonia.  He was transferred here to [facility] for admission and further treatment.     Acute kidney injury  Chronic kidney disease  He was hospitalized here from 9/9/2020 to 9/13/2020 with hypertensive urgency and finding of chronic kidney disease stage V without clear etiology.  Nephrology evaluated the patient evaluated the patient at that time.  He was to follow-up as outpatient with nephrology, but has not yet done so.  Last creatinine here was 5.58.  Creatinine is now 12.9.  BUN elevated.  Electrolytes WNL.  Appears dry on exam. -- CT abdomen/pelvis does not show evidence of urinary obstruction, although reportedly had large output once Miles catheter placed.   Plan:  - Nephrology following -->  IR placed Tunneled Dialysis Catheter and HD was started  - Avoid nephrotoxins  - Continue Miles catheter, intake and output     Hypertensive urgency  Patient was admitted in September 2020 with hypertensive urgency, with symptoms of headache and lightheadedness.  He was started on amlodipine and Coreg.  Patient states he is stopped taking any blood pressure medications at home.  He does not check his blood pressures regularly.  Now presents with blood pressures up to 236/141 on arrival to the ER. Required nicardipine drip at ER, but blood pressures have improved so this is no longer needed.  Plan:  - Labetalol IV and hydralazine IV as needed  - Amlodipine 10 mg daily, carvedilol 6.25 mg BID now up to 12.5 mg BID (5/12)  - Lisinopril  10 mg daily started 5/12  - appreciate Nephrology management     Community-acquired pneumonia, possibly  Possible infiltrate on CXR.  Patient reports productive cough.  Afebrile.  No evidence for sepsis.  Treated with cefdinir and azithromycin in ER.  CT shows patchy bibasilar airspace opacities, favor atelectasis over pneumonia per radiology.  - Continue treatment with IV ceftriaxone, and p.o. azithromycin -- started 5/9/2021  - Monitor for worsening signs of infection.  - Pulmonary hygiene     Cardiomegaly  Seen on imaging  - Echocardiogram -> LVSF normal, EF 55-60%, mod concLVH, grade II or mod diastolic dysfunction    Anemia of chronic disease (ESRD)  Iron sat 15%, Ferritin 472. Hgb 9 or so.  On adm 6.2 hgb.   - nephro managing - EPO given.   - 5/12 7.3, relatively stable     Physical deconditioning/generalized weakness  Likely related to renal failure with uremia, and possibly pneumonia.  - Treat the above issues, and monitor  - PT/OT to eval       Diet: Room Service  Combination Diet Renal Diet    DVT Prophylaxis: Pneumatic Compression Devices  Miles Catheter: in place, indication: Strict 1-2 Hour I&O  Code Status: Full Code           Disposition Plan   Expected discharge: pending nephro plans - will need outpatient HD arranged  Entered: Santiago Parks MD 05/12/2021, 8:56 AM       The patient's care was discussed with the Bedside Nurse and Patient.    Santiago Parks MD  Hospitalist Service  [facility]  Contact information available via [facility] Paging/Directory    ______________________________________________________________________    Interval History   Patient seen and examined this morning. Jabber used for interpreting. Denies pain, fevers, chills, SOB, or cough. HD today. Continue plan with nephrology - HD and HTN improvement.  Outpatient HD being arranged.    Data reviewed today: I reviewed all medications, new labs and imaging results over the last 24 hours. I personally  reviewed no images or EKG's today.    Physical Exam   Vital Signs: Temp: 98.9  F (37.2  C) Temp src: Axillary BP: (!) 143/101 Pulse: 72   Resp: 16 SpO2: 98 % O2 Device: None (Room air)    Weight: 111 lbs 0 oz      Gen: NAD, pleasant, Polish speaking - jabber  used  HEENT: Normocephalic, EOMI, MMM  Resp: no crackles,  no wheezes, no increased work of resp  CV: S1S2 heard, reg rhythm, reg rate  Abdo: soft, nontender, nondistended, bowel sounds present  Ext: calves nontender, well perfused  Neuro: AAOx3, CN grossly intact, no facial asymmetry      Data   Recent Labs   Lab 05/12/21  0607 05/11/21  0525 05/10/21  2112 05/10/21  1102 05/10/21  0527   WBC 8.2 7.0  --   --  6.2   HGB 7.3* 7.3* 7.4*  --  6.2*   MCV 87 84  --   --  86    167  --   --  128*   INR  --   --   --  1.02  --     138  --   --  135   POTASSIUM 3.9 3.7  --   --  4.3   CHLORIDE 103 104  --   --  106   CO2 28 22  --   --  18*   BUN 43* 73*  --   --  118*   CR 8.08* 9.61*  --   --  12.60*   ANIONGAP 7 12  --   --  11   BEAU 8.4* 7.9*  --   --  7.2*   * 110*  --   --  116*   ALBUMIN 3.0* 2.9*  --   --  2.7*   PROTTOTAL 7.0  --   --   --  6.1*   BILITOTAL 0.3  --   --   --  0.3   ALKPHOS 100  --   --   --  106   ALT 34  --   --   --  47   AST 15  --   --   --  12     No results found for this or any previous visit (from the past 24 hour(s)).

## 2021-05-12 NOTE — PLAN OF CARE
VSS. Tele SR/ST. HD run today, tolerated well. Poor appetite. Adequate UO. Transfer to [facility].

## 2021-05-12 NOTE — PLAN OF CARE
A+O x 4. Serbian speaking. Understands minimal english. Jabber in room. Having acute renal failure. Hemo-Dialysis today. HD cath on right sub-clavicular. Care coordinators are setting up outpatient HD. Needs to stay until 5/14/21. Renal diet. Full code. Nephrology following. Independent. Jd taken out today.

## 2021-05-12 NOTE — PROGRESS NOTES
Potassium   Date Value Ref Range Status   05/12/2021 3.9 3.4 - 5.3 mmol/L Final     Hemoglobin   Date Value Ref Range Status   05/12/2021 7.3 (L) 13.3 - 17.7 g/dL Final     Creatinine   Date Value Ref Range Status   05/12/2021 8.08 (H) 0.66 - 1.25 mg/dL Final     Urea Nitrogen   Date Value Ref Range Status   05/12/2021 43 (H) 7 - 30 mg/dL Final     Sodium   Date Value Ref Range Status   05/12/2021 138 133 - 144 mmol/L Final     INR   Date Value Ref Range Status   05/10/2021 1.02 0.86 - 1.14 Final       DIALYSIS PROCEDURE NOTE  Hepatitis status of previous patient on machine log was checked and verified ok to use with this patients hepatitis status.  Results for CORNELL CHE (MRN [id number]) as of 5/12/2021 06:52   Ref. Range 5/10/2021 11:02   Hep B Surface Agn Latest Ref Range: NR^Nonreactive  Nonreactive   Hepatitis B Surface Antibody Latest Ref Range: <8.00 m[IU]/mL 3.45   Hepatitis B Core Qi Latest Ref Range: NR^Nonreactive  Nonreactive     Patient dialyzed for 3 hrs. on a K3 bath with a net fluid removal of  2L.  A BFR of 300 ml/min was obtained via a right tunneled CVC.      The treatment plan was discussed with Dr. Gomez during the treatment.    Total heparin received during the treatment: 0 units.   Line flushed, clamped and capped with heparin 1:1000 1.9 mL (1900 units) per lumen    Meds  given: epogen 5,000 units   Complications: None      Person educated: patient. Knowledge base minimal. Barriers to learning: language. Educated on procedure via verbal mode. Patient verbalized understanding but requires follow up. Pt prefers oral education style.     ICEBOAT? Timeout performed pre-treatment  I: Patient was identified using 2 identifiers  C:  Consent Signed Yes  E: Equipment preventative maintenance is current and dialysis delivery system OK to use  B: Hepatitis B Status: see above  O: Dialysis orders present and complete prior to treatment  A: Vascular access verified and assessed prior to  treatment  T: Treatment was performed at a clinically appropriate time  ?: Patient was allowed to ask questions and address concerns prior to treatment  See flowsheet in EPIC for further details and post assessment.  Machine water alarm in place and functioning. Transducer pods intact and checked every 15min.   Pt returned via bed to room 251-1.  Chlorine/Chloramine water system checked every 4 hours.  Outpatient Dialysis at [facility].

## 2021-05-13 ENCOUNTER — APPOINTMENT (OUTPATIENT)
Dept: GENERAL RADIOLOGY | Facility: CLINIC | Age: 46
DRG: 673 | End: 2021-05-13
Attending: HOSPITALIST

## 2021-05-13 LAB
ANION GAP SERPL CALCULATED.3IONS-SCNC: 8 MMOL/L (ref 3–14)
BUN SERPL-MCNC: 31 MG/DL (ref 7–30)
CALCIUM SERPL-MCNC: 7.9 MG/DL (ref 8.5–10.1)
CHLORIDE SERPL-SCNC: 100 MMOL/L (ref 94–109)
CO2 SERPL-SCNC: 27 MMOL/L (ref 20–32)
CREAT SERPL-MCNC: 7.07 MG/DL (ref 0.66–1.25)
ERYTHROCYTE [DISTWIDTH] IN BLOOD BY AUTOMATED COUNT: 13.9 % (ref 10–15)
GAMMA INTERFERON BACKGROUND BLD IA-ACNC: 0.02 IU/ML
GFR SERPL CREATININE-BSD FRML MDRD: 8 ML/MIN/{1.73_M2}
GLUCOSE SERPL-MCNC: 92 MG/DL (ref 70–99)
HCT VFR BLD AUTO: 24.3 % (ref 40–53)
HGB BLD-MCNC: 7.6 G/DL (ref 13.3–17.7)
M TB IFN-G CD4+ BCKGRND COR BLD-ACNC: 9.98 IU/ML
M TB TUBERC IFN-G BLD QL: POSITIVE
MCH RBC QN AUTO: 27.7 PG (ref 26.5–33)
MCHC RBC AUTO-ENTMCNC: 31.3 G/DL (ref 31.5–36.5)
MCV RBC AUTO: 89 FL (ref 78–100)
MITOGEN IGNF BCKGRD COR BLD-ACNC: 0.09 IU/ML
MITOGEN IGNF BCKGRD COR BLD-ACNC: 0.36 IU/ML
PLATELET # BLD AUTO: 200 10E9/L (ref 150–450)
POTASSIUM SERPL-SCNC: 3.7 MMOL/L (ref 3.4–5.3)
RBC # BLD AUTO: 2.74 10E12/L (ref 4.4–5.9)
SODIUM SERPL-SCNC: 135 MMOL/L (ref 133–144)
WBC # BLD AUTO: 8.1 10E9/L (ref 4–11)

## 2021-05-13 PROCEDURE — 250N000013 HC RX MED GY IP 250 OP 250 PS 637: Performed by: STUDENT IN AN ORGANIZED HEALTH CARE EDUCATION/TRAINING PROGRAM

## 2021-05-13 PROCEDURE — 71046 X-RAY EXAM CHEST 2 VIEWS: CPT

## 2021-05-13 PROCEDURE — 99232 SBSQ HOSP IP/OBS MODERATE 35: CPT | Performed by: HOSPITALIST

## 2021-05-13 PROCEDURE — 85027 COMPLETE CBC AUTOMATED: CPT | Performed by: HOSPITALIST

## 2021-05-13 PROCEDURE — 80048 BASIC METABOLIC PNL TOTAL CA: CPT | Performed by: HOSPITALIST

## 2021-05-13 PROCEDURE — 120N000001 HC R&B MED SURG/OB

## 2021-05-13 PROCEDURE — 250N000013 HC RX MED GY IP 250 OP 250 PS 637: Performed by: INTERNAL MEDICINE

## 2021-05-13 PROCEDURE — 250N000011 HC RX IP 250 OP 636: Performed by: HOSPITALIST

## 2021-05-13 PROCEDURE — 36415 COLL VENOUS BLD VENIPUNCTURE: CPT | Performed by: HOSPITALIST

## 2021-05-13 RX ORDER — LISINOPRIL 20 MG/1
20 TABLET ORAL DAILY
Status: DISCONTINUED | OUTPATIENT
Start: 2021-05-14 | End: 2021-05-17 | Stop reason: HOSPADM

## 2021-05-13 RX ORDER — LISINOPRIL 10 MG/1
10 TABLET ORAL ONCE
Status: COMPLETED | OUTPATIENT
Start: 2021-05-13 | End: 2021-05-13

## 2021-05-13 RX ADMIN — AZITHROMYCIN MONOHYDRATE 250 MG: 250 TABLET ORAL at 10:19

## 2021-05-13 RX ADMIN — LISINOPRIL 10 MG: 10 TABLET ORAL at 21:31

## 2021-05-13 RX ADMIN — CARVEDILOL 12.5 MG: 12.5 TABLET, FILM COATED ORAL at 10:19

## 2021-05-13 RX ADMIN — CARVEDILOL 12.5 MG: 12.5 TABLET, FILM COATED ORAL at 21:32

## 2021-05-13 RX ADMIN — CEFTRIAXONE SODIUM 1 G: 1 INJECTION, POWDER, FOR SOLUTION INTRAMUSCULAR; INTRAVENOUS at 21:33

## 2021-05-13 RX ADMIN — SENNOSIDES AND DOCUSATE SODIUM 2 TABLET: 8.6; 5 TABLET ORAL at 10:22

## 2021-05-13 RX ADMIN — Medication 1 CAPSULE: at 10:19

## 2021-05-13 RX ADMIN — LISINOPRIL 10 MG: 10 TABLET ORAL at 10:19

## 2021-05-13 RX ADMIN — AMLODIPINE BESYLATE 10 MG: 10 TABLET ORAL at 10:19

## 2021-05-13 ASSESSMENT — ACTIVITIES OF DAILY LIVING (ADL)
ADLS_ACUITY_SCORE: 10
ADLS_ACUITY_SCORE: 11
ADLS_ACUITY_SCORE: 10
ADLS_ACUITY_SCORE: 10

## 2021-05-13 ASSESSMENT — MIFFLIN-ST. JEOR: SCORE: 1313.08

## 2021-05-13 NOTE — PLAN OF CARE
VSS. Denies pain. Up independently in the room. Getting ruled out for TB and transferring to station 66

## 2021-05-13 NOTE — PROGRESS NOTES
[facility]    Medicine Progress Note - Hospitalist Service       Date of Admission:  5/9/2021  Assessment & Plan       Johnny Bingham is a 45 year old male with a past medical history of hypertension and chronic kidney disease who presented to an outside hospital due to fatigue, generalized weakness, cough, and low urine output.  He is found to have acute renal failure, along with hypertensive urgency and concern for possible community-acquired pneumonia.  He was transferred here to [facility] for admission and further treatment.     Acute kidney injury  Chronic kidney disease  He was hospitalized here from 9/9/2020 to 9/13/2020 with hypertensive urgency and finding of chronic kidney disease stage V without clear etiology.  Nephrology evaluated the patient evaluated the patient at that time.  He was to follow-up as outpatient with nephrology, but has not yet done so.  Last creatinine here was 5.58.  Creatinine is now 12.9.  BUN elevated.  Electrolytes WNL.  Appears dry on exam. CT abdomen/pelvis does not show evidence of urinary obstruction, although reportedly had large output once Carnes catheter placed.   Plan:  - continue HD via right tunneled cath  - Avoid nephrotoxins  - carnes out  - counseled in depth with  via marvin     Hypertensive urgency  Patient was admitted in September 2020 with hypertensive urgency, with symptoms of headache and lightheadedness.  He was started on amlodipine and Coreg.  Patient states he is stopped taking any blood pressure medications at home.  He does not check his blood pressures regularly.  Now presents with blood pressures up to 236/141 on arrival to the ER. Required nicardipine drip at ER, but blood pressures have improved so this is no longer needed.  Plan:  - Labetalol IV and hydralazine IV as needed  - Amlodipine 10 mg daily, carvedilol 6.25 mg BID now up to 12.5 mg BID (5/12)  - Lisinopril 10 mg daily started 5/12  -  appreciate Nephrology management, defer to them unless I am told otherwise     Community-acquired pneumonia, possibly  Possible infiltrate on CXR.  Patient reports productive cough.  Afebrile.  No evidence for sepsis.  Treated with cefdinir and azithromycin in ER.  CT shows patchy bibasilar airspace opacities, favor atelectasis over pneumonia per radiology.  - complete 5 days for CAP  - Monitor for worsening signs of infection.  - Pulmonary hygiene     Cardiomegaly  Seen on imaging  - Echocardiogram -> LVSF normal, EF 55-60%, mod concLVH, grade II or mod diastolic dysfunction    Anemia of chronic disease (ESRD)  Iron sat 15%, Ferritin 472. Hgb 9 or so.  On adm 6.2 hgb.   - nephro managing - EPO given.      Physical deconditioning/generalized weakness  Likely related to renal failure with uremia, and possibly pneumonia.  - Treat the above issues, and monitor  - PT/OT to eval       Diet: Room Service  Combination Diet Renal Diet    DVT Prophylaxis: Pneumatic Compression Devices  Miles Catheter: not present  Code Status: Full Code           Disposition Plan   Expected discharge: pending nephro plans - will need outpatient HD arranged  Entered: Octavio Quinn DO 05/13/2021, 10:14 AM       The patient's care was discussed with the Bedside Nurse and Patient.    Octavio Quinn DO  Hospitalist Service  [facility]  Contact information available via [facility] Paging/Directory    ______________________________________________________________________    Interval History   Patient seen and examined this morning. Jacquelyn used for interpreting, discussed need for HD to prolong life.     Data reviewed today: I reviewed all medications, new labs and imaging results over the last 24 hours. I personally reviewed no images or EKG's today.    Physical Exam   Vital Signs: Temp: 98  F (36.7  C) Temp src: Oral BP: (!) 160/104 Pulse: 68   Resp: 16 SpO2: 99 % O2 Device: None (Room air)    Weight: 109 lbs 3.2  oz      Gen: NAD, pleasant, Yakut speaking - jabber  used  HEENT: Normocephalic, EOMI, MMM  Resp: no crackles,  no wheezes, no increased work of resp  CV: S1S2 heard, reg rhythm, reg rate  Abdo: soft, nontender, nondistended, bowel sounds present  Ext: calves nontender, well perfused  Neuro: AAOx3, CN grossly intact, no facial asymmetry      Data   Recent Labs   Lab 05/13/21  0626 05/12/21  0607 05/11/21  0525 05/10/21  1102 05/10/21  1102 05/10/21  0527   WBC 8.1 8.2 7.0  --   --  6.2   HGB 7.6* 7.3* 7.3*   < >  --  6.2*   MCV 89 87 84  --   --  86    179 167  --   --  128*   INR  --   --   --   --  1.02  --     138 138  --   --  135   POTASSIUM 3.7 3.9 3.7  --   --  4.3   CHLORIDE 100 103 104  --   --  106   CO2 27 28 22  --   --  18*   BUN 31* 43* 73*  --   --  118*   CR 7.07* 8.08* 9.61*  --   --  12.60*   ANIONGAP 8 7 12  --   --  11   BEAU 7.9* 8.4* 7.9*  --   --  7.2*   GLC 92 103* 110*  --   --  116*   ALBUMIN  --  3.0* 2.9*  --   --  2.7*   PROTTOTAL  --  7.0  --   --   --  6.1*   BILITOTAL  --  0.3  --   --   --  0.3   ALKPHOS  --  100  --   --   --  106   ALT  --  34  --   --   --  47   AST  --  15  --   --   --  12    < > = values in this interval not displayed.     No results found for this or any previous visit (from the past 24 hour(s)).

## 2021-05-13 NOTE — PROGRESS NOTES
Called for covid testing from nursing  He was negative on 5/9 at [facility], given lack of clinical symptoms of respiratory infection will hold off at this time of repeat covid testinng  quantiferon positive, but no major respiratory symptoms  CXR at outside hospital notable for possible pneumonia and has been on antibiotics for CAP.  ID consult pending  Will repeat a CXR  D/w Dr. Gomez

## 2021-05-13 NOTE — PROGRESS NOTES
Care Management Follow Up    Additional Information:  Contacted Zandra with [facility] admissions regarding outpatient dialysis and Zandra confirmed that currently patient is set for the following schedule: [id number], 2nd shift.  Zandra requesting copy of patient's Green Card and Quantiferon Gold lab test.  Paged MD to order lab and will contact patient regarding getting green card.    4:20 PM  Rec'd green card info from patient's family and securely emailed to Zandra at [facility] admissions.  Awaiting ID consult regarding positive Quantiferon lab results.    Length of Stay (days): 4    Expected Discharge Date: 05/16/21     Concerns to be Addressed:       Patient plan of care discussed at interdisciplinary rounds: Yes    Anticipated Discharge Disposition: Outpatient Dialysis     Anticipated Discharge Services:    Anticipated Discharge DME:      Patient/family educated on Medicare website which has current facility and service quality ratings:    Education Provided on the Discharge Plan:    Patient/Family in Agreement with the Plan:      Referrals Placed by CM/SW: Financial Services  Private pay costs discussed: Not applicable    Zeny Isaac RN  Care Coordinator  [facility]  [phone] (text or call)

## 2021-05-13 NOTE — CONSULTS
[facility]    Infectious Disease Consultation     Date of Admission:  5/9/2021  Date of Consult (When I saw the patient): 05/13/21    Assessment & Plan   Johnny Bingham is a 45 year old male who was admitted on 5/9/2021.     Impression:  1. 45 y.o male with HTN, CKD.   2. Admitted with DWAYNE.   3. As part of initiating dialysis a quantiferon gold was sent which came back pos.   4. Patient immigrated from [place] 18 year ago. Thinks but was PPD negative, but not sure.   5. When admitted did have some cough, right now now cough.   6. Denies weight loss, night sweats, but appears to be chronically ill.   7. No recent CT chest in the chart though HPI does mention a CT chest.     Recommendations:   Get the [facility] CT chest if done, if not done recently ( last few weeks ) get a CT chest.   If CT chest positive for active TB will recommend sputum for AFB, can be induced if high suspicion on the CT chest and patient unable to produce any.   Airborne isolation for now till more information is obtained.       Nani Stewart MD    Reason for Consult   Reason for consult: I was asked to evaluate this patient for ruling out TB .    Primary Care Physician   Physician No Ref-Primary    Chief Complaint   DWAYNE     History is obtained from the patient and medical records    History of Present Illness   Johnny Bingham is a 45 year old male with a past medical history of hypertension and chronic kidney disease who presented to an outside hospital due to fatigue, generalized weakness, cough, and low urine output.  He is found to have acute renal failure, along with hypertensive urgency and concern for possible community-acquired pneumonia.  He was transferred here to [facility] for admission and further treatment.    Past Medical History   I have reviewed this patient's medical history and updated it with pertinent information if needed.   Past Medical History:   Diagnosis Date     CKD (chronic kidney  disease) stage 5, GFR less than 15 ml/min (H)      Hypertension        Past Surgical History   I have reviewed this patient's surgical history and updated it with pertinent information if needed.  Past Surgical History:   Procedure Laterality Date     IR CVC TUNNEL PLACEMENT > 5 YRS OF AGE  5/10/2021     NO HISTORY OF SURGERY         Prior to Admission Medications   Prior to Admission Medications   Prescriptions Last Dose Informant Patient Reported? Taking?   Pseudoeph-Doxylamine-DM-APAP (DAYQUIL/NYQUIL COLD/FLU RELIEF OR) 5/9/2021 at Unknown time  Yes Yes   Sig: Take 30 mLs by mouth every 4 hours as needed (Cold/Flu symptoms)       Facility-Administered Medications: None     Allergies   No Known Allergies    Immunization History   Immunization History   Administered Date(s) Administered     COVID-19,PF,Pfizer 04/03/2021, 04/24/2021       Social History   I have reviewed this patient's social history and updated it with pertinent information if needed. Johnny Bingham  reports that he has quit smoking. He has never used smokeless tobacco. He reports that he does not drink alcohol or use drugs.    Family History   I have reviewed this patient's family history and updated it with pertinent information if needed.   Family History   Problem Relation Age of Onset     Heart Disease Mother      Kidney Disease Father      Heart Disease Father      Diabetes Brother      Colon Cancer No family hx of      Prostate Cancer No family hx of        Review of Systems   The 10 point Review of Systems is negative other than noted in the HPI or here.     Physical Exam   Temp: 98  F (36.7  C) Temp src: Oral BP: (!) 145/101 Pulse: 65   Resp: 16 SpO2: 99 % O2 Device: None (Room air)    Vital Signs with Ranges  Temp:  [98  F (36.7  C)-100.2  F (37.9  C)] 98  F (36.7  C)  Pulse:  [65-74] 65  Resp:  [16] 16  BP: (138-160)/() 145/101  SpO2:  [98 %-100 %] 99 %  109 lbs 3.2 oz  Body mass index is 17.63 kg/m .    GENERAL APPEARANCE:  Awake,  appears chronically ill  EYES: Eyes grossly normal to inspection, PERRL and conjunctivae and sclerae normal  HENT: ear canals and TM's normal and nose and mouth without ulcers or lesions  NECK: no adenopathy, no asymmetry, masses, or scars and thyroid normal to palpation  RESP: lungs clear to auscultation - no rales, rhonchi or wheezes  CV: regular rates and rhythm, normal S1 S2, no S3 or S4 and no murmur, click or rub  LYMPHATICS: normal ant/post cervical and supraclavicular nodes  ABDOMEN: soft, nontender, without hepatosplenomegaly or masses and bowel sounds normal  MS: extremities normal- no gross deformities noted  SKIN: no suspicious lesions or rashes      Data   Lab Results   Component Value Date    WBC 8.1 05/13/2021    HGB 7.6 (L) 05/13/2021    HCT 24.3 (L) 05/13/2021     05/13/2021     05/13/2021    POTASSIUM 3.7 05/13/2021    CHLORIDE 100 05/13/2021    CO2 27 05/13/2021    BUN 31 (H) 05/13/2021    CR 7.07 (H) 05/13/2021    GLC 92 05/13/2021    TROPI <0.015 09/09/2020    AST 15 05/12/2021    ALT 34 05/12/2021    ALKPHOS 100 05/12/2021    BILITOTAL 0.3 05/12/2021    INR 1.02 05/10/2021     No results for input(s): CULT in the last 168 hours.  No lab results found.    Invalid input(s): UC

## 2021-05-13 NOTE — PLAN OF CARE
Transfer from heart center around 1830. Patient is A&O x4, forgetful, Gabonese speaking and needs to use jabber. Doesn't seem to understand his health problems. States he wants to stop dialysis, tried to educate patient but he seemed to get frustrated, thinks he doesn't need it. VSS on RA. Up independently, steady on feet. Quantiferon +, airborne isolation, infectious disease consulted. IV SL. Has dialysis tunneled CVC catheter right chest, plan to have dialysis tomorrow. COVID negative 5/9 at outside facility. Continue IV rocephin for CAP, repeat chest XRAY tonight.

## 2021-05-13 NOTE — PLAN OF CARE
VSS. Room air. Tele SR. Up ad judson in room. Patient states he does not understand why he needs to stay in the hospital now that he feels better. RN explained, with Nigerian , that he will be needing dialysis every few days and that needs to be scheduled before he can discharge safely from the Hospital. Voided 150 with residual of 28.Patient also had dialysis on 5/12. Plan for Dialysis 5/14 with possible discharge after if outpatient dialysis has been scheduled.

## 2021-05-13 NOTE — PROGRESS NOTES
Planing HD tomorrow.      Lisinopril increased for BP.      Note + quantiferon gold.    ID to see.        Roberth Gomez MD

## 2021-05-14 ENCOUNTER — APPOINTMENT (OUTPATIENT)
Dept: CT IMAGING | Facility: CLINIC | Age: 46
DRG: 673 | End: 2021-05-14
Attending: HOSPITALIST

## 2021-05-14 PROCEDURE — 250N000013 HC RX MED GY IP 250 OP 250 PS 637: Performed by: INTERNAL MEDICINE

## 2021-05-14 PROCEDURE — 250N000013 HC RX MED GY IP 250 OP 250 PS 637: Performed by: STUDENT IN AN ORGANIZED HEALTH CARE EDUCATION/TRAINING PROGRAM

## 2021-05-14 PROCEDURE — 71250 CT THORAX DX C-: CPT

## 2021-05-14 PROCEDURE — 90937 HEMODIALYSIS REPEATED EVAL: CPT

## 2021-05-14 PROCEDURE — 258N000003 HC RX IP 258 OP 636: Performed by: INTERNAL MEDICINE

## 2021-05-14 PROCEDURE — 99232 SBSQ HOSP IP/OBS MODERATE 35: CPT | Performed by: HOSPITALIST

## 2021-05-14 PROCEDURE — 634N000001 HC RX 634: Performed by: INTERNAL MEDICINE

## 2021-05-14 PROCEDURE — 120N000001 HC R&B MED SURG/OB

## 2021-05-14 PROCEDURE — 99233 SBSQ HOSP IP/OBS HIGH 50: CPT | Performed by: INTERNAL MEDICINE

## 2021-05-14 RX ADMIN — SODIUM CHLORIDE 250 ML: 9 INJECTION, SOLUTION INTRAVENOUS at 10:45

## 2021-05-14 RX ADMIN — Medication 1 CAPSULE: at 13:57

## 2021-05-14 RX ADMIN — CARVEDILOL 12.5 MG: 12.5 TABLET, FILM COATED ORAL at 13:59

## 2021-05-14 RX ADMIN — SODIUM CHLORIDE 300 ML: 9 INJECTION, SOLUTION INTRAVENOUS at 10:45

## 2021-05-14 RX ADMIN — CARVEDILOL 12.5 MG: 12.5 TABLET, FILM COATED ORAL at 20:33

## 2021-05-14 RX ADMIN — LISINOPRIL 20 MG: 20 TABLET ORAL at 13:57

## 2021-05-14 RX ADMIN — EPOETIN ALFA-EPBX 5000 UNITS: 3000 INJECTION, SOLUTION INTRAVENOUS; SUBCUTANEOUS at 10:44

## 2021-05-14 RX ADMIN — AMLODIPINE BESYLATE 10 MG: 10 TABLET ORAL at 13:58

## 2021-05-14 ASSESSMENT — ACTIVITIES OF DAILY LIVING (ADL)
ADLS_ACUITY_SCORE: 10
ADLS_ACUITY_SCORE: 10
ADLS_ACUITY_SCORE: 11
ADLS_ACUITY_SCORE: 10

## 2021-05-14 ASSESSMENT — MIFFLIN-ST. JEOR
SCORE: 1304.8
SCORE: 1324.87

## 2021-05-14 NOTE — PROGRESS NOTES
[facility]    Infectious Disease Progress Note    Date of Service (when I saw the patient): 05/14/2021     Assessment & Plan   Johnny Bingham is a 45 year old male who was admitted on 5/9/2021.     Impression:  1. 45 y.o male with HTN, CKD.   2. Admitted with DWAYNE.   3. As part of initiating dialysis a quantiferon gold was sent which came back pos.   4. Patient immigrated from [place] 18 year ago. Thinks but was PPD negative, but not sure.   5. When admitted did have some cough, right now no cough.   6. Denies weight loss, night sweats, but appears to be chronically ill.   7. No recent CT chest in the chart though HPI does mention a CT chest.      Recommendations:   CT chest done today, no acute active TB finding, patient has no cough.   Remove airborne isolation.   Treat with  mg once daily + Vitamin B 6 daily for latent TB for 9 months       Nani Stewart MD    Interval History   Afebrile   CT chest as noted   No new other micro     Physical Exam   Temp: 98.7  F (37.1  C) Temp src: Oral BP: 136/89 Pulse: 76   Resp: 18 SpO2: 100 % O2 Device: None (Room air)    Vitals:    05/12/21 0615 05/13/21 0544 05/14/21 0603   Weight: 50.3 kg (111 lb) 49.5 kg (109 lb 3.2 oz) 50.7 kg (111 lb 12.8 oz)     Vital Signs with Ranges  Temp:  [98.4  F (36.9  C)-98.7  F (37.1  C)] 98.7  F (37.1  C)  Pulse:  [62-76] 76  Resp:  [16-20] 18  BP: (124-156)/() 136/89  SpO2:  [97 %-100 %] 100 %    Constitutional: Awake, alert, cooperative, no apparent distress  Lungs: Clear to auscultation bilaterally, no crackles or wheezing  Cardiovascular: Regular rate and rhythm, normal S1 and S2, and no murmur noted  Abdomen: Normal bowel sounds, soft, non-distended, non-tender  Skin: No rashes, no cyanosis, no edema  Other:    Medications     heparin         - MEDICATION INSTRUCTIONS for Dialysis Patients -   Does not apply See Admin Instructions     amLODIPine  10 mg Oral Daily     carvedilol  12.5 mg Oral BID  w/meals     heparin Lock (1000 units/mL High concentration)  3 mL Intracatheter Once     heparin Lock (1000 units/mL High concentration)  3 mL Intracatheter Once     lisinopril  20 mg Oral Daily     multivitamin RENAL  1 capsule Oral Daily     - MEDICATION INSTRUCTIONS -   Does not apply Once     sodium chloride (PF)  3 mL Intracatheter Q8H     vitamin D2  50,000 Units Oral Q7 Days       Data   All microbiology laboratory data reviewed.  Recent Labs   Lab Test 05/13/21 0626 05/12/21  0607 05/11/21  0525   WBC 8.1 8.2 7.0   HGB 7.6* 7.3* 7.3*   HCT 24.3* 23.2* 23.0*   MCV 89 87 84    179 167     Recent Labs   Lab Test 05/13/21 0626 05/12/21  0607 05/11/21  0525   CR 7.07* 8.08* 9.61*     No lab results found.  No lab results found.    Invalid input(s):     Attestation:  Total time on the floor involved in the patient's care: 35 minutes. Total time spent in counseling/care coordination: >50%

## 2021-05-14 NOTE — PROGRESS NOTES
Care Management Follow Up    Length of Stay (days): 5    Expected Discharge Date: 05/16/21     Additional Information:  Spoke with Zandra form [facility] They did receive the green card and their spotlight team is working on a determination as I type.  They will advise as soon as the determination is received  A note from infectious disease MD stating the pt has  no evidence of active TB  and Quantferon results has been faxed to Zandra from Omar Michaels RN  Inpatient Care Coordinator  [facility]  [phone]

## 2021-05-14 NOTE — PROGRESS NOTES
"Potassium   Date Value Ref Range Status   05/13/2021 3.7 3.4 - 5.3 mmol/L Final     Hemoglobin   Date Value Ref Range Status   05/13/2021 7.6 (L) 13.3 - 17.7 g/dL Final     Creatinine   Date Value Ref Range Status   05/13/2021 7.07 (H) 0.66 - 1.25 mg/dL Final     Urea Nitrogen   Date Value Ref Range Status   05/13/2021 31 (H) 7 - 30 mg/dL Final     Sodium   Date Value Ref Range Status   05/13/2021 135 133 - 144 mmol/L Final     INR   Date Value Ref Range Status   05/10/2021 1.02 0.86 - 1.14 Final       DIALYSIS PROCEDURE NOTE  Hepatitis status of previous patient on machine log was checked and verified ok to use with this patients hepatitis status.  Patient dialyzed for 3 hrs. on a K3 bath with a net fluid removal of  2.2 L.  A BFR of 400 ml/min was obtained via a RIJ.      The treatment plan was discussed with Dr. Gomez during the treatment.    Total heparin received during the treatment: 0 units.     Line flushed, clamped and capped with heparin 1:1000 1.9 mL (1900 units) per lumen    Meds  given: epogen 5,000u   Complications: Within the last 30 minutes of treatment the patient began moaning audibly and grabbing his side.  The nurse asked if he was having any pain to which he responded, \"Yes.\" The nurse provided a heat pack, turned off the UF, slowed the BFR, and gave a 200mL NS bolus to mitigate cramping.  At this time patient's blood pressure dropped into the 90s as well.  Patient appeared to be dry.  MD notified and approved change.      Person educated: patient. Knowledge base minimal. Barriers to learning: Turkmen speaking. Educated on access care via verbally mode. patient/family nodded understanding; requires follow up. Pt prefers verbal education style.     ICEBOAT? Timeout performed pre-treatment  I: Patient was identified using 2 identifiers  C:  Consent Signed Yes  E: Equipment preventative maintenance is current and dialysis delivery system OK to use  B: Hepatitis B Surface Antigen: Negative; Draw " Date: 5/10/21      Hepatitis B Surface Antibody: Susceptible; Draw Date: 5/10/21  O: Dialysis orders present and complete prior to treatment  A: Vascular access verified and assessed prior to treatment  T: Treatment was performed at a clinically appropriate time  ?: Patient was allowed to ask questions and address concerns prior to treatment  See flowsheet in EPIC for further details and post assessment.  Machine water alarm in place and functioning. Transducer pods intact and checked every 15min.   Pt returned via bed.  Chlorine/Chloramine water system checked every 4 hours.  Outpatient Dialysis on [facility].

## 2021-05-14 NOTE — PLAN OF CARE
Transfer from heart center around 1830. Patient is A&O x4, forgetful, Irish speaking and needs to use jabber. Doesn't seem to understand his health problems. States he wants to stop dialysis, tried to educate patient but he seemed to get frustrated, thinks he doesn't need it. HTN, other VSS on RA. Up independently, steady on feet. Quantiferon +, airborne isolation, infectious disease consulted. IV SL. Has dialysis tunneled CVC catheter right chest, plan to have dialysis tomorrow. COVID negative 5/9 at outside facility. Continue IV rocephin for CAP, chest XRAY unremarkable..

## 2021-05-14 NOTE — PROGRESS NOTES
" Inpatient Dialysis Progress Note            Assessment and Plan:       Johnny Bingham is a 45 year old male who was admitted on 5/9/2021.      1) CKD 5:  It has progressed over the last few years.  Uncontrolled HTN has lead to a fairly rapid decline in his GFR.  It appears that he has no primary renal disease (renal disease due to HTN), but IgA nephropathy is possible.  No reason to biopsy at this point as he would not be a candidate for immune suppression.  His kidney disease has advanced enough that he needs to start hemodialysis.       HD#1 5/10/21  HD #2 5/11/21  HD#3 5/12/21  Placement in outpt HD is underway.       2) HTN: Severe. Coming under control with UF in HD + carvedilol 12.5 mg BID + amlodipine 10 mg daily + lisinopril 20 mg daily.       3) Anemia:  Due to ESRD. EPO 5000 units.  ISAT 15%.  Ferritin 472.       4) MBD:  .  Vit D 14.  On ergocalciferol.       5) Placement:  He will need placement in as outpt HD unit.  It appears he may go to [facility].       6) + Quantiferon Gold:  CT shows \"Scarring and calcified nodularity in the upper lung zones could be  seen with prior granulomatous infection. No CT evidence of active  tuberculosis infection.\"  He is out of respiratory isolation.  Prophylaxis ?       Plan:     HD #4 today  Placement in outpt HD.           Interval History:     Feels OK.  No complaints.        Dialysis Parameters:     Wt Readings from Last 4 Encounters:   05/14/21 50.7 kg (111 lb 12.8 oz)   05/09/21 52.8 kg (116 lb 4.8 oz)   09/16/20 53.1 kg (117 lb)   09/12/20 52.6 kg (116 lb)     I/O last 3 completed shifts:  In: 450 [P.O.:450]  Out: -   BP Readings from Last 3 Encounters:   05/14/21 (!) 144/96   05/09/21 (!) 228/112   09/16/20 (!) 160/100       Routine, ONE TIME, Starting today For 1 Occurrences  Weight Loss (kg): 2-3  Dialysis Temp: 36.5  C  Access Device: TDC  Access Site: R IJ  Dialyzer: Revaclear  Dialysis Bath: K   Blood Flow Rate (mL/min): 400  Total Treatment Time " (hrs): 3  Heparin: no         Medications and Allergies:   Reviewed in EPIC      - MEDICATION INSTRUCTIONS for Dialysis Patients -   Does not apply See Admin Instructions     amLODIPine  10 mg Oral Daily     carvedilol  12.5 mg Oral BID w/meals     heparin Lock (1000 units/mL High concentration)  3 mL Intracatheter Once     heparin Lock (1000 units/mL High concentration)  3 mL Intracatheter Once     lisinopril  20 mg Oral Daily     multivitamin RENAL  1 capsule Oral Daily     - MEDICATION INSTRUCTIONS -   Does not apply Once     sodium chloride (PF)  3 mL Intracatheter Q8H     vitamin D2  50,000 Units Oral Q7 Days     sodium chloride 0.9%, acetaminophen, calcium carbonate, fentaNYL, flumazenil, heparin, hydrALAZINE, labetalol, lidocaine 4%, lidocaine (buffered or not buffered), melatonin, midazolam, naloxone **OR** [DISCONTINUED] naloxone **OR** naloxone **OR** [DISCONTINUED] naloxone, [DISCONTINUED] naloxone **OR** naloxone **OR** [DISCONTINUED] naloxone **OR** naloxone, ondansetron **OR** ondansetron, oxyCODONE-acetaminophen, polyethylene glycol, prochlorperazine **OR** prochlorperazine **OR** prochlorperazine, senna-docusate **OR** senna-docusate, sodium chloride (PF)   No Known Allergies           Labs:     BMP  Recent Labs   Lab 05/13/21  0626 05/12/21  0607 05/11/21  0525 05/10/21  0527    138 138 135   POTASSIUM 3.7 3.9 3.7 4.3   CHLORIDE 100 103 104 106   BEAU 7.9* 8.4* 7.9* 7.2*   CO2 27 28 22 18*   BUN 31* 43* 73* 118*   CR 7.07* 8.08* 9.61* 12.60*   GLC 92 103* 110* 116*     CBC  Recent Labs   Lab 05/13/21 0626 05/12/21  0607 05/11/21  0525 05/10/21  2112 05/10/21  0527   WBC 8.1 8.2 7.0  --  6.2   HGB 7.6* 7.3* 7.3* 7.4* 6.2*   HCT 24.3* 23.2* 23.0*  --  19.4*   MCV 89 87 84  --  86    179 167  --  128*     Lab Results   Component Value Date    AST 15 05/12/2021    ALT 34 05/12/2021    ALKPHOS 100 05/12/2021    BILITOTAL 0.3 05/12/2021            Physical Exam:   Vitals were reviewed in  EPIC    Wt Readings from Last 3 Encounters:   05/14/21 50.7 kg (111 lb 12.8 oz)   05/09/21 52.8 kg (116 lb 4.8 oz)   09/16/20 53.1 kg (117 lb)     No intake or output data in the 24 hours ending 05/14/21 1100    GENERAL APPEARANCE: pleasant, no distress, a & o  HEENT:  Eyes/ears/nose grossly normal, neck supple  RESP: lungs clear to auscultation with good efforts, no crackles, rhonchi or wheezes  Swelling at TDC exit site improved.    CV: regular rate and rhythm, normal S1 S2, no murmur, click or rub   ABDOMEN: soft, nontender, bowel sounds normal  EXTREMITIES/SKIN: no edema, no rashes or lesions     Pt seen on dialysis.  Stable run.  Good BFR.      Attestation:  I have reviewed today's vital signs, notes, medications, labs and imaging.     Roberth Gomez MD  [facility] - Nephrology  [phone]

## 2021-05-14 NOTE — PROGRESS NOTES
[facility]    Medicine Progress Note - Hospitalist Service       Date of Admission:  5/9/2021  Assessment & Plan       Johnny Bingham is a 45 year old male with a past medical history of hypertension and chronic kidney disease who presented to an outside hospital due to fatigue, generalized weakness, cough, and low urine output.  He is found to have acute renal failure, along with hypertensive urgency and concern for possible community-acquired pneumonia.  He was transferred here to [facility] for admission and further treatment.     Acute kidney injury  Chronic kidney disease  He was hospitalized here from 9/9/2020 to 9/13/2020 with hypertensive urgency and finding of chronic kidney disease stage V without clear etiology.  Nephrology evaluated the patient evaluated the patient at that time.  He was to follow-up as outpatient with nephrology, but has not yet done so.  Last creatinine here was 5.58.  Creatinine is now 12.9.  BUN elevated.  Electrolytes WNL.  Appears dry on exam. CT abdomen/pelvis does not show evidence of urinary obstruction, although reportedly had large output once Carnes catheter placed.   Plan:  - continue HD via right tunneled cath  - Avoid nephrotoxins  - carnes out  - counseled in depth with  via [employer]    Quantiferon gold positive  Urdu immigrant with no past positive or treatment per his report  Denies weight loss, cough, hemoptysis, etc  CT of the abdomen revealed lower lung fields (done prior to transfer from [facility])  Plan  - ID consulted  - CT chest pending     Hypertensive urgency  Patient was admitted in September 2020 with hypertensive urgency, with symptoms of headache and lightheadedness.  He was started on amlodipine and Coreg.  Patient states he is stopped taking any blood pressure medications at home.  He does not check his blood pressures regularly.  Now presents with blood pressures up to 236/141 on arrival to the ER.  Required nicardipine drip at ER, but blood pressures have improved so this is no longer needed.  Plan:  - Labetalol IV and hydralazine IV as needed  - Amlodipine 10 mg daily, carvedilol 6.25 mg BID now up to 12.5 mg BID (5/12)  - Lisinopril 10 mg daily started 5/12  - appreciate Nephrology management, defer to them unless I am told otherwise     Community-acquired pneumonia, possibly  Possible infiltrate on CXR.  Patient reports productive cough initially but none now.  Afebrile.  No evidence for sepsis.  Treated with cefdinir and azithromycin in ER.  CT shows patchy bibasilar airspace opacities, favor atelectasis over pneumonia per radiology.  - completed 5 days for CAP  - Monitor for worsening signs of infection.  - Pulmonary hygiene     Cardiomegaly  Seen on imaging  - Echocardiogram -> LVSF normal, EF 55-60%, mod concLVH, grade II or mod diastolic dysfunction    Anemia of chronic disease (ESRD)  Iron sat 15%, Ferritin 472. Hgb 9 or so.  On adm 6.2 hgb.   - nephro managing - EPO given.      Physical deconditioning/generalized weakness  Likely related to renal failure with uremia, and possibly pneumonia.  - Treat the above issues, and monitor  - PT/OT to eval       Diet: Room Service  Combination Diet Renal Diet    DVT Prophylaxis: Pneumatic Compression Devices  Miles Catheter: not present  Code Status: Full Code           Disposition Plan   Expected discharge: pending nephro plans - will need outpatient HD arranged. Positive quant will delay this  Entered: Octavio Quinn DO 05/14/2021, 8:24 AM       The patient's care was discussed with the Bedside Nurse and Patient.    Octavio Quinn DO  Hospitalist Service  [facility]  Contact information available via [facility] Paging/Directory    ______________________________________________________________________    Interval History   Patient seen and examined this morning. Jabber used for interpreting, discussed need to determine TB  status prior to enrollment in outpatient HD.     Data reviewed today: I reviewed all medications, new labs and imaging results over the last 24 hours. I personally reviewed no images or EKG's today.    Physical Exam   Vital Signs: Temp: 98.7  F (37.1  C) Temp src: Oral BP: (!) 138/93 Pulse: 64   Resp: 20 SpO2: 99 % O2 Device: None (Room air)    Weight: 111 lbs 12.8 oz      Gen: NAD, pleasant, Luxembourgish speaking - jabber  used  HEENT: Normocephalic, EOMI, MMM  Resp: no crackles,  no wheezes, no increased work of resp  CV: S1S2 heard, reg rhythm, reg rate  Abdo: soft, nontender, nondistended, bowel sounds present  Ext: calves nontender, well perfused  Neuro: AAOx3, CN grossly intact, no facial asymmetry      Data   Recent Labs   Lab 05/13/21  0626 05/12/21  0607 05/11/21  0525 05/10/21  1102 05/10/21  1102 05/10/21  0527   WBC 8.1 8.2 7.0  --   --  6.2   HGB 7.6* 7.3* 7.3*   < >  --  6.2*   MCV 89 87 84  --   --  86    179 167  --   --  128*   INR  --   --   --   --  1.02  --     138 138  --   --  135   POTASSIUM 3.7 3.9 3.7  --   --  4.3   CHLORIDE 100 103 104  --   --  106   CO2 27 28 22  --   --  18*   BUN 31* 43* 73*  --   --  118*   CR 7.07* 8.08* 9.61*  --   --  12.60*   ANIONGAP 8 7 12  --   --  11   BEAU 7.9* 8.4* 7.9*  --   --  7.2*   GLC 92 103* 110*  --   --  116*   ALBUMIN  --  3.0* 2.9*  --   --  2.7*   PROTTOTAL  --  7.0  --   --   --  6.1*   BILITOTAL  --  0.3  --   --   --  0.3   ALKPHOS  --  100  --   --   --  106   ALT  --  34  --   --   --  47   AST  --  15  --   --   --  12    < > = values in this interval not displayed.     Recent Results (from the past 24 hour(s))   XR Chest 2 Views    Narrative    CHEST TWO VIEWS    5/13/2021 7:01 PM     HISTORY: Pneumonia, TB positive QuantiFeron but minimal respiratory  infection.    COMPARISON: None.      Impression    IMPRESSION: PA and lateral views of the chest. Lungs are clear. Heart  is normal in size. No effusions are evident. No  pneumothorax. Right IJ  dual-lumen catheter terminates in the right atrium.    DEON ARREOLA MD

## 2021-05-14 NOTE — PLAN OF CARE
Cognitive Concerns/ Orientation: A&Ox4, flat affect, Chinese speaking, jabber utilized   BEHAVIOR & AGGRESSION TOOL COLOR: Green  CIWA SCORE: NA   ABNL VS/O2: VSS on RA  MOBILITY: Ind in the room  PAIN MANAGMENT: Denies  DIET: Renal-good appetite  BOWEL/BLADDER: Continent  ABNL LAB/BG: None new. Cr 7.03 on 5/13  DRAIN/DEVICES: CVC tunnel cath R chest for hemodialysis  TELEMETRY RHYTHM: NA  SKIN: WNL  TESTS/PROCEDURES: HD completed today  D/C DAY/GOALS/PLACE: Discharge pending Nephrology plans.  OTHER IMPORTANT INFO: discontinued airborne iso per Dr Stewart. No cough, clear lung sounds. Continues on IV Rocephin. ID/ Nephology following.

## 2021-05-14 NOTE — PLAN OF CARE
Summary:  Acute renal failure/ hypertensive urgency/ CAP   DATE & TIME: 5/14/21 [id number]   Cognitive Concerns/ Orientation : A&O x4, English speaking, jabber utilized   BEHAVIOR & AGGRESSION TOOL COLOR: Green  CIWA SCORE: NA   ABNL VS/O2: VSS on RA  MOBILITY: Ind  PAIN MANAGMENT: Denies  DIET: Renal  BOWEL/BLADDER: Continent  ABNL LAB/BG: None new  DRAIN/DEVICES: CVC tunnel cath R chest for HD  TELEMETRY RHYTHM: NA  SKIN: WNL  TESTS/PROCEDURES: HD today  D/C DAY/GOALS/PLACE: Discharge pending Nephrology plans.  OTHER IMPORTANT INFO: Airborne precautions maintained for TB rule out. Continues on IV Rocephin. ID/ Nephology following.

## 2021-05-15 PROCEDURE — 99232 SBSQ HOSP IP/OBS MODERATE 35: CPT | Performed by: HOSPITALIST

## 2021-05-15 PROCEDURE — 250N000013 HC RX MED GY IP 250 OP 250 PS 637: Performed by: INTERNAL MEDICINE

## 2021-05-15 PROCEDURE — C1729 CATH, DRAINAGE: HCPCS

## 2021-05-15 PROCEDURE — 120N000001 HC R&B MED SURG/OB

## 2021-05-15 PROCEDURE — 250N000013 HC RX MED GY IP 250 OP 250 PS 637: Performed by: STUDENT IN AN ORGANIZED HEALTH CARE EDUCATION/TRAINING PROGRAM

## 2021-05-15 RX ORDER — ISONIAZID 300 MG/1
300 TABLET ORAL DAILY
Status: DISCONTINUED | OUTPATIENT
Start: 2021-05-15 | End: 2021-05-17 | Stop reason: HOSPADM

## 2021-05-15 RX ADMIN — ISONIAZID 300 MG: 300 TABLET ORAL at 13:07

## 2021-05-15 RX ADMIN — CARVEDILOL 12.5 MG: 12.5 TABLET, FILM COATED ORAL at 20:11

## 2021-05-15 RX ADMIN — Medication 1 CAPSULE: at 10:01

## 2021-05-15 RX ADMIN — CARVEDILOL 12.5 MG: 12.5 TABLET, FILM COATED ORAL at 10:01

## 2021-05-15 RX ADMIN — LISINOPRIL 20 MG: 20 TABLET ORAL at 10:01

## 2021-05-15 RX ADMIN — AMLODIPINE BESYLATE 10 MG: 10 TABLET ORAL at 10:01

## 2021-05-15 RX ADMIN — Medication 100 MG: at 13:08

## 2021-05-15 ASSESSMENT — ACTIVITIES OF DAILY LIVING (ADL)
ADLS_ACUITY_SCORE: 10

## 2021-05-15 NOTE — PROGRESS NOTES
Had multiple conversations via [employer]  regarding patients repeated expressions of wanting to go home today. Patient is frustrated. Educated patient on importance of dialysis and what happens to the body when he does not have dialysis, including stating that he would die if he does not get regularly scheduled dialysis. Patient quit responding to  so conversations stopped. Dr Quinn aware and has had multiple conversation with patient regarding this topic as well.

## 2021-05-15 NOTE — PROGRESS NOTES
[facility]    Medicine Progress Note - Hospitalist Service       Date of Admission:  5/9/2021  Assessment & Plan       Johnny Bingham is a 45 year old male with a past medical history of hypertension and chronic kidney disease who presented to an outside hospital due to fatigue, generalized weakness, cough, and low urine output.  He is found to have acute renal failure, along with hypertensive urgency and concern for possible community-acquired pneumonia.  He was transferred here to [facility] for admission and further treatment.     Acute kidney injury  Chronic kidney disease  He was hospitalized here from 9/9/2020 to 9/13/2020 with hypertensive urgency and finding of chronic kidney disease stage V without clear etiology.  Nephrology evaluated the patient evaluated the patient at that time.  He was to follow-up as outpatient with nephrology, but has not yet done so.  Last creatinine here was 5.58.  Creatinine is now 12.9.  BUN elevated.  Electrolytes WNL.  Appears dry on exam. CT abdomen/pelvis does not show evidence of urinary obstruction, although reportedly had large output once Carnes catheter placed.   Plan:  - continue HD via right tunneled cath  - Avoid nephrotoxins  - carnes out  - counseled in depth with  via Loganer with [place] . He wants to leave. I counseled in depth about how not receiving dialysis will result in his death. I used different ways to facilitate this, and he indicated his understanding. He is able to make a decision to leave AMA if he desires. I did offer to call family, but he declined. He has been agreeable to stay so far, but if he continues to feel this way perhaps hospice and palliative care would be a good option    Quantiferon gold positive  [place] immigrant with no past positive or treatment per his report  Denies weight loss, cough, hemoptysis, etc  CT of the abdomen revealed lower lung fields (done prior to  transfer from [facility])  Plan  - ID consulted, isoniazide and b6 for 9 months     Hypertensive urgency  Patient was admitted in September 2020 with hypertensive urgency, with symptoms of headache and lightheadedness.  He was started on amlodipine and Coreg.  Patient states he is stopped taking any blood pressure medications at home.  He does not check his blood pressures regularly.  Now presents with blood pressures up to 236/141 on arrival to the ER. Required nicardipine drip at ER, but blood pressures have improved so this is no longer needed.  Plan:  - Labetalol IV and hydralazine IV as needed  - Amlodipine 10 mg daily, carvedilol 6.25 mg BID now up to 12.5 mg BID (5/12)  - Lisinopril 10 mg daily started 5/12  - appreciate Nephrology management, defer to them unless I am told otherwise     Community-acquired pneumonia, possibly  Possible infiltrate on CXR.  Patient reports productive cough initially but none now.  Afebrile.  No evidence for sepsis.  Treated with cefdinir and azithromycin in ER.  CT shows patchy bibasilar airspace opacities, favor atelectasis over pneumonia per radiology.  - completed 5 days for CAP  - Monitor for worsening signs of infection.  - Pulmonary hygiene     Cardiomegaly  Seen on imaging  - Echocardiogram -> LVSF normal, EF 55-60%, mod concLVH, grade II or mod diastolic dysfunction    Anemia of chronic disease (ESRD)  Iron sat 15%, Ferritin 472. Hgb 9 or so.  On adm 6.2 hgb.   - nephro managing - EPO given.      Physical deconditioning/generalized weakness  Likely related to renal failure with uremia, and possibly pneumonia.  - Treat the above issues, and monitor  - PT/OT to eval       Diet: Room Service  Combination Diet Renal Diet    DVT Prophylaxis: Pneumatic Compression Devices  Miles Catheter: not present  Code Status: Full Code           Disposition Plan   Expected discharge: pending nephro plans - will need outpatient HD arranged. Positive quant will delay this  Entered: Octavio CASTAÑEDA  DO Kai 05/15/2021, 1:40 PM       The patient's care was discussed with the Bedside Nurse and Patient.    Octavio Quinn DO  Hospitalist Service  [facility]  Contact information available via [facility] Paging/Directory    ______________________________________________________________________    Interval History   Patient seen and examined this morning. Brauliobber used for interpreting, he wants to leave. I reviewed the risks in detail and he indicated he understood. At this point leaving would of course be against medical advice.    Data reviewed today: I reviewed all medications, new labs and imaging results over the last 24 hours. I personally reviewed no images or EKG's today.    Physical Exam   Vital Signs: Temp: 98.8  F (37.1  C) Temp src: Oral BP: 131/88 Pulse: 68   Resp: 18 SpO2: 99 % O2 Device: None (Room air)    Weight: 107 lbs 6 oz      Gen: NAD, pleasant, Croatian speaking - jabber  used  HEENT: Normocephalic, EOMI, MMM  Resp: no crackles,  no wheezes, no increased work of resp  CV: S1S2 heard, reg rhythm, reg rate  Abdo: soft, nontender, nondistended, bowel sounds present  Ext: calves nontender, well perfused  Neuro: AAOx3, CN grossly intact, no facial asymmetry      Data   Recent Labs   Lab 05/13/21  0626 05/12/21  0607 05/11/21  0525 05/10/21  1102 05/10/21  1102 05/10/21  0527   WBC 8.1 8.2 7.0  --   --  6.2   HGB 7.6* 7.3* 7.3*   < >  --  6.2*   MCV 89 87 84  --   --  86    179 167  --   --  128*   INR  --   --   --   --  1.02  --     138 138  --   --  135   POTASSIUM 3.7 3.9 3.7  --   --  4.3   CHLORIDE 100 103 104  --   --  106   CO2 27 28 22  --   --  18*   BUN 31* 43* 73*  --   --  118*   CR 7.07* 8.08* 9.61*  --   --  12.60*   ANIONGAP 8 7 12  --   --  11   BEAU 7.9* 8.4* 7.9*  --   --  7.2*   GLC 92 103* 110*  --   --  116*   ALBUMIN  --  3.0* 2.9*  --   --  2.7*   PROTTOTAL  --  7.0  --   --   --  6.1*   BILITOTAL  --  0.3  --   --   --   0.3   ALKPHOS  --  100  --   --   --  106   ALT  --  34  --   --   --  47   AST  --  15  --   --   --  12    < > = values in this interval not displayed.     No results found for this or any previous visit (from the past 24 hour(s)).

## 2021-05-15 NOTE — PLAN OF CARE
Cognitive Concerns/Orientation: A&Ox4, Beninese speaking,  used via Geni. Frustrated, wants to leave-MD discussed with patient.  BEHAVIOR & AGGRESSION TOOL COLOR: Green  CIWA SCORE: NA   ABNL VS/O2: VSS on RA  MOBILITY: Ind  PAIN MANAGMENT: Denies  DIET: Renal-good appetite  BOWEL/BLADDER: Continent  ABNL LAB/BG: Latent TB  DRAIN/DEVICES: CVC tunnel cath R chest for HD  TELEMETRY RHYTHM: NA  SKIN: WNL  TESTS/PROCEDURES: NA  D/C DAY/GOALS/PLACE: Discharge pending outpatient dialysis chair set up  OTHER IMPORTANT INFO: Clear lung sounds, no cough. Started treatment for latent TB

## 2021-05-15 NOTE — PLAN OF CARE
DATE & TIME: 5/14 1900-0730  Summary:  Acute renal failure/hypertensive urgency/CAP     Cognitive Concerns/Orientation: A&Ox4, Romanian speaking,  used via iPad.  BEHAVIOR & AGGRESSION TOOL COLOR: Green  CIWA SCORE: NA   ABNL VS/O2: VSS on RA  MOBILITY: Ind  PAIN MANAGMENT: Denies  DIET: Renal  BOWEL/BLADDER: Continent  ABNL LAB/BG: Latent TB  DRAIN/DEVICES: CVC tunnel cath R chest for HD  TELEMETRY RHYTHM: NA  SKIN: WNL  TESTS/PROCEDURES: NA  D/C DAY/GOALS/PLACE: Discharge pending Nephrology plans/ Dialysis plan.  OTHER IMPORTANT INFO: discontinued airborne iso per Dr Stewart. No cough, clear lung sounds. ID/ Nephology following.

## 2021-05-16 PROCEDURE — 99232 SBSQ HOSP IP/OBS MODERATE 35: CPT | Performed by: HOSPITALIST

## 2021-05-16 PROCEDURE — 250N000013 HC RX MED GY IP 250 OP 250 PS 637: Performed by: INTERNAL MEDICINE

## 2021-05-16 PROCEDURE — 250N000013 HC RX MED GY IP 250 OP 250 PS 637: Performed by: STUDENT IN AN ORGANIZED HEALTH CARE EDUCATION/TRAINING PROGRAM

## 2021-05-16 PROCEDURE — 120N000001 HC R&B MED SURG/OB

## 2021-05-16 RX ADMIN — ISONIAZID 300 MG: 300 TABLET ORAL at 08:19

## 2021-05-16 RX ADMIN — Medication 100 MG: at 08:19

## 2021-05-16 RX ADMIN — AMLODIPINE BESYLATE 10 MG: 10 TABLET ORAL at 08:19

## 2021-05-16 RX ADMIN — Medication 1 CAPSULE: at 08:19

## 2021-05-16 RX ADMIN — CARVEDILOL 12.5 MG: 12.5 TABLET, FILM COATED ORAL at 19:21

## 2021-05-16 RX ADMIN — CARVEDILOL 12.5 MG: 12.5 TABLET, FILM COATED ORAL at 08:19

## 2021-05-16 RX ADMIN — LISINOPRIL 20 MG: 20 TABLET ORAL at 08:18

## 2021-05-16 ASSESSMENT — ACTIVITIES OF DAILY LIVING (ADL)
ADLS_ACUITY_SCORE: 10

## 2021-05-16 ASSESSMENT — MIFFLIN-ST. JEOR: SCORE: 1353.45

## 2021-05-16 NOTE — PROGRESS NOTES
[facility]    Medicine Progress Note - Hospitalist Service       Date of Admission:  5/9/2021  Assessment & Plan       Johnny Bingham is a 45 year old male with a past medical history of hypertension and chronic kidney disease who presented to an outside hospital due to fatigue, generalized weakness, cough, and low urine output.  He is found to have acute renal failure, along with hypertensive urgency and concern for possible community-acquired pneumonia.  He was transferred here to [facility] for admission and further treatment.     Acute kidney injury  Chronic kidney disease  He was hospitalized here from 9/9/2020 to 9/13/2020 with hypertensive urgency and finding of chronic kidney disease stage V without clear etiology.  Nephrology evaluated the patient evaluated the patient at that time.  He was to follow-up as outpatient with nephrology, but has not yet done so.  Last creatinine here was 5.58.  Creatinine is now 12.9.  BUN elevated.  Electrolytes WNL.  Appears dry on exam. CT abdomen/pelvis does not show evidence of urinary obstruction, although reportedly had large output once Carnes catheter placed.   Plan:  - continue HD via right tunneled cath  - Avoid nephrotoxins  - carnes out  - patient remains upset with the wait for HD bed. At this point he has stayed. If he leaves AMA, he should be told to return on his dialysis day. Obviously given situation he needs to stay until chair is placed or leave AMA. Have counseled extensively with Azeri . He repeatedly denied me talking with friends/family in attempt to provide pressure for him to stay.    Quantiferon gold positive  [place] immigrant with no past positive or treatment per his report  Denies weight loss, cough, hemoptysis, etc  CT of the abdomen revealed lower lung fields (done prior to transfer from [facility])  Plan  - ID consulted, isoniazide and b6 for 9 months     Hypertensive urgency  Patient was  admitted in September 2020 with hypertensive urgency, with symptoms of headache and lightheadedness.  He was started on amlodipine and Coreg.  Patient states he is stopped taking any blood pressure medications at home.  He does not check his blood pressures regularly.  Now presents with blood pressures up to 236/141 on arrival to the ER. Required nicardipine drip at ER, but blood pressures have improved so this is no longer needed.  Plan:  - Labetalol IV and hydralazine IV as needed  - Amlodipine 10 mg daily, carvedilol 6.25 mg BID now up to 12.5 mg BID (5/12)  - Lisinopril 10 mg daily started 5/12  - appreciate Nephrology management, defer to them unless I am told otherwise     Community-acquired pneumonia, possibly  Possible infiltrate on CXR.  Patient reports productive cough initially but none now.  Afebrile.  No evidence for sepsis.  Treated with cefdinir and azithromycin in ER.  CT shows patchy bibasilar airspace opacities, favor atelectasis over pneumonia per radiology.  - completed 5 days for CAP  - Monitor for worsening signs of infection.  - Pulmonary hygiene     Cardiomegaly  Seen on imaging  - Echocardiogram -> LVSF normal, EF 55-60%, mod concLVH, grade II or mod diastolic dysfunction    Anemia of chronic disease (ESRD)  Iron sat 15%, Ferritin 472. Hgb 9 or so.  On adm 6.2 hgb.   - nephro managing - EPO given.      Physical deconditioning/generalized weakness  Likely related to renal failure with uremia, and possibly pneumonia.  - Treat the above issues, and monitor  - PT/OT to eval       Diet: Room Service  Combination Diet Renal Diet    DVT Prophylaxis: Pneumatic Compression Devices  Miles Catheter: not present  Code Status: Full Code           Disposition Plan   Expected discharge: pending nephro plans - will need outpatient HD arranged. Positive quant will delay this  Entered: Octavio Quinn DO 05/16/2021, 10:21 AM       The patient's care was discussed with the Bedside Nurse and  Patient.    Octavio Quinn, DO  Hospitalist Service  [facility]  Contact information available via [facility] Paging/Directory    ______________________________________________________________________    Interval History   Patient seen and examined this morning. Jabber used for interpreting. He has no complaints, appears frustrated and unhappy but does not verbalize this.    Data reviewed today: I reviewed all medications, new labs and imaging results over the last 24 hours. I personally reviewed no images or EKG's today.    Physical Exam   Vital Signs: Temp: 98.5  F (36.9  C) Temp src: Oral BP: 128/85 Pulse: 65   Resp: 16 SpO2: 98 % O2 Device: None (Room air)    Weight: 118 lbs 1.6 oz      Gen: NAD, pleasant, Maltese speaking - jabber  used  HEENT: Normocephalic, EOMI, MMM  Resp: no crackles,  no wheezes, no increased work of resp  CV: S1S2 heard, reg rhythm, reg rate  Abdo: soft, nontender, nondistended, bowel sounds present  Ext: calves nontender, well perfused  Neuro: AAOx3, CN grossly intact, no facial asymmetry      Data   Recent Labs   Lab 05/13/21  0626 05/12/21  0607 05/11/21  0525 05/10/21  1102 05/10/21  1102 05/10/21  0527   WBC 8.1 8.2 7.0  --   --  6.2   HGB 7.6* 7.3* 7.3*   < >  --  6.2*   MCV 89 87 84  --   --  86    179 167  --   --  128*   INR  --   --   --   --  1.02  --     138 138  --   --  135   POTASSIUM 3.7 3.9 3.7  --   --  4.3   CHLORIDE 100 103 104  --   --  106   CO2 27 28 22  --   --  18*   BUN 31* 43* 73*  --   --  118*   CR 7.07* 8.08* 9.61*  --   --  12.60*   ANIONGAP 8 7 12  --   --  11   BEAU 7.9* 8.4* 7.9*  --   --  7.2*   GLC 92 103* 110*  --   --  116*   ALBUMIN  --  3.0* 2.9*  --   --  2.7*   PROTTOTAL  --  7.0  --   --   --  6.1*   BILITOTAL  --  0.3  --   --   --  0.3   ALKPHOS  --  100  --   --   --  106   ALT  --  34  --   --   --  47   AST  --  15  --   --   --  12    < > = values in this interval not displayed.     No  results found for this or any previous visit (from the past 24 hour(s)).

## 2021-05-16 NOTE — PLAN OF CARE
TE & TIME: 5/15 3526-4618  Summary:  Acute renal failure/hypertensive urgency/CAP   Cognitive Concerns/Orientation: A&Ox4, [place] Islander speaking.   BEHAVIOR & AGGRESSION TOOL COLOR: Green  CIWA SCORE: NA   ABNL VS/O2: VSS on RA  MOBILITY: Ind  PAIN MANAGMENT: Denies  DIET: Renal-good appetite  BOWEL/BLADDER: Continent  ABNL LAB/BG: Latent TB  DRAIN/DEVICES: CVC tunnel cath R chest for HD  TELEMETRY RHYTHM: NA  SKIN: WNL  TESTS/PROCEDURES: NA  D/C DAY/GOALS/PLACE: Discharge pending outpatient dialysis chair set up  OTHER IMPORTANT INFO: Clear lung sounds, no cough. Started treatment for latent TB

## 2021-05-16 NOTE — PROGRESS NOTES
"BRIEF NUTRITION ASSESSMENT      REASON FOR ASSESSMENT:  Johnny Bingham is a 45 year old male assessed by Registered Dietitian for LOS      CURRENT DIET AND INTAKE:  Diet:  Renal            Room Service with Assist              Chart reviewed  5/10: started dialysis  Plan discharge when outpatient dialysis chair found  Pt tolerating po  Has been ordering 3 full meals/day  Flowsheets reflect meal intake has been ~100%    ANTHROPOMETRICS:  Height: 5' 6\"  Weight:(5/16) 53.6 kg /  118 lbs 1.6 oz  Body mass index is 19.06 kg/m .   Weight Status: Normal BMI  IBW:  64.5 kg  %IBW: 83%  Weight History: wt stable  Wt Readings from Last 10 Encounters:   05/16/21 53.6 kg (118 lb 1.6 oz)   05/09/21 52.8 kg (116 lb 4.8 oz)   09/16/20 53.1 kg (117 lb)   09/12/20 52.6 kg (116 lb)   09/09/20 54.2 kg (119 lb 6.4 oz)         LABS:  Labs noted    MALNUTRITION:  Do not suspect acute muscle/fat losses.  Wt has been stable  Tolerating po intake  Patient does not meet two of the criteria necessary for diagnosing malnutrition.       NUTRITION INTERVENTION:  Nutrition Diagnosis:  No nutrition diagnosis at this time.    Implementation:  Nutrition Education ---> Per Provider order if indicated (pt will be followed by a dietitian at his outpt dialysis center)  Will change diet to 'Dialysis'  (currently on 'renal' diet)    FOLLOW UP/MONITORING:   Will re-evaluate in 7 - 10 days, or sooner, if re-consulted.          "

## 2021-05-16 NOTE — PROGRESS NOTES
Planning HD tomorrow.  Placement in HD unit is underway.  Now that it has been determined that he does not have active TB, this should be fairly quick.   Possibly even discharge tomorrow after HD.    Roberth Gomez MD

## 2021-05-16 NOTE — PLAN OF CARE
Alert and oriented x4, Ivorian speaking. VSS. Up independently. Denies pain. Plan for dialysis tomorrow possible discharge tomorrow if has a dialysis unit. Will continue to monitor tonight.

## 2021-05-17 VITALS
SYSTOLIC BLOOD PRESSURE: 129 MMHG | WEIGHT: 117.8 LBS | HEART RATE: 84 BPM | HEIGHT: 66 IN | DIASTOLIC BLOOD PRESSURE: 96 MMHG | BODY MASS INDEX: 18.93 KG/M2 | TEMPERATURE: 98.4 F | RESPIRATION RATE: 16 BRPM | OXYGEN SATURATION: 100 %

## 2021-05-17 LAB
ALBUMIN SERPL-MCNC: 3 G/DL (ref 3.4–5)
ANION GAP SERPL CALCULATED.3IONS-SCNC: 5 MMOL/L (ref 3–14)
BUN SERPL-MCNC: 32 MG/DL (ref 7–30)
CALCIUM SERPL-MCNC: 7.8 MG/DL (ref 8.5–10.1)
CHLORIDE SERPL-SCNC: 104 MMOL/L (ref 94–109)
CO2 SERPL-SCNC: 28 MMOL/L (ref 20–32)
CREAT SERPL-MCNC: 4.71 MG/DL (ref 0.66–1.25)
GFR SERPL CREATININE-BSD FRML MDRD: 14 ML/MIN/{1.73_M2}
GLUCOSE SERPL-MCNC: 95 MG/DL (ref 70–99)
HGB BLD-MCNC: 8.1 G/DL (ref 13.3–17.7)
PHOSPHATE SERPL-MCNC: 3.1 MG/DL (ref 2.5–4.5)
POTASSIUM SERPL-SCNC: 3.5 MMOL/L (ref 3.4–5.3)
SODIUM SERPL-SCNC: 137 MMOL/L (ref 133–144)

## 2021-05-17 PROCEDURE — 90935 HEMODIALYSIS ONE EVALUATION: CPT | Performed by: INTERNAL MEDICINE

## 2021-05-17 PROCEDURE — 90937 HEMODIALYSIS REPEATED EVAL: CPT

## 2021-05-17 PROCEDURE — 250N000013 HC RX MED GY IP 250 OP 250 PS 637: Performed by: STUDENT IN AN ORGANIZED HEALTH CARE EDUCATION/TRAINING PROGRAM

## 2021-05-17 PROCEDURE — 258N000003 HC RX IP 258 OP 636: Performed by: INTERNAL MEDICINE

## 2021-05-17 PROCEDURE — 250N000013 HC RX MED GY IP 250 OP 250 PS 637: Performed by: INTERNAL MEDICINE

## 2021-05-17 PROCEDURE — 85018 HEMOGLOBIN: CPT | Performed by: INTERNAL MEDICINE

## 2021-05-17 PROCEDURE — 99239 HOSP IP/OBS DSCHRG MGMT >30: CPT | Performed by: HOSPITALIST

## 2021-05-17 PROCEDURE — 634N000001 HC RX 634: Performed by: INTERNAL MEDICINE

## 2021-05-17 PROCEDURE — 80069 RENAL FUNCTION PANEL: CPT | Performed by: INTERNAL MEDICINE

## 2021-05-17 RX ORDER — LISINOPRIL 20 MG/1
20 TABLET ORAL DAILY
Qty: 30 TABLET | Refills: 0 | Status: SHIPPED | OUTPATIENT
Start: 2021-05-18 | End: 2021-08-13

## 2021-05-17 RX ORDER — ISONIAZID 300 MG/1
300 TABLET ORAL DAILY
Qty: 90 TABLET | Refills: 0 | Status: ON HOLD | OUTPATIENT
Start: 2021-05-18 | End: 2022-02-14

## 2021-05-17 RX ORDER — ISONIAZID 300 MG/1
300 TABLET ORAL DAILY
Qty: 90 TABLET | Refills: 2 | Status: SHIPPED | OUTPATIENT
Start: 2021-05-18 | End: 2021-05-17

## 2021-05-17 RX ORDER — AMLODIPINE BESYLATE 10 MG/1
10 TABLET ORAL DAILY
Qty: 30 TABLET | Refills: 0 | Status: SHIPPED | OUTPATIENT
Start: 2021-05-18 | End: 2021-08-13

## 2021-05-17 RX ORDER — CARVEDILOL 12.5 MG/1
12.5 TABLET ORAL 2 TIMES DAILY WITH MEALS
Qty: 60 TABLET | Refills: 0 | Status: SHIPPED | OUTPATIENT
Start: 2021-05-17 | End: 2021-08-13

## 2021-05-17 RX ADMIN — ISONIAZID 300 MG: 300 TABLET ORAL at 11:51

## 2021-05-17 RX ADMIN — LISINOPRIL 20 MG: 20 TABLET ORAL at 11:52

## 2021-05-17 RX ADMIN — SODIUM CHLORIDE 250 ML: 9 INJECTION, SOLUTION INTRAVENOUS at 08:00

## 2021-05-17 RX ADMIN — Medication 1 CAPSULE: at 11:52

## 2021-05-17 RX ADMIN — Medication 100 MG: at 11:52

## 2021-05-17 RX ADMIN — SODIUM CHLORIDE 300 ML: 9 INJECTION, SOLUTION INTRAVENOUS at 07:20

## 2021-05-17 RX ADMIN — EPOETIN ALFA-EPBX 5000 UNITS: 3000 INJECTION, SOLUTION INTRAVENOUS; SUBCUTANEOUS at 09:37

## 2021-05-17 RX ADMIN — CARVEDILOL 12.5 MG: 12.5 TABLET, FILM COATED ORAL at 11:51

## 2021-05-17 RX ADMIN — AMLODIPINE BESYLATE 10 MG: 10 TABLET ORAL at 11:50

## 2021-05-17 ASSESSMENT — ACTIVITIES OF DAILY LIVING (ADL)
ADLS_ACUITY_SCORE: 10

## 2021-05-17 ASSESSMENT — MIFFLIN-ST. JEOR: SCORE: 1352.09

## 2021-05-17 NOTE — PLAN OF CARE
DATE & TIME: 5/16 1900-0730  Summary:  Acute renal failure/hypertensive urgency/CAP   Cognitive Concerns/Orientation: A&Ox4, Malagasy speaking,  used via [employer].   BEHAVIOR & AGGRESSION TOOL COLOR: Green  CIWA SCORE: NA   ABNL VS/O2: VSS on RA  MOBILITY: Ind  PAIN MANAGMENT: Denies  DIET: dialysis  BOWEL/BLADDER: Continent  ABNL LAB/BG: Latent TB  DRAIN/DEVICES: CVC tunnel cath R chest for HD  TELEMETRY RHYTHM: NA  SKIN: WNL  TESTS/PROCEDURES: NA  D/C DAY/GOALS/PLACE: Discharge pending outpatient dialysis chair set up  OTHER IMPORTANT INFO: Clear lung sounds, no cough. Started treatment for latent TB

## 2021-05-17 NOTE — PROGRESS NOTES
Potassium   Date Value Ref Range Status   05/17/2021 3.5 3.4 - 5.3 mmol/L Final     Hemoglobin   Date Value Ref Range Status   05/17/2021 8.1 (L) 13.3 - 17.7 g/dL Final     Creatinine   Date Value Ref Range Status   05/17/2021 4.71 (H) 0.66 - 1.25 mg/dL Final     Urea Nitrogen   Date Value Ref Range Status   05/17/2021 32 (H) 7 - 30 mg/dL Final     Sodium   Date Value Ref Range Status   05/17/2021 137 133 - 144 mmol/L Final     INR   Date Value Ref Range Status   05/10/2021 1.02 0.86 - 1.14 Final       DIALYSIS PROCEDURE NOTE  Hepatitis status of previous patient on machine log was checked and verified ok to use with this patients hepatitis status.  Results for CORNELL CHE (MRN [id number]) as of 5/17/2021 07:23   Ref. Range 5/10/2021 11:02   Hep B Surface Agn Latest Ref Range: NR^Nonreactive  Nonreactive   Hepatitis B Surface Antibody Latest Ref Range: <8.00 m[IU]/mL 3.45   Hepatitis B Core Qi Latest Ref Range: NR^Nonreactive  Nonreactive     Patient dialyzed for 3 hrs. on a K3 bath with a net fluid removal of  3L.  A BFR of 400 ml/min was obtained via a right tunneled CVC.   The treatment plan was discussed with Dr. Weems during the treatment.    Total heparin received during the treatment: 0 units.   Line flushed, clamped and capped with heparin 1:1000 1.9 mL (1900 units) per lumen    Meds  given: Epogen 5,000 units   Complications: None  Pt complains of itching at CVC site and requested a dressing change. New dressing change due 5/24/21.    Person educated: patient. Knowledge base minimal. Barriers to learning: language. Educated on procedure and access care via verbal mode. Patient verbalized understanding. Pt prefers oral education style.     ICEBOAT? Timeout performed pre-treatment  I: Patient was identified using 2 identifiers  C:  Consent Signed Yes  E: Equipment preventative maintenance is current and dialysis delivery system OK to use  B: Hepatitis B Status: see above  O: Dialysis orders present and  complete prior to treatment  A: Vascular access verified and assessed prior to treatment  T: Treatment was performed at a clinically appropriate time  ?: Patient was allowed to ask questions and address concerns prior to treatment  See flowsheet in EPIC for further details and post assessment.  Machine water alarm in place and functioning. Transducer pods intact and checked every 15min.   Pt returned via bed to room 627-1..  Chlorine/Chloramine water system checked every 4 hours.  Outpatient Dialysis at [facility] on [facility].

## 2021-05-17 NOTE — PROGRESS NOTES
Received information from Stephany at [facility]  [phone] ext [phone]  Confirmed chair time Tues Thursday Saturday at 11 am. Message forwarded to DR Montilla  To confirm start date.  Patient anxious to leave and did not want to wait. States he will be at [facility] dialysis unit tomorrow at 11 am.  Charlotte Michaels RN  Inpatient Care Coordinator  [facility]  # [phone]

## 2021-05-17 NOTE — PLAN OF CARE
Discharge    Patient discharged to home  via  private transportation  with  brother   Care plan note done     Listed belongings gathered and returned to patient. Yes  Belongings returned to patient from security/pharmacy Yes  Care Plan and Patient education resolved: Yes  Prescriptions if needed, hard copies sent with patient  NA  Home and hospital acquired medications returned to patient: NA  Medication Bin checked and emptied on discharge Yes  Follow up appointment made for patient: Yes

## 2021-05-17 NOTE — DISCHARGE INSTRUCTIONS
Dialysis on Tuesday Thursday and Saturday at 11am     Address [facility]   [address] [place] [address]  Phone: [phone]

## 2021-05-17 NOTE — PROGRESS NOTES
"This patient was seen and examined while on dialysis. Professional oversight of the patient's dialysis care, access care and dialysis related co-morbidities were addressed as necessary with the patient and / or staff.     Access  - Rt internal jugular TDC, working well. Reports some itching and is requesting dressing change. No redness  UF Goal - 2.5 - 3 L     Early part of HD going well. Interviewed with help on . No cramps, chest pain.     Exam  BP (!) 128/94   Pulse 74   Temp 98.4  F (36.9  C) (Oral)   Resp 16   Ht 1.676 m (5' 6\")   Wt 53.4 kg (117 lb 12.8 oz)   SpO2 100%   BMI 19.01 kg/m    AAO  CTA  S1+s2  no edema  Rt internal jugular TDC     Recent Labs   Lab Test 05/17/21  0930 05/13/21  0626    135   POTASSIUM 3.5 3.7   CHLORIDE 104 100   CO2 PENDING 27   ANIONGAP PENDING 8   GLC PENDING 92   BUN PENDING 31*   CR PENDING 7.07*   BEAU PENDING 7.9*     Lab Results   Component Value Date    WBC 8.1 05/13/2021     Lab Results   Component Value Date    RBC 2.74 05/13/2021     Lab Results   Component Value Date    HGB 8.1 05/17/2021     Lab Results   Component Value Date    HCT 24.3 05/13/2021     No components found for: MCT  Lab Results   Component Value Date    MCV 89 05/13/2021     Lab Results   Component Value Date    MCH 27.7 05/13/2021     Lab Results   Component Value Date    MCHC 31.3 05/13/2021     Lab Results   Component Value Date    RDW 13.9 05/13/2021     Lab Results   Component Value Date     05/13/2021     A/P     Johnny Bingham is a 45 year old male who was admitted on 5/9/2021.      1) CKD 5/ ESRD: likely d/t uncontrolled hypertension. No s/o active GN but could possibly had had GN like IGAN in the past . Never Biopsied   Started on HD on 5/10/21    HD going okay   Vol status - appears euvolemic       2) HTN: Much better with dialysis  + carvedilol 12.5 mg BID + amlodipine 10 mg daily + lisinopril 20 mg daily.       3) Anemia:  Due to ESRD. EPO 5000 units.  ISAT " "15%.  Ferritin 472.       4) MBD:  .  Vit D 14.  On ergocalciferol.       5) Placement:   Awaiting HD unit placement. D/w  . Awaiting call back from [facility].     6) + Quantiferon Gold:  CT shows \"Scarring and calcified nodularity in the upper lung zones could be  seen with prior granulomatous infection. No CT evidence of active  tuberculosis infection.\"  He is out of respiratory isolation.    On Isoniazid for latent TB     Next HD on Wed         Kingsley Weems MD   May 17, 2021    "

## 2021-05-19 ENCOUNTER — MEDICAL CORRESPONDENCE (OUTPATIENT)
Dept: HEALTH INFORMATION MANAGEMENT | Facility: CLINIC | Age: 46
End: 2021-05-19

## 2021-05-22 NOTE — DISCHARGE SUMMARY
[facility]  Hospitalist Discharge Summary      Date of Admission:  5/9/2021  Date of Discharge:  5/17/2021  3:15 PM  Discharging Provider: Octavio Quinn DO      Discharge Diagnoses   Acute kidney injury Chronic kidney disease  Quantiferon gold positive  Hypertensive urgency    Follow-ups Needed After Discharge   Follow-up Appointments     Follow-up and recommended labs and tests       Follow up with primary care provider, Physician No Ref-Primary, within 7   days to evaluate medication change and for hospital follow- up.  No follow   up labs or test are needed. Needs follow up for continued LTBI treatment    Follow-up with hemodialysis unit    Follow-up with Infectious Disease Dr. Stewart in 10 weeks         Follow-up and recommended labs and tests       Appointment with DR Stewart 6/9 at 11 am  [address] [address]  T: [phone]    F             Unresulted Labs Ordered in the Past 30 Days of this Admission     No orders found from 4/9/2021 to 5/10/2021.      These results will be followed up by none    Discharge Disposition   Discharged to home  Condition at discharge: Stable      Hospital Course   Acute kidney injury  Chronic kidney disease  He was hospitalized here from 9/9/2020 to 9/13/2020 with hypertensive urgency and finding of chronic kidney disease stage V without clear etiology.  Nephrology evaluated the patient evaluated the patient at that time.  He was to follow-up as outpatient with nephrology, but has not yet done so.  Last creatinine here was 5.58.  Creatinine is now 12.9.  BUN elevated.  Electrolytes WNL.  Appears dry on exam. CT abdomen/pelvis does not show evidence of urinary obstruction, although reportedly had large output once Miles catheter placed.   Plan:  - continue HD via right tunneled cath  - Avoid nephrotoxins  - outpatient HD arranged     Quantiferon gold positive  [place] immigrant with no past positive or  treatment per his report  Denies weight loss, cough, hemoptysis, etc  CT of the abdomen revealed lower lung fields (done prior to transfer from [facility])  Plan  - ID consulted, isoniazide and b6 for 9 months, follow-up with them after discharge     Hypertensive urgency  Patient was admitted in September 2020 with hypertensive urgency, with symptoms of headache and lightheadedness.  He was started on amlodipine and Coreg.  Patient states he is stopped taking any blood pressure medications at home.  He does not check his blood pressures regularly.  Now presents with blood pressures up to 236/141 on arrival to the ER. Required nicardipine drip at ER, but blood pressures have improved so this is no longer needed.  Plan:  - Amlodipine 10 mg daily, carvedilol 6.25 mg BID now up to 12.5 mg BID (5/12)  - Lisinopril 10 mg daily started 5/12  - appreciate Nephrology management,, stable and prescribed on discharge     Community-acquired pneumonia, possibly  Possible infiltrate on CXR.  Patient reports productive cough initially but none now.  Afebrile.  No evidence for sepsis.  Treated with cefdinir and azithromycin in ER.  CT shows patchy bibasilar airspace opacities, favor atelectasis over pneumonia per radiology.  - completed 5 days for CAP  - Monitor for worsening signs of infection.  - Pulmonary hygiene     Cardiomegaly  Seen on imaging  - Echocardiogram -> LVSF normal, EF 55-60%, mod concLVH, grade II or mod diastolic dysfunction     Anemia of chronic disease (ESRD)  Iron sat 15%, Ferritin 472. Hgb 9 or so.  On adm 6.2 hgb.   - nephro managing - EPO given.      Physical deconditioning/generalized weakness  Likely related to renal failure with uremia, and possibly pneumonia.  - Treat the above issues, and monitor  - PT/OT to eval       Consultations This Hospital Stay   CARE MANAGEMENT / SOCIAL WORK IP CONSULT  NEPHROLOGY IP CONSULT  INFECTIOUS DISEASES IP CONSULT    Code Status   Prior    Time Spent on this Encounter   I,  Octavio Quinn DO, personally saw the patient today and spent greater than 30 minutes discharging this patient.       Octavio Quinn DO  [address] [address] [facility]  [address] [place] [address]  Phone: [phone]  ______________________________________________________________________    Physical Exam   Vital Signs:                   Weight: 117 lbs 12.8 oz  Gen: NAD, pleasant, Bulgarian speaking - jabber  used  HEENT: Normocephalic, EOMI, MMM  Resp: no crackles,  no wheezes, no increased work of resp  CV: S1S2 heard, reg rhythm, reg rate  Abdo: soft, nontender, nondistended, bowel sounds present  Ext: calves nontender, well perfused  Neuro: AAOx3, CN grossly intact, no facial asymmetry             Primary Care Physician   Physician No Ref-Primary    Discharge Orders      Reason for your hospital stay    Kidney failure  Latent tuberculosis     Activity    Your activity upon discharge: activity as tolerated     Follow-up and recommended labs and tests     Follow up with primary care provider, Physician No Ref-Primary, within 7 days to evaluate medication change and for hospital follow- up.  No follow up labs or test are needed. Needs follow up for continued LTBI treatment    Follow-up with hemodialysis unit    Follow-up with Infectious Disease Dr. Stewart in 10 weeks     Follow-up and recommended labs and tests     Appointment with DR Stewart 6/9 at 11 am  [address] [address]  T: [phone]    F     Diet    Follow this diet upon discharge: Orders Placed This Encounter      Room Service      Combination Diet Dialysis Diet       Significant Results and Procedures   Most Recent 3 CBC's:  Recent Labs   Lab Test 05/17/21  0930 05/13/21  0626 05/12/21  0607 05/11/21  0525   WBC  --  8.1 8.2 7.0   HGB 8.1* 7.6* 7.3* 7.3*   MCV  --  89 87 84   PLT  --  200 179 167     Most Recent 3 BMP's:  Recent Labs   Lab Test  05/17/21  0930 05/13/21  0626 05/12/21  0607    135 138   POTASSIUM 3.5 3.7 3.9   CHLORIDE 104 100 103   CO2 28 27 28   BUN 32* 31* 43*   CR 4.71* 7.07* 8.08*   ANIONGAP 5 8 7   BEAU 7.8* 7.9* 8.4*   GLC 95 92 103*     Most Recent 2 LFT's:  Recent Labs   Lab Test 05/12/21  0607 05/10/21  0527   AST 15 12   ALT 34 47   ALKPHOS 100 106   BILITOTAL 0.3 0.3     Most Recent 3 INR's:  Recent Labs   Lab Test 05/10/21  1102   INR 1.02   ,   Results for orders placed or performed during the hospital encounter of 05/09/21   IR CVC Tunnel Placement > 5 Yrs of Age    Narrative    DATE: 5/10/2021    PROCEDURE: TUNNELED DIALYSIS CATHETER PLACEMENT  1.  Insertion of a tunneled central venous catheter.  2.  Ultrasound guidance for vascular access. A permanent image was  stored.  3.  Fluoroscopic guidance for central venous access device placement.  4.  Moderate sedation.    INTERVENTIONAL RADIOLOGIST: Raymundo Zuniga MD    INDICATION: End-stage renal disease. The patient presents to  Interventional Radiology for placement of a tunneled hemodialysis  catheter for moderate-term central venous access for hemodialysis.    CONSENT: The risks, benefits and alternatives of tunneled hemodialysis  catheter placement were discussed with the patient  in detail. All  questions were answered. Informed consent was given to proceed with  the procedure.    MODERATE SEDATION: Versed 1.5 mg IV; Fentanyl 75 mcg IV. During the  time out, immediately prior to the administration of medications, the  patient was reassessed for adequacy to receive conscious sedation.   Under physician supervision, Versed and fentanyl were administered for  moderate sedation. Pulse oximetry, heart rate and blood pressure were  continuously monitored by an independent trained observer. The  physician spent 17 minutes of face-to-face sedation time with the  patient.    CONTRAST: None.  ANTIBIOTICS: Ancef per floor dosing  ADDITIONAL MEDICATIONS: None.    FLUOROSCOPIC  TIME: 1.1 minutes.  RADIATION DOSE: Air Kerma: 2 mGy.    COMPLICATIONS: No immediate complications.    STERILE BARRIER TECHNIQUE: Maximum sterile barrier technique was used.  Cutaneous antisepsis was performed at the operative site with  application of 2% chlorhexidine and large sterile drape. Prior to the  procedure, the  and assistant performed hand hygiene and wore  hat, mask, sterile gown, and sterile gloves during the entire  procedure.    PROCEDURE:    The Central Venous Catheter Insertion checklist was reviewed prior to  placement and followed throughout the procedure. Using local  anesthesia and real-time ultrasound guidance the right internal  jugular vein was accessed. A permanent ultrasound image was saved,  documenting patency and compressibility. A subcutaneous tunnel was  created requiring a second incision. Using this access, a 23 cm tip to  cuff 14 Fr Palindrome dialysis catheter was advanced until the tip was  in the mid/low right atrium.  The catheter was tested and found to  flush and aspirate appropriately.    FINDINGS:  Ultrasound shows an anechoic and compressible jugular vein.      Fluoroscopic spot film at completion of study show that the tunneled  dialysis catheter tip lies in the mid/low right atrium.      Impression    IMPRESSION:    1.  Successful tunneled dialysis catheter placement.  2.  The catheter is ready for use.    HAM ANAND MD   XR Chest 2 Views    Narrative    CHEST TWO VIEWS    5/13/2021 7:01 PM     HISTORY: Pneumonia, TB positive QuantiFeron but minimal respiratory  infection.    COMPARISON: None.      Impression    IMPRESSION: PA and lateral views of the chest. Lungs are clear. Heart  is normal in size. No effusions are evident. No pneumothorax. Right IJ  dual-lumen catheter terminates in the right atrium.    DEON ARREOLA MD   CT Chest w/o Contrast    Narrative    CT CHEST W/O CONTRAST 5/14/2021 9:08 AM    CLINICAL HISTORY: Respiratory illness, nondiagnostic  xray    TECHNIQUE: CT chest without IV contrast. Multiplanar reformats were  obtained. Dose reduction techniques were used.  CONTRAST: None.    COMPARISON: Chest radiograph dated 2021    FINDINGS:   LUNGS AND PLEURA: Emphysema is present. There are small pleural  effusions with atelectasis. There are multiple calcified nodules in  the right and left upper lobes which are associated with mild  scarring. There is mild scarring versus atelectasis in the left lung  base.    MEDIASTINUM/AXILLAE: Right IJ catheter. The heart is enlarged. The  ascending aorta is 4.0 cm in diameter.    UPPER ABDOMEN: No significant finding.    MUSCULOSKELETAL: Narrow AP diameter of the chest.      Impression    IMPRESSION:   1.  Scarring and calcified nodularity in the upper lung zones could be  seen with prior granulomatous infection. No CT evidence of active  tuberculosis infection.  2.  Enlarged heart.  3.  Enlarged ascending tubular aorta measuring 4.0 cm in diameter.  4.  Small pleural effusions of indeterminate etiology.    LESTER J FAHRNER, MD   Echocardiogram Complete    Narrative    [id number]  [id number]  [id number]  [id number]^^MCKAYLA^RAYSHAWN     [facility]  Echocardiography Laboratory  [address] [place] [address]     Name: CORNELL CHE  MRN: [id number]  : 1975  Study Date: 05/10/2021 08:50 AM  Age: 45 yrs  Gender: Male  Patient Location: [facility]  Reason For Study: Cardiomegaly  Ordering Physician: MCKAYLA SAMUEL  Referring Physician: AMY FOX  Performed By: Eva Kelly RDCS     BSA: 1.7 m2  Height: 66 in  Weight: 137 lb  HR: 74  BP: 178/116 mmHg  ______________________________________________________________________________  Procedure  Complete Portable Echo Adult.  ______________________________________________________________________________  Interpretation Summary     Left ventricular systolic function is normal.  The visual ejection fraction is estimated at  55-60%.  There is moderate concentric left ventricular hypertrophy.  Grade II or moderate diastolic dysfunction.  Right ventricular systolic pressure is elevated, consistent with mild to  moderate pulmonary hypertension.  The study was technically difficult. There is no comparison study available.  ______________________________________________________________________________  Left Ventricle  The left ventricle is normal in size. There is moderate concentric left  ventricular hypertrophy. Left ventricular systolic function is normal. The  visual ejection fraction is estimated at 55-60%. Grade II or moderate  diastolic dysfunction. No regional wall motion abnormalities noted.     Right Ventricle  The right ventricle is normal size. The right ventricular systolic function is  normal.     Atria  The left atrium is moderately dilated. Right atrial size is normal.     Mitral Valve  There is trace to mild mitral regurgitation.     Tricuspid Valve  There is trace tricuspid regurgitation. The right ventricular systolic  pressure is approximated at 29.8 mmHg plus the right atrial pressure. IVC  diameter >2.1 cm collapsing <50% with sniff suggests a high RA pressure  estimated at 15 mmHg or greater. Right ventricular systolic pressure is  elevated, consistent with mild to moderate pulmonary hypertension.     Aortic Valve  There is mild trileaflet aortic sclerosis. There is mild (1+) aortic  regurgitation. No aortic stenosis is present.     Pulmonic Valve  The pulmonic valve is not well visualized. There is trace to mild pulmonic  valvular regurgitation. Right ventricular diastolic pressure is approximated  at 6mmHg plus the right atrial pressure.     Vessels  Mild aortic root dilatation. The ascending aorta is Borderline dilated.     Pericardium  Trivial pericardial effusion.     Rhythm  Sinus rhythm was noted.  ______________________________________________________________________________  MMode/2D Measurements &  Calculations  IVSd: 1.1 cm     LVIDd: 4.9 cm  LVIDs: 3.6 cm  LVPWd: 1.6 cm  FS: 25.3 %  LV mass(C)d: 266.4 grams  LV mass(C)dI: 156.3 grams/m2  Ao root diam: 4.1 cm  LA dimension: 3.7 cm  asc Aorta Diam: 3.8 cm  LA/Ao: 0.92  LA Volume Indexed (AL/bp): 46.1 ml/m2  RWT: 0.68     Doppler Measurements & Calculations  MV E max anuj: 95.8 cm/sec  MV A max anuj: 64.6 cm/sec  MV E/A: 1.5  MV dec time: 0.18 sec  PA acc time: 0.19 sec  TR max P.8 mmHg  E/E' av.9  Lateral E/e': 17.9  Medial E/e': 15.8     ______________________________________________________________________________  Report approved by: Conrad Martins 05/10/2021 10:50 AM               Discharge Medications   Discharge Medication List as of 2021  2:50 PM      START taking these medications    Details   amLODIPine (NORVASC) 10 MG tablet Take 1 tablet (10 mg) by mouth daily, Disp-30 tablet, R-0, E-Prescribe      carvedilol (COREG) 12.5 MG tablet Take 1 tablet (12.5 mg) by mouth 2 times daily (with meals), Disp-60 tablet, R-0, E-Prescribe      lisinopril (ZESTRIL) 20 MG tablet Take 1 tablet (20 mg) by mouth daily, Disp-30 tablet, R-0, E-Prescribe      pyridOXINE (VITAMIN B6) 100 MG TABS Take 1 tablet (100 mg) by mouth daily, Disp-90 tablet, R-2, E-Prescribe         CONTINUE these medications which have CHANGED    Details   isoniazid (NYDRAZID) 300 MG tablet Take 1 tablet (300 mg) by mouth daily, Disp-90 tablet, R-0, E-PrescribePlease ignore the previous perscription         STOP taking these medications       Pseudoeph-Doxylamine-DM-APAP (DAYQUIL/NYQUIL COLD/FLU RELIEF OR) Comments:   Reason for Stopping:             Allergies   No Known Allergies

## 2021-06-04 ENCOUNTER — CARE COORDINATION (OUTPATIENT)
Dept: NEPHROLOGY | Facility: CLINIC | Age: 46
End: 2021-06-04

## 2021-06-04 DIAGNOSIS — N18.5 CHRONIC KIDNEY DISEASE, STAGE 5, KIDNEY FAILURE (H): Primary | ICD-10-CM

## 2021-06-04 NOTE — PROGRESS NOTES
Received call from Laura at [facility].  Per order of MD Dudley, pt needs vascular access consult and vein mapping.    Pt initiated on HD on 5/10/21 at [facility], after CVC insertion.    Started at [facility] with MD Dudley on M/W/F at 3pm.    Pt is Greek speaking and requires an  for appointments and scheduling.    Will request consult and vein mapping per MD Dudley and send to scheduling pool to schedule with .    DEXTER RUBI RN on 6/4/2021 at 1:40 PM  Dialysis Access Care Coordinator  Phone: [phone]

## 2021-06-22 ENCOUNTER — OFFICE VISIT (OUTPATIENT)
Dept: TRANSPLANT | Facility: CLINIC | Age: 46
End: 2021-06-22
Attending: SURGERY
Payer: COMMERCIAL

## 2021-06-22 ENCOUNTER — ANCILLARY PROCEDURE (OUTPATIENT)
Dept: ULTRASOUND IMAGING | Facility: CLINIC | Age: 46
End: 2021-06-22
Attending: NURSE PRACTITIONER
Payer: COMMERCIAL

## 2021-06-22 VITALS
BODY MASS INDEX: 18.61 KG/M2 | OXYGEN SATURATION: 100 % | HEART RATE: 69 BPM | DIASTOLIC BLOOD PRESSURE: 115 MMHG | WEIGHT: 115.3 LBS | SYSTOLIC BLOOD PRESSURE: 174 MMHG

## 2021-06-22 DIAGNOSIS — N18.5 CHRONIC KIDNEY DISEASE, STAGE 5, KIDNEY FAILURE (H): Primary | ICD-10-CM

## 2021-06-22 DIAGNOSIS — N18.5 CHRONIC KIDNEY DISEASE, STAGE 5, KIDNEY FAILURE (H): ICD-10-CM

## 2021-06-22 DIAGNOSIS — N18.5 CKD (CHRONIC KIDNEY DISEASE) STAGE 5, GFR LESS THAN 15 ML/MIN (H): ICD-10-CM

## 2021-06-22 LAB — RADIOLOGIST FLAGS: NORMAL

## 2021-06-22 PROCEDURE — 99204 OFFICE O/P NEW MOD 45 MIN: CPT | Performed by: SURGERY

## 2021-06-22 PROCEDURE — 93970 EXTREMITY STUDY: CPT | Mod: GC | Performed by: RADIOLOGY

## 2021-06-22 ASSESSMENT — PAIN SCALES - GENERAL: PAINLEVEL: NO PAIN (0)

## 2021-06-22 NOTE — PROGRESS NOTES
Dialysis Access Service  Consult Note    Referred by Dr. Dudley for surgical consult of permanent dialysis access.    HPI: Mr. Bingham is being seen today for placement of permanent dialysis access due to end stage renal failure from hypertension.  He is left handed for writing, right handed for everything else. Mr. Bingham is dialyzing at [facility] ([facility]) on MWF.   [address]  [place] [address].      Pt states he has tolerated dialysis well since initiation in 5/2021. He has had no issues with the CVC catheter. He overall feels ok, no complaints. Understands reasoning for appointment today.     Risk factors for vascular access:         Yes No  Hx of CVC    [x]    []   Comment: Right chest CVC in place  Hx of PICC line         []     [x]  Comment:   Hx of Pacemaker    []     [x]  Comment:   History of failed access:   []         [x]  Comment:  SVC syndrome   []      [x]  Comment:  Heart Failure    []     [x]  EF:    Periph arterial disease  []     [x]  Comment:  Prior Fracture/Surgery  []     [x]  Location:   DVT    []    [x]   Location:  Diabetes   []        [x]  Comment:  Neuropathy   [x]     []  Comment:  Numbness in hands, arms, legs, and feet- relates to position  Anticoagulation:   []    [x]  Agent:      Anticoagulation contraindication:[]  [x]     Details:                   Pediatric    []         [x]  Age:                  Hx of transplant   []    [x]  Comment:     Current immunosuppression []    [x]  Comment:            ROS: 10 point ROS neg other than the symptoms noted above in the HPI.        Past Medical History:   Diagnosis Date     CKD (chronic kidney disease) stage 5, GFR less than 15 ml/min (H)      Hypertension        Past Surgical History:   Procedure Laterality Date     IR CVC TUNNEL PLACEMENT > 5 YRS OF AGE  5/10/2021     NO HISTORY OF SURGERY         Family History   Problem Relation Age of Onset     Heart Disease Mother      Kidney Disease Father      Heart Disease  Father      Diabetes Brother      Colon Cancer No family hx of      Prostate Cancer No family hx of        Social History     Tobacco Use     Smoking status: Former Smoker     Smokeless tobacco: Never Used     Tobacco comment: quit 6 months ago -1/2020    Substance Use Topics     Alcohol use: Never     Frequency: Never         Current Outpatient Medications:      amLODIPine (NORVASC) 10 MG tablet, Take 1 tablet (10 mg) by mouth daily, Disp: 30 tablet, Rfl: 0     carvedilol (COREG) 12.5 MG tablet, Take 1 tablet (12.5 mg) by mouth 2 times daily (with meals), Disp: 60 tablet, Rfl: 0     isoniazid (NYDRAZID) 300 MG tablet, Take 1 tablet (300 mg) by mouth daily, Disp: 90 tablet, Rfl: 0     lisinopril (ZESTRIL) 20 MG tablet, Take 1 tablet (20 mg) by mouth daily, Disp: 30 tablet, Rfl: 0     pyridOXINE (VITAMIN B6) 100 MG TABS, Take 1 tablet (100 mg) by mouth daily, Disp: 90 tablet, Rfl: 2                        PHYSICAL EXAM:     Constitutional: alert and cooperative  HEENT: sclera anicteric, MMM, conjunctiva pink  Chest: HD catheter present on the right side.  CV:  NSR  : No CVA tenderness  Abdomen: Did not assess  Skin:  No rashes or jaundice  Neuro: Normal gait  Psych: normal mood and affect  EXTREMITY EXAM:   Vein Exam: Obvious suitable veins?    Left arm: YES   []           NO  [x]       Comment:    Right arm: YES   []           NO  [x]       Comment:   Arterial Exam:   Radial   L: 2+ R: 2+   Ulnar   L: 1+;R: 1+  Patent Palmar arch  L: YES   []  NO   []       R: YES   []   NO   []        Capillary refill:  L: <2sec, R:<2sec    Sensory exam:   Left hand: Normal   [x]       Abnormal   []     Comment: Occasionally numbness to FA and hand, not presently   Right hand: Normal   [x]       Abnormal   []     Comment: Occasionally numbness to FA and hand, not presently    Motor exam:   Left hand: Normal   [x]       Abnormal   []     Comment:     Right hand: Normal   [x]       Abnormal   []     Comment:                        Mapping Reviewed:  Target vein: 3.0mm left basilic vein  Target artery: brachial at the proximal upper arm    Assessment & Plan: Mr. Bingham is a good candidate for placement of permanent dialysis access. I would recommend left basilic vein transposition AV fistula creation under General. Plan for 2 stage procedure, approximately 6 weeks in between stages.     The surgical risks and benefits were reviewed and questions were answered. We discussed the day of surgery plan, anesthesia, postop care, risk of infection, numbness, injury to surrounding structures, bleeding, thrombosis, steal syndrome, possible need for future angioplasty or surgical revision, as well as nonmaturation or need for site abandonment. This was contrasted with morbidity and mortality risk of long-term catheter based hemodialysis access. The patient does wish to proceed with surgery for permanent access creation at this time. The patient was counselled to contact our nurse coordinator, Isabel Owen RN at [phone] with any questions or concerns.  Thank you for the opportunity to participate in Mr. Bingham's care.    LUIS ENRIQUE Haney CNP  06/22/21    Physician Attestation   I, Khalida Dennis MD, saw and evaluated Johnny Bingham as part of a shared visit.  I have reviewed and discussed with the advanced practice provider their history, physical and plan.    I personally reviewed the vital signs and imaging.    My key history or physical exam findings: No visible veins    Key management decisions made by me: Will plan to ultrasound in the OR.  Left side.    Khalida Dennis MD  Date of Service (when I saw the patient): 6/22/2021        Khalida Dennis MD FACS  Assistant Professor of Surgery  Director, [facility].

## 2021-06-22 NOTE — NURSING NOTE
Chief Complaint   Patient presents with     Consult     Vascular Access      Blood pressure (!) 174/115, pulse 69, weight 52.3 kg (115 lb 4.8 oz), SpO2 100 %.    Peg Marin, CMA

## 2021-06-22 NOTE — LETTER
6/22/2021         RE: Johnny Bingham  [address]  [facility] [address]        Dear Colleague,    Thank you for referring your patient, Johnny Bingham, to the [facility]. Please see a copy of my visit note below.    Dialysis Access Service  Consult Note    Referred by Dr. Dudley for surgical consult of permanent dialysis access.    HPI: Mr. Bingham is being seen today for placement of permanent dialysis access due to end stage renal failure from hypertension.  He is left handed for writing, right handed for everything else. Mr. Bingham is dialyzing at [facility] ([facility]) on MWF.   [address]  [place] [address].      Pt states he has tolerated dialysis well since initiation in 5/2021. He has had no issues with the CVC catheter. He overall feels ok, no complaints. Understands reasoning for appointment today.     Risk factors for vascular access:         Yes No  Hx of CVC    [x]    []   Comment: Right chest CVC in place  Hx of PICC line         []     [x]  Comment:   Hx of Pacemaker    []     [x]  Comment:   History of failed access:   []         [x]  Comment:  SVC syndrome   []      [x]  Comment:  Heart Failure    []     [x]  EF:    Periph arterial disease  []     [x]  Comment:  Prior Fracture/Surgery  []     [x]  Location:   DVT    []    [x]   Location:  Diabetes   []        [x]  Comment:  Neuropathy   [x]     []  Comment:  Numbness in hands, arms, legs, and feet- relates to position  Anticoagulation:   []    [x]  Agent:      Anticoagulation contraindication:[]  [x]     Details:                   Pediatric    []         [x]  Age:                  Hx of transplant   []    [x]  Comment:     Current immunosuppression []    [x]  Comment:            ROS: 10 point ROS neg other than the symptoms noted above in the HPI.        Past Medical History:   Diagnosis Date     CKD (chronic kidney disease) stage 5, GFR less than 15 ml/min (H)      Hypertension        Past  Surgical History:   Procedure Laterality Date     IR CVC TUNNEL PLACEMENT > 5 YRS OF AGE  5/10/2021     NO HISTORY OF SURGERY         Family History   Problem Relation Age of Onset     Heart Disease Mother      Kidney Disease Father      Heart Disease Father      Diabetes Brother      Colon Cancer No family hx of      Prostate Cancer No family hx of        Social History     Tobacco Use     Smoking status: Former Smoker     Smokeless tobacco: Never Used     Tobacco comment: quit 6 months ago -1/2020    Substance Use Topics     Alcohol use: Never     Frequency: Never         Current Outpatient Medications:      amLODIPine (NORVASC) 10 MG tablet, Take 1 tablet (10 mg) by mouth daily, Disp: 30 tablet, Rfl: 0     carvedilol (COREG) 12.5 MG tablet, Take 1 tablet (12.5 mg) by mouth 2 times daily (with meals), Disp: 60 tablet, Rfl: 0     isoniazid (NYDRAZID) 300 MG tablet, Take 1 tablet (300 mg) by mouth daily, Disp: 90 tablet, Rfl: 0     lisinopril (ZESTRIL) 20 MG tablet, Take 1 tablet (20 mg) by mouth daily, Disp: 30 tablet, Rfl: 0     pyridOXINE (VITAMIN B6) 100 MG TABS, Take 1 tablet (100 mg) by mouth daily, Disp: 90 tablet, Rfl: 2                        PHYSICAL EXAM:     Constitutional: alert and cooperative  HEENT: sclera anicteric, MMM, conjunctiva pink  Chest: HD catheter present on the right side.  CV:  NSR  : No CVA tenderness  Abdomen: Did not assess  Skin:  No rashes or jaundice  Neuro: Normal gait  Psych: normal mood and affect  EXTREMITY EXAM:   Vein Exam: Obvious suitable veins?    Left arm: YES   []           NO  [x]       Comment:    Right arm: YES   []           NO  [x]       Comment:   Arterial Exam:   Radial   L: 2+ R: 2+   Ulnar   L: 1+;R: 1+  Patent Palmar arch  L: YES   []  NO   []       R: YES   []   NO   []        Capillary refill:  L: <2sec, R:<2sec    Sensory exam:   Left hand: Normal   [x]       Abnormal   []     Comment: Occasionally numbness to FA and hand, not presently   Right  hand: Normal   [x]       Abnormal   []     Comment: Occasionally numbness to FA and hand, not presently    Motor exam:   Left hand: Normal   [x]       Abnormal   []     Comment:     Right hand: Normal   [x]       Abnormal   []     Comment:                       Mapping Reviewed:  Target vein: 3.0mm left basilic vein  Target artery: brachial at the proximal upper arm    Assessment & Plan: Mr. Bingham is a good candidate for placement of permanent dialysis access. I would recommend left basilic vein transposition AV fistula creation under General. Plan for 2 stage procedure, approximately 6 weeks in between stages.     The surgical risks and benefits were reviewed and questions were answered. We discussed the day of surgery plan, anesthesia, postop care, risk of infection, numbness, injury to surrounding structures, bleeding, thrombosis, steal syndrome, possible need for future angioplasty or surgical revision, as well as nonmaturation or need for site abandonment. This was contrasted with morbidity and mortality risk of long-term catheter based hemodialysis access. The patient does wish to proceed with surgery for permanent access creation at this time. The patient was counselled to contact our nurse coordinator, Isabel Owen RN at [phone] with any questions or concerns.  Thank you for the opportunity to participate in Mr. Bingham's care.    Casi Gunn, APRN CNP  06/22/21    Physician Attestation   I, Khalida Dennis MD, saw and evaluated Johnny Bingham as part of a shared visit.  I have reviewed and discussed with the advanced practice provider their history, physical and plan.    I personally reviewed the vital signs and imaging.    My key history or physical exam findings: No visible veins    Key management decisions made by me: Will plan to ultrasound in the OR.  Left side.    Khalida Dennis MD  Date of Service (when I saw the patient): 6/22/2021        Khalida Dennis MD  FACS  Assistant Professor of Surgery  Director, [facility].      Again, thank you for allowing me to participate in the care of your patient.        Sincerely,        Khalida Dennis MD, MD

## 2021-06-23 ENCOUNTER — CARE COORDINATION (OUTPATIENT)
Dept: NEPHROLOGY | Facility: CLINIC | Age: 46
End: 2021-06-23

## 2021-06-23 NOTE — PROGRESS NOTES
Pt had consult with [facility] re: vascular access for dialysis.  Pt saw Dr. Dennis and was recommended a left arm brachial transposition AVF.  Note sent to nephrologist, Dr. Dudley.  Faxed consult note to Luciano Bourne.  Asked dialysis clinic and nephrologist to let me know when pt is ready to move forward with surgery.    Will continue to follow.    DEXTER RUBI RN on 6/23/2021 at 12:41 PM  Dialysis Access Care Coordinator  Phone: [phone]

## 2021-06-24 DIAGNOSIS — E04.1 THYROID NODULE: Primary | ICD-10-CM

## 2021-06-25 ENCOUNTER — TELEPHONE (OUTPATIENT)
Dept: NEPHROLOGY | Facility: CLINIC | Age: 46
End: 2021-06-25

## 2021-06-25 ENCOUNTER — TELEPHONE (OUTPATIENT)
Dept: ENDOCRINOLOGY | Facility: CLINIC | Age: 46
End: 2021-06-25

## 2021-06-25 DIAGNOSIS — E01.0 THYROMEGALY: Primary | ICD-10-CM

## 2021-06-25 NOTE — TELEPHONE ENCOUNTER
This LPN called and scheduled U/S Thyroid ordered by Dr. Dudley. 7/01/21 at 8:30am)  Contacted pt using [facility] [employer] ([phone]). Int # [id number].    Pt stated he could not have a morning appt.  Pt requested a call back on Monday 6/28/21 with New appt time.    This LPN called Imaging, rescheduled appt to 7/01/21 at  3:00pm.    Yenni Leavitt LPN

## 2021-06-25 NOTE — TELEPHONE ENCOUNTER
Spoke to patient regarding message from Dr. Dudley:    This is a patient I follow on dialysis. I am wondering if I can ask for help. We were doing imaging of his arm vessels and incidentally he was noted to have a thyroid nodule. They are recommending that he get an ultrasound of the neck for next steps. Are you able to help me with updating the patient and getting this scheduled? I would appreciate the help if able. As a precaution for next steps, probably should just go ahead and get him set up with endocrine as well as they will be the ones to follow-up on the ultrasound findings/singificance. I will place that referral if you can help me with relaying message to [employer].     Let me know if issues.   Micheline    He verbalized understanding. He is agreeable to ultrasound and endocrine appt. He would appreciate appointments on Tuesdays and Thursdays as he has dialysis MWF starting at 3 pm.      Cherie Alejandre, RN, BSN  Nephrology Care Coordinator  [facility]

## 2021-06-25 NOTE — TELEPHONE ENCOUNTER
[facility] Call Center    Phone Message    May a detailed message be left on voicemail: yes     Reason for Call: Appointment Intake    Referring Provider Name: STAN Dudley  Diagnosis and/or Symptoms: Thyroid Nodules/Goiter- pt currently scheduled at [facility] with Dr Galo on 9/15; no openings within 2 week timeframe. Please call pt with Tongan  to discuss, thanks! (Pt prefers [facility] location)    Action Taken: Message routed to:  [facility] Endocrine    Travel Screening: Not Applicable

## 2021-06-29 NOTE — TELEPHONE ENCOUNTER
Endocrine triage  The scheduled first available endocrine appointment timeframe is acceptable  Divya Ha MD

## 2021-07-21 PROBLEM — N18.5 CKD (CHRONIC KIDNEY DISEASE) STAGE 5, GFR LESS THAN 15 ML/MIN (H): Status: ACTIVE | Noted: 2021-07-21

## 2021-07-30 ENCOUNTER — OFFICE VISIT (OUTPATIENT)
Dept: FAMILY MEDICINE | Facility: CLINIC | Age: 46
End: 2021-07-30
Payer: COMMERCIAL

## 2021-07-30 ENCOUNTER — TELEPHONE (OUTPATIENT)
Dept: NEUROSURGERY | Facility: CLINIC | Age: 46
End: 2021-07-30

## 2021-07-30 ENCOUNTER — TELEPHONE (OUTPATIENT)
Dept: FAMILY MEDICINE | Facility: CLINIC | Age: 46
End: 2021-07-30

## 2021-07-30 ENCOUNTER — TELEPHONE (OUTPATIENT)
Dept: NEUROLOGY | Facility: CLINIC | Age: 46
End: 2021-07-30

## 2021-07-30 VITALS
BODY MASS INDEX: 18.72 KG/M2 | OXYGEN SATURATION: 99 % | TEMPERATURE: 97.3 F | RESPIRATION RATE: 14 BRPM | DIASTOLIC BLOOD PRESSURE: 64 MMHG | HEART RATE: 73 BPM | WEIGHT: 116 LBS | SYSTOLIC BLOOD PRESSURE: 126 MMHG

## 2021-07-30 DIAGNOSIS — Z99.2 ESRD (END STAGE RENAL DISEASE) ON DIALYSIS (H): ICD-10-CM

## 2021-07-30 DIAGNOSIS — N18.6 ESRD (END STAGE RENAL DISEASE) ON DIALYSIS (H): ICD-10-CM

## 2021-07-30 DIAGNOSIS — D64.9 ANEMIA, UNSPECIFIED TYPE: ICD-10-CM

## 2021-07-30 DIAGNOSIS — S06.5XAA SUBDURAL HEMATOMA (H): Primary | ICD-10-CM

## 2021-07-30 DIAGNOSIS — G40.909: ICD-10-CM

## 2021-07-30 LAB
ALBUMIN SERPL-MCNC: 2.9 G/DL (ref 3.4–5)
ALP SERPL-CCNC: 68 U/L (ref 40–150)
ALT SERPL W P-5'-P-CCNC: 15 U/L (ref 0–70)
ANION GAP SERPL CALCULATED.3IONS-SCNC: 8 MMOL/L (ref 3–14)
AST SERPL W P-5'-P-CCNC: 11 U/L (ref 0–45)
BASOPHILS # BLD AUTO: 0 10E3/UL (ref 0–0.2)
BASOPHILS NFR BLD AUTO: 1 %
BILIRUB SERPL-MCNC: 0.2 MG/DL (ref 0.2–1.3)
BUN SERPL-MCNC: 64 MG/DL (ref 7–30)
CALCIUM SERPL-MCNC: 8.8 MG/DL (ref 8.5–10.1)
CHLORIDE BLD-SCNC: 104 MMOL/L (ref 94–109)
CO2 SERPL-SCNC: 25 MMOL/L (ref 20–32)
CREAT SERPL-MCNC: 8.66 MG/DL (ref 0.66–1.25)
EOSINOPHIL # BLD AUTO: 0.4 10E3/UL (ref 0–0.7)
EOSINOPHIL NFR BLD AUTO: 5 %
ERYTHROCYTE [DISTWIDTH] IN BLOOD BY AUTOMATED COUNT: 14.7 % (ref 10–15)
FERRITIN SERPL-MCNC: 431 NG/ML (ref 26–388)
GFR SERPL CREATININE-BSD FRML MDRD: 7 ML/MIN/1.73M2
GLUCOSE BLD-MCNC: 87 MG/DL (ref 70–99)
HCT VFR BLD AUTO: 28.4 % (ref 40–53)
HGB BLD-MCNC: 8.7 G/DL (ref 13.3–17.7)
IRON SATN MFR SERPL: 20 % (ref 15–46)
IRON SERPL-MCNC: 54 UG/DL (ref 35–180)
LYMPHOCYTES # BLD AUTO: 1.4 10E3/UL (ref 0.8–5.3)
LYMPHOCYTES NFR BLD AUTO: 20 %
MCH RBC QN AUTO: 26.8 PG (ref 26.5–33)
MCHC RBC AUTO-ENTMCNC: 30.6 G/DL (ref 31.5–36.5)
MCV RBC AUTO: 87 FL (ref 78–100)
MONOCYTES # BLD AUTO: 0.7 10E3/UL (ref 0–1.3)
MONOCYTES NFR BLD AUTO: 10 %
NEUTROPHILS # BLD AUTO: 4.5 10E3/UL (ref 1.6–8.3)
NEUTROPHILS NFR BLD AUTO: 64 %
PLATELET # BLD AUTO: 247 10E3/UL (ref 150–450)
POTASSIUM BLD-SCNC: 4.8 MMOL/L (ref 3.4–5.3)
PROT SERPL-MCNC: 7.9 G/DL (ref 6.8–8.8)
RBC # BLD AUTO: 3.25 10E6/UL (ref 4.4–5.9)
SODIUM SERPL-SCNC: 137 MMOL/L (ref 133–144)
TIBC SERPL-MCNC: 273 UG/DL (ref 240–430)
WBC # BLD AUTO: 6.9 10E3/UL (ref 4–11)

## 2021-07-30 PROCEDURE — 83550 IRON BINDING TEST: CPT | Performed by: PHYSICIAN ASSISTANT

## 2021-07-30 PROCEDURE — 80053 COMPREHEN METABOLIC PANEL: CPT | Performed by: PHYSICIAN ASSISTANT

## 2021-07-30 PROCEDURE — 99214 OFFICE O/P EST MOD 30 MIN: CPT | Performed by: PHYSICIAN ASSISTANT

## 2021-07-30 PROCEDURE — 80177 DRUG SCRN QUAN LEVETIRACETAM: CPT | Performed by: PHYSICIAN ASSISTANT

## 2021-07-30 PROCEDURE — 85025 COMPLETE CBC W/AUTO DIFF WBC: CPT | Performed by: PHYSICIAN ASSISTANT

## 2021-07-30 PROCEDURE — 36415 COLL VENOUS BLD VENIPUNCTURE: CPT | Performed by: PHYSICIAN ASSISTANT

## 2021-07-30 PROCEDURE — 82728 ASSAY OF FERRITIN: CPT | Performed by: PHYSICIAN ASSISTANT

## 2021-07-30 RX ORDER — NICOTINE 21 MG/24HR
1 PATCH, TRANSDERMAL 24 HOURS TRANSDERMAL
COMMUNITY
Start: 2021-07-21 | End: 2021-09-10

## 2021-07-30 RX ORDER — AMOXICILLIN 250 MG
1-2 CAPSULE ORAL
COMMUNITY
Start: 2021-07-21 | End: 2021-09-10

## 2021-07-30 RX ORDER — CALCIUM ACETATE 667 MG/1
2001 CAPSULE ORAL
Status: ON HOLD | COMMUNITY
Start: 2021-07-21 | End: 2022-02-14

## 2021-07-30 RX ORDER — ACETAMINOPHEN 500 MG
1000 TABLET ORAL
COMMUNITY
Start: 2021-07-21 | End: 2022-04-14

## 2021-07-30 RX ORDER — LEVETIRACETAM 1000 MG/1
1000 TABLET ORAL
COMMUNITY
Start: 2021-07-21 | End: 2021-09-02

## 2021-07-30 NOTE — TELEPHONE ENCOUNTER
I called patient using [employer]  to reschedule an appt that was scheduled incorrectly. Pt was scheduled with Joleen Meadows NP and they need to be scheduled with our seizure specialist.  Pt wanted in clinic appt and did not want to wait to see Dr. Montero. Pt was transferred with  to [facility]  Phone: [phone]    Swati Pratt LPN

## 2021-07-30 NOTE — TELEPHONE ENCOUNTER
Able to give message below, as written by provider. Patient states he had been on dialysis for several months. I reiterated the fact that creatinine was double what is was 2 months ago and in a critical level, he wants message sent back to the provider to ensure provider is aware of dialysis     Call Johnny back with  [phone] Ok to leave detailed message in Turkish     Susan Jeannette Argueta RN, BSN, CMSRN  [facility]

## 2021-07-30 NOTE — TELEPHONE ENCOUNTER
[facility] Call Center    Phone Message    May a detailed message be left on voicemail: yes     Reason for Call: Patient called wanting to schedule for Subdural hematoma (H) [S06.5X9A]. It is not listed on protocols. Please advise. Thank you.    Action Taken: Message routed to:  Adult Clinics: Neurology p [id number]    Travel Screening: Not Applicable

## 2021-07-30 NOTE — TELEPHONE ENCOUNTER
Donna from [facility] lab calling with critical lab result  Creatinine = 8.66      To provider to advise    Casi MARTINEZN, RN

## 2021-07-30 NOTE — TELEPHONE ENCOUNTER
If he Is he is  still on dialysis doesn't need to go to emergency department - if not likely needs to restart- critical level today and worse than upon discharge from hospital stay -does he have a nephrologist to talk to ??  8.66 creatinine today

## 2021-07-30 NOTE — LETTER
August 3, 2021      Johnny Bingham  [address]  [place] [address]        Dear ,    Your kidney function is declined from 2 months ago.   Your long term iron stores are a bit high.   Your blood counts are comparable to previous.   Please call or MyChart my office with any questions or concerns.       Maddi Nation, PAC        Resulted Orders   Comprehensive metabolic panel (BMP + Alb, Alk Phos, ALT, AST, Total. Bili, TP)   Result Value Ref Range    Sodium 137 133 - 144 mmol/L    Potassium 4.8 3.4 - 5.3 mmol/L    Chloride 104 94 - 109 mmol/L    Carbon Dioxide (CO2) 25 20 - 32 mmol/L    Anion Gap 8 3 - 14 mmol/L    Urea Nitrogen 64 (H) 7 - 30 mg/dL    Creatinine 8.66 (HH) 0.66 - 1.25 mg/dL    Calcium 8.8 8.5 - 10.1 mg/dL    Glucose 87 70 - 99 mg/dL    Alkaline Phosphatase 68 40 - 150 U/L    AST 11 0 - 45 U/L    ALT 15 0 - 70 U/L    Protein Total 7.9 6.8 - 8.8 g/dL    Albumin 2.9 (L) 3.4 - 5.0 g/dL    Bilirubin Total 0.2 0.2 - 1.3 mg/dL    GFR Estimate 7 (L) >60 mL/min/1.73m2      Comment:      As of July 11, 2021, eGFR is calculated by the CKD-EPI creatinine equation, without race adjustment. eGFR can be influenced by muscle mass, exercise, and diet. The reported eGFR is an estimation only and is only applicable if the renal function is stable.   CBC with platelets and differential   Result Value Ref Range    WBC Count 6.9 4.0 - 11.0 10e3/uL    RBC Count 3.25 (L) 4.40 - 5.90 10e6/uL    Hemoglobin 8.7 (L) 13.3 - 17.7 g/dL    Hematocrit 28.4 (L) 40.0 - 53.0 %    MCV 87 78 - 100 fL    MCH 26.8 26.5 - 33.0 pg    MCHC 30.6 (L) 31.5 - 36.5 g/dL    RDW 14.7 10.0 - 15.0 %    Platelet Count 247 150 - 450 10e3/uL    % Neutrophils 64 %    % Lymphocytes 20 %    % Monocytes 10 %    % Eosinophils 5 %    % Basophils 1 %    Absolute Neutrophils 4.5 1.6 - 8.3 10e3/uL    Absolute Lymphocytes 1.4 0.8 - 5.3 10e3/uL    Absolute Monocytes 0.7 0.0 - 1.3 10e3/uL    Absolute Eosinophils 0.4 0.0 - 0.7 10e3/uL    Absolute  Basophils 0.0 0.0 - 0.2 10e3/uL   Ferritin   Result Value Ref Range    Ferritin 431 (H) 26 - 388 ng/mL   Iron and iron binding capacity   Result Value Ref Range    Iron 54 35 - 180 ug/dL    Iron Binding Capacity 273 240 - 430 ug/dL    Iron Sat Index 20 15 - 46 %

## 2021-07-30 NOTE — TELEPHONE ENCOUNTER
Able to give message below, as written by provider, with Congolese . Ric states yes, he is on dialysis MWF and he will let his nephrology team know about Creatinine, Ric states he has means of contacting their office to let them know of Creatinine 8.66 in case they need to adjust his plan of care, Ric verbalized understanding of importance to contact, denied any new symptoms or concerns at this time.    Susan Argueta RN, BSN, CMSRN  [facility]

## 2021-07-30 NOTE — TELEPHONE ENCOUNTER
Please call and advise that kidney function has worsened since hospital stay- recommend emergency department evaluation- preferably [facility] but I understand if wants to go to [facility]  Since hospitalized there earlier this month

## 2021-07-30 NOTE — PATIENT INSTRUCTIONS
Call [facility]; [phone] for home care      Follow up with neurology and neurosurgery at [facility] ([phone]) formerly called [facility] as soon as possible     I will send letter if lab results normal and call with abnormal results.     Care coordinator should be reaching out to you for physical therapy and speech therapy    Set up appointment with me in 2 weeks and bring in pill bottles with you.

## 2021-07-30 NOTE — TELEPHONE ENCOUNTER
Pt advised to follow-up with [facility] neurosurgery team instead of  neurosurgery as this team performed his procedures. Pt states he does have the number for the neurosurgery team's office to call and set up his follow-up appointment. Pt states he was told to call and set up his neurology and neurosurgery appts and is still waiting to hear about the neurology appt. Informed that someone from scheduling should be in touch to schedule his neurology appointment. Pt verbalized understanding.       Lakeisha Walker, RNCC  Neurology

## 2021-07-30 NOTE — PROGRESS NOTES
Assessment & Plan     Subdural hematoma (H)  Required craniotomy- has no follow up with neurology or neurosurgery scheduled.   hgb comparable to discharge - follow up with speech and physical therapy   Unable to reconcile medications- doesn't have list, medications or know names of medications that he is taking  - Neurosurgery Referral  - CBC with platelets and differential  - Comprehensive metabolic panel (BMP + Alb, Alk Phos, ALT, AST, Total. Bili, TP)  - Care Coordination Referral  - Comprehensive metabolic panel (BMP + Alb, Alk Phos, ALT, AST, Total. Bili, TP)  - CBC with platelets and differential  - Physical Therapy Referral  - Speech Therapy Referral    Secondary seizure disorder (H)  Related to subdural hematoma- on keppra - follow up with neurology  - Adult Neurology Referral  - CBC with platelets and differential  - Comprehensive metabolic panel (BMP + Alb, Alk Phos, ALT, AST, Total. Bili, TP)  - Care Coordination Referral  - Keppra (Levetiracetam) Level  - Keppra (Levetiracetam) Level  - Comprehensive metabolic panel (BMP + Alb, Alk Phos, ALT, AST, Total. Bili, TP)  - CBC with platelets and differential  - Physical Therapy Referral  - Speech Therapy Referral    Anemia, unspecified type    - Iron and iron binding capacity  - Ferritin  - Ferritin  - Iron and iron binding capacity    ESRD (end stage renal disease) on dialysis (H)  On dialysis - creat worse since discharge but normal electrolytes       [facility]  Review of external notes as documented elsewhere in note  Ordering of each unique test         Patient Instructions   Call [facility]; [phone] for home care      Follow up with neurology and neurosurgery at [facility] ([phone]) formerly called [facility] as soon as possible     I will send letter if lab results normal and call with  abnormal results.     Care coordinator should be reaching out to you for physical therapy and speech therapy    Set up appointment with me in 2 weeks and bring in pill bottles with you.             Return in about 2 weeks (around 8/13/2021), or if symptoms worsen or fail to improve, for in person.    Maddi Nation PA-C  [facility] ALLISON Turner is a 45 year old who presents for the following health issues  accompanied by his  over the phone:    [facility]       Hospital Follow-up Visit:    Hospital/Nursing Home/IP Rehab Facility: [facility]  Date of Admission: 07/14/2021  Date of Discharge: 07/22/2021  Reason(s) for Admission: stroke, subdural hematoma      Was your hospitalization related to COVID-19? No   Problems taking medications regularly:  None  Medication changes since discharge: None-pt is not sure what medications he is taking  Problems adhering to non-medication therapy:  None    Summary of hospitalization:  CareEverywhere information obtained and reviewed  Diagnostic Tests/Treatments reviewed.  Follow up needed: neurosurgery and neurology- appointments not scheduled yet - home care was ordered but not had home care come in   Other Healthcare Providers Involved in Patient s Care:         None- has nephrology outside of [facility]   Update since discharge: improved. Post Discharge Medication Reconciliation: unable to reconcile discharge medications due to pt doesn't have any idea what he is taking .  Plan of care communicated with patient        I am ok. Patient not none to me- history of nontraumatic subdura hematoma requiring craniotomy - complains of numbness of head at surgical site- Touch doesn't feel at all  No headache.  No nausea or vomiting.   Physical therapy in hospital but no physical therapy since discharged  I live with my family  Using normal.   No neurologist   Discharged on Thursday last week  I smoke a litle bit- 3-4 cigarettes per day  According to  discharge summary recommended Neurosurgery appointment in 4 weeks with repeat CT and also neurology to follow up since on keppra- had a secondary seizure  No home care  Taking 6-7 kinds of medications - doesn't know any names of medications taking   Other than nurmbness. - no concerns for me today    I dont' know names of medication taking         Review of Systems   Constitutional, HEENT, cardiovascular, pulmonary, gi and gu systems are negative, except as otherwise noted.      Objective    /64   Pulse 73   Temp 97.3  F (36.3  C) (Tympanic)   Resp 14   Wt 52.6 kg (116 lb)   SpO2 99%   BMI 18.72 kg/m    Body mass index is 18.72 kg/m .  Physical Exam   GENERAL: healthy, alert and no distress  Large healing incision on top of scalp without erythema, edema, redness- has lack of sensation  NECK: no adenopathy, no asymmetry, masses, or scars and thyroid normal to palpation  RESP: lungs clear to auscultation - no rales, rhonchi or wheezes  CV: regular rate and rhythm, normal S1 S2, no S3 or S4, no murmur, click or rub, no peripheral edema and peripheral pulses strong  ABDOMEN: soft, nontender, no hepatosplenomegaly, no masses and bowel sounds normal  MS: no gross musculoskeletal defects noted, no edema  NEURO: Normal strength and tone, mentation intact and speech normal, CN 2-12 intact, strength +5/5 bilateral upper and lower extremities. Negative rhomberg, normal gait     Results for orders placed or performed in visit on 07/30/21   Keppra (Levetiracetam) Level     Status: None   Result Value Ref Range    Keppra (Levetiracetam) Level 21 12 - 46 ug/mL    Narrative    Performed By: [facility]  [address] [place] [id number]  : Marcela Meadows MD   Comprehensive metabolic panel (BMP + Alb, Alk Phos, ALT, AST, Total. Bili, TP)     Status: Abnormal   Result Value Ref Range    Sodium 137 133 - 144 mmol/L    Potassium 4.8 3.4 - 5.3 mmol/L    Chloride 104 94 - 109 mmol/L     Carbon Dioxide (CO2) 25 20 - 32 mmol/L    Anion Gap 8 3 - 14 mmol/L    Urea Nitrogen 64 (H) 7 - 30 mg/dL    Creatinine 8.66 (HH) 0.66 - 1.25 mg/dL    Calcium 8.8 8.5 - 10.1 mg/dL    Glucose 87 70 - 99 mg/dL    Alkaline Phosphatase 68 40 - 150 U/L    AST 11 0 - 45 U/L    ALT 15 0 - 70 U/L    Protein Total 7.9 6.8 - 8.8 g/dL    Albumin 2.9 (L) 3.4 - 5.0 g/dL    Bilirubin Total 0.2 0.2 - 1.3 mg/dL    GFR Estimate 7 (L) >60 mL/min/1.73m2   CBC with platelets and differential     Status: Abnormal   Result Value Ref Range    WBC Count 6.9 4.0 - 11.0 10e3/uL    RBC Count 3.25 (L) 4.40 - 5.90 10e6/uL    Hemoglobin 8.7 (L) 13.3 - 17.7 g/dL    Hematocrit 28.4 (L) 40.0 - 53.0 %    MCV 87 78 - 100 fL    MCH 26.8 26.5 - 33.0 pg    MCHC 30.6 (L) 31.5 - 36.5 g/dL    RDW 14.7 10.0 - 15.0 %    Platelet Count 247 150 - 450 10e3/uL    % Neutrophils 64 %    % Lymphocytes 20 %    % Monocytes 10 %    % Eosinophils 5 %    % Basophils 1 %    Absolute Neutrophils 4.5 1.6 - 8.3 10e3/uL    Absolute Lymphocytes 1.4 0.8 - 5.3 10e3/uL    Absolute Monocytes 0.7 0.0 - 1.3 10e3/uL    Absolute Eosinophils 0.4 0.0 - 0.7 10e3/uL    Absolute Basophils 0.0 0.0 - 0.2 10e3/uL   Ferritin     Status: Abnormal   Result Value Ref Range    Ferritin 431 (H) 26 - 388 ng/mL   Iron and iron binding capacity     Status: Normal   Result Value Ref Range    Iron 54 35 - 180 ug/dL    Iron Binding Capacity 273 240 - 430 ug/dL    Iron Sat Index 20 15 - 46 %   CBC with platelets and differential     Status: Abnormal    Narrative    The following orders were created for panel order CBC with platelets and differential.  Procedure                               Abnormality         Status                     ---------                               -----------         ------                     CBC with platelets and d...[[id number]]  Abnormal            Final result                 Please view results for these tests on the individual orders.

## 2021-07-31 LAB — LEVETIRACETAM SERPL-MCNC: 21 UG/ML

## 2021-07-31 NOTE — RESULT ENCOUNTER NOTE
Is on dialysis- please send letter with lab results     Dear Johnny  Your kidney function is declined from 2 months ago.  Your long term iron stores are a bit high.   Your blood counts are comparable to previous.  Please call or MyChart my office with any questions or concerns.   Maddi Nation, PAC

## 2021-08-02 ENCOUNTER — PATIENT OUTREACH (OUTPATIENT)
Dept: NURSING | Facility: CLINIC | Age: 46
End: 2021-08-02

## 2021-08-02 NOTE — LETTER
[facility] CARE COORDINATION  [address] [facility] [id number]    August 2, 2021    Johnny Denis Bingham  [address]  [facility] [address]      Dear Johnny,    I am a clinic community health worker who works with [facility] at [facility]. I wanted to thank you for spending the time to talk with me.  Below is a description of clinic care coordination and how I can further assist you.      The clinic care coordination team is made up of a registered nurse,  and community health worker who understand the health care system. The goal of clinic care coordination is to help you manage your health and improve access to the health care system in the most efficient manner. The team can assist you in meeting your health care goals by providing education, coordinating services, strengthening the communication among your providers and supporting you with any resource needs.    Please feel free to contact me at [phone] with any questions or concerns. We are focused on providing you with the highest-quality healthcare experience possible and that all starts with you.     Sincerely,     Elida VAQSUEZ, Community Health Worker  Clinic Care Coordination  [facility] : [place], [place] & [place]  Phone: [phone]

## 2021-08-02 NOTE — TELEPHONE ENCOUNTER
[facility] Call Center    Phone Message    May a detailed message be left on voicemail: yes     Reason for Call: Appointment Intake    Referring Provider Name: Maddi Nation PA-C in [facility] FM/IM/PEDS  Diagnosis and/or Symptoms: Subdural hematoma (H) [S06.5X9A]    Patients Niece calling to schedule an appointment with Neurosurgery for patient. States that they are interested in going to [facility] - writer advised of notes from [place]isha about scheduling with [facility] and Niece states that she will call [facility] to schedule.     Please advise     Action Taken: Other: [facility]     Travel Screening: Not Applicable

## 2021-08-02 NOTE — PROGRESS NOTES
Clinic Care Coordination Contact  Community Health Worker Initial Outreach       CHW Additional Questions  If ED/Hospital discharge, follow-up appointment scheduled as recommended?: Yes  Medication changes made following ED/Hospital discharge?: No  MyChart active?: No    Patient accepts CC: No, Patient declined CCC. Patient will be sent Care Coordination introduction letter for future reference.      [facility]: Post-Discharge Note  SITUATION                                                      Admission:    Admission Date: 07/14/21   Reason for Admission: Subdural hematoma (HCC)  Discharge:   Discharge Date: 07/22/21  Discharge Plan: Recommend direct assistance with IADLs requiring higher level cognition, including medication set-up, finances, shopping, stovetop meal prep, and driving. From an OT standpoint he is safe to discharge home with intermittent supervision from family and home care OT    BACKGROUND                                                      Reason for Admission: Subdural hematoma (HCC)    ASSESSMENT      Discharge Assessment  How are you doing now that you are home?: Good  Do you feel your condition is stable enough to be safe at home until your provider visit?: Yes  Does the patient have their discharge instructions? : Yes  Were you started on any new medications or were there changes to any of your previous medications? : No  Does the patient have all of their medications?: Yes  Do you have questions regarding any of your medications? : No  Do you have all of your needed medical supplies or equipment (DME)?  (i.e. oxygen tank, CPAP, cane, etc.): Yes  Discharge follow-up appointment scheduled within 14 calendar days? : Yes  Discharge Follow Up Appointment Date: 07/30/21  Discharge Follow Up Appointment Scheduled with?: Primary Care Provider         Care Management   Community Health Worker Initial Outreach       CHW Additional Questions  If ED/Hospital discharge, follow-up appointment  scheduled as recommended?: Yes  Medication changes made following ED/Hospital discharge?: No  MyChart active?: No    Patient accepts CC: No, Patient declined CC. Patient will be sent Care Coordination introduction letter for future reference.     Elida VASQUEZ Community Health Worker  Clinic Care Coordination  [facility] : [place], [place] & [place]  Phone: [phone]    PLAN                                                      Outpatient Plan: Patient was discharged home.    Future Appointments   Date Time Provider Department Center   8/9/2021  1:30 PM Joleen Meadows APRN [facility] [place]   8/13/2021  7:20 AM Maddi Nation PA-C [facility]   9/2/2021 12:00 PM Deuce Yun MD [facility]   9/15/2021  1:30 PM [facility]         For any urgent concerns, please contact our 24 hour nurse triage line: [phone] ([address] [place] [address])

## 2021-08-13 ENCOUNTER — OFFICE VISIT (OUTPATIENT)
Dept: FAMILY MEDICINE | Facility: CLINIC | Age: 46
End: 2021-08-13
Payer: COMMERCIAL

## 2021-08-13 ENCOUNTER — APPOINTMENT (OUTPATIENT)
Dept: INTERPRETER SERVICES | Facility: CLINIC | Age: 46
End: 2021-08-13
Payer: COMMERCIAL

## 2021-08-13 VITALS
TEMPERATURE: 97.5 F | WEIGHT: 112 LBS | RESPIRATION RATE: 14 BRPM | DIASTOLIC BLOOD PRESSURE: 100 MMHG | HEART RATE: 71 BPM | OXYGEN SATURATION: 100 % | SYSTOLIC BLOOD PRESSURE: 140 MMHG | BODY MASS INDEX: 18.08 KG/M2

## 2021-08-13 DIAGNOSIS — N17.9 ACUTE RENAL FAILURE, UNSPECIFIED ACUTE RENAL FAILURE TYPE (H): ICD-10-CM

## 2021-08-13 DIAGNOSIS — S06.5XAA SUBDURAL HEMATOMA (H): Primary | ICD-10-CM

## 2021-08-13 DIAGNOSIS — I10 HYPERTENSION GOAL BP (BLOOD PRESSURE) < 140/90: ICD-10-CM

## 2021-08-13 DIAGNOSIS — N18.6 ESRD (END STAGE RENAL DISEASE) ON DIALYSIS (H): ICD-10-CM

## 2021-08-13 DIAGNOSIS — Z99.2 ESRD (END STAGE RENAL DISEASE) ON DIALYSIS (H): ICD-10-CM

## 2021-08-13 DIAGNOSIS — Z98.890 H/O CRANIOTOMY: ICD-10-CM

## 2021-08-13 PROCEDURE — 99214 OFFICE O/P EST MOD 30 MIN: CPT | Performed by: PHYSICIAN ASSISTANT

## 2021-08-13 RX ORDER — LISINOPRIL 20 MG/1
20 TABLET ORAL DAILY
Qty: 30 TABLET | Refills: 0 | Status: SHIPPED | OUTPATIENT
Start: 2021-08-13 | End: 2021-09-10

## 2021-08-13 RX ORDER — CARVEDILOL 3.12 MG/1
6.25 TABLET ORAL 2 TIMES DAILY WITH MEALS
Qty: 120 TABLET | Refills: 0 | Status: SHIPPED | OUTPATIENT
Start: 2021-08-13 | End: 2021-09-10

## 2021-08-13 NOTE — LETTER
August 13, 2021      Johnny Bingham  [address]  [facility] [address]        To Whom It May Concern,     Johnny has suffered a bleed in head and unable to work until further notice       Sincerely,        Maddi Nation PA-C

## 2021-08-13 NOTE — PROGRESS NOTES
Assessment & Plan     Subdural hematoma (H)  Needs follow up with neurosurgery- at [facility]  - Care Coordination Referral    H/O craniotomy  As above   - Care Coordination Referral    Acute renal failure, unspecified acute renal failure type (H)  According to discharge instructions from hospital last month was on lisinopril 10 mg daily- increase to 20 mg daily and follow up with us in one month  According to discharge summary supposed to be taking 6.25 mg coreg twice a day   - lisinopril (ZESTRIL) 20 MG tablet  Dispense: 30 tablet; Refill: 0  - carvedilol (COREG) 3.125 MG tablet  Dispense: 120 tablet; Refill: 0    Hypertension goal BP (blood pressure) < 140/90  Blood pressure not at goal. Increase lisinopril from 10 mg daily to 20mg daily and follow up with us in one month  - lisinopril (ZESTRIL) 20 MG tablet  Dispense: 30 tablet; Refill: 0  - carvedilol (COREG) 3.125 MG tablet  Dispense: 120 tablet; Refill: 0    ESRD (end stage renal disease) on dialysis (H)    - Care Coordination Referral      Review of prior external note(s) from - Outside records from [facility]  Prescription drug management         Tobacco Cessation:   reports that he has been smoking cigarettes. He has been smoking about 0.25 packs per day. He has never used smokeless tobacco.  Tobacco Cessation Action Plan: Information offered: Patient not interested at this time    Patient Instructions   Take carvediol 3.25 tablet -2 tablets twice a day twice a day with meals   Increase lisinopril from 10 mg daily to 20 mg daily  Follow up with us in clinic in one month  Continue other medications as you have been taking  Care coordinator will be reaching out to help set up follow up with neurosurgery and neurology          Return in about 4 weeks (around 9/10/2021), or if symptoms worsen or fail to improve, for in person.    Maddi Nation PA-C  [facility]    Veronica Varelah is a 45 year old who presents for  the following health issues  accompanied by his phone :    HPI     Hypertension Follow-up      Do you check your blood pressure regularly outside of the clinic? Yes     Are you following a low salt diet? No    Are your blood pressures ever more than 140 on the top number (systolic) OR more   than 90 on the bottom number (diastolic), for example 140/90? Yes      How many servings of fruits and vegetables do you eat daily?  0-1    On average, how many sweetened beverages do you drink each day (Examples: soda, juice, sweet tea, etc.  Do NOT count diet or artificially sweetened beverages)?   0    How many days per week do you exercise enough to make your heart beat faster? 7    How many minutes a day do you exercise enough to make your heart beat faster? 20 - 29    How many days per week do you miss taking your medication? 0    Recent nontraumatic subdural hematoma requiring craniotomy and history of secondary seizure- has upcoming appointment with neurology but has not yet seen neurosurgery for follow up - surgery performed at [facility]  Asked to bring in medications with him today because unable to reconcile medications at last visit (he did not know what he was taking and did not have pill bottles with him)  He has only been taking 3.25 mg carvediol twice a day instead of 6.25 mg twice a day   [place] in [employer] [place]- needs letter to excuse from work   Has amoldipine at home but has not been taking  taking 10 mg lisinopril daily  Is on dialysis for end stage renal disease      Review of Systems   Constitutional, HEENT, cardiovascular, pulmonary, gi and gu systems are negative, except as otherwise noted.      Objective    BP (!) 142/90   Pulse 71   Temp 97.5  F (36.4  C) (Tympanic)   Resp 14   Wt 50.8 kg (112 lb)   SpO2 100%   BMI 18.08 kg/m    Body mass index is 18.08 kg/m .  Physical Exam   GENERAL: healthy, alert and no distress  EYES: Eyes grossly normal to inspection, PERRL and  conjunctivae and sclerae normal  HENT: ear canals and TM's normal, nose and mouth without ulcers or lesions  NECK: no adenopathy, no asymmetry, masses, or scars and thyroid normal to palpation  RESP: lungs clear to auscultation - no rales, rhonchi or wheezes  CV: regular rate and rhythm, normal S1 S2, no S3 or S4, no murmur, click or rub, no peripheral edema and peripheral pulses strong  ABDOMEN: soft, nontender, no hepatosplenomegaly, no masses and bowel sounds normal  MS: no gross musculoskeletal defects noted, no edema  SKIN: no suspicious lesions or rashes  NEURO: Normal strength and tone, mentation intact and speech normal  PSYCH: mentation appears normal, affect normal/bright    No labs performed as performed in emergency department 1 1/2 weeks ago

## 2021-08-13 NOTE — PATIENT INSTRUCTIONS
Take carvediol 3.25 tablet -2 tablets twice a day twice a day with meals   Increase lisinopril from 10 mg daily to 20 mg daily  Follow up with us in clinic in one month  Continue other medications as you have been taking  Care coordinator will be reaching out to help set up follow up with neurosurgery and neurology

## 2021-08-16 ENCOUNTER — PATIENT OUTREACH (OUTPATIENT)
Dept: NURSING | Facility: CLINIC | Age: 46
End: 2021-08-16
Payer: COMMERCIAL

## 2021-08-16 NOTE — PROGRESS NOTES
Clinic Care Coordination Contact  Community Health Worker Initial Outreach    CHW Initial Information Gathering:  Referral Source: PCP  Preferred Hospital:  (None)  Preferred Urgent Care: [facility], [phone]  Current living arrangement:: I live in a private home with family  Type of residence:: Town home  Community Resources: Financial/Insurance  Supplies used at home:: None  Equipment Currently Used at Home: none  Transportation means:: Family  CHW Additional Questions  If ED/Hospital discharge, follow-up appointment scheduled as recommended?: N/A  Medication changes made following ED/Hospital discharge?: No  MyChart active?: No    Patient accepts CC: Yes. Patient scheduled for assessment with CC SW on 08/17/21 at 10:00 am. Patient noted desire to discuss Patient has not scheduled neurosurgery appointment..     MARCIO Demarco  Clinic Care Coordination  [facility]: [facility] [place] & [place]  Phone: [phone]    Compliance with medical treatment plan

## 2021-09-02 ENCOUNTER — OFFICE VISIT (OUTPATIENT)
Dept: NEUROLOGY | Facility: CLINIC | Age: 46
End: 2021-09-02
Payer: COMMERCIAL

## 2021-09-02 VITALS
BODY MASS INDEX: 17.48 KG/M2 | SYSTOLIC BLOOD PRESSURE: 150 MMHG | WEIGHT: 111.4 LBS | HEART RATE: 76 BPM | TEMPERATURE: 98 F | HEIGHT: 67 IN | DIASTOLIC BLOOD PRESSURE: 98 MMHG

## 2021-09-02 DIAGNOSIS — R40.4 EPISODE OF UNRESPONSIVENESS: ICD-10-CM

## 2021-09-02 DIAGNOSIS — G40.909: ICD-10-CM

## 2021-09-02 RX ORDER — LEVETIRACETAM 1000 MG/1
1000 TABLET ORAL AT BEDTIME
Qty: 31 TABLET | Refills: 11 | Status: SHIPPED | OUTPATIENT
Start: 2021-09-02 | End: 2022-04-14

## 2021-09-02 ASSESSMENT — MIFFLIN-ST. JEOR: SCORE: 1338.94

## 2021-09-02 ASSESSMENT — PAIN SCALES - GENERAL: PAINLEVEL: NO PAIN (0)
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
## 2021-09-10 ENCOUNTER — TELEPHONE (OUTPATIENT)
Dept: FAMILY MEDICINE | Facility: CLINIC | Age: 46
End: 2021-09-10

## 2021-09-10 ENCOUNTER — OFFICE VISIT (OUTPATIENT)
Dept: FAMILY MEDICINE | Facility: CLINIC | Age: 46
End: 2021-09-10
Payer: COMMERCIAL

## 2021-09-10 VITALS
OXYGEN SATURATION: 99 % | SYSTOLIC BLOOD PRESSURE: 166 MMHG | TEMPERATURE: 98.5 F | HEART RATE: 60 BPM | DIASTOLIC BLOOD PRESSURE: 118 MMHG

## 2021-09-10 DIAGNOSIS — Z86.79 HISTORY OF SUBDURAL HEMATOMA: ICD-10-CM

## 2021-09-10 DIAGNOSIS — K59.00 CONSTIPATION, UNSPECIFIED CONSTIPATION TYPE: ICD-10-CM

## 2021-09-10 DIAGNOSIS — N18.6 ESRD ON HEMODIALYSIS (H): Primary | ICD-10-CM

## 2021-09-10 DIAGNOSIS — Z99.2 ESRD ON HEMODIALYSIS (H): Primary | ICD-10-CM

## 2021-09-10 DIAGNOSIS — I12.9 RENAL HYPERTENSION: ICD-10-CM

## 2021-09-10 DIAGNOSIS — N17.9 ACUTE RENAL FAILURE, UNSPECIFIED ACUTE RENAL FAILURE TYPE (H): ICD-10-CM

## 2021-09-10 DIAGNOSIS — I10 HYPERTENSION GOAL BP (BLOOD PRESSURE) < 140/90: ICD-10-CM

## 2021-09-10 PROCEDURE — 99215 OFFICE O/P EST HI 40 MIN: CPT | Performed by: INTERNAL MEDICINE

## 2021-09-10 RX ORDER — AMOXICILLIN 250 MG
1-2 CAPSULE ORAL DAILY PRN
Qty: 60 TABLET | Refills: 0 | Status: ON HOLD | OUTPATIENT
Start: 2021-09-10 | End: 2022-02-14

## 2021-09-10 RX ORDER — LISINOPRIL 20 MG/1
20 TABLET ORAL DAILY
Qty: 30 TABLET | Refills: 0 | Status: SHIPPED | OUTPATIENT
Start: 2021-09-10 | End: 2021-10-12

## 2021-09-10 RX ORDER — CARVEDILOL 3.12 MG/1
6.25 TABLET ORAL 2 TIMES DAILY WITH MEALS
Qty: 120 TABLET | Refills: 0 | Status: SHIPPED | OUTPATIENT
Start: 2021-09-10 | End: 2021-09-15

## 2021-09-10 NOTE — PATIENT INSTRUCTIONS
Keep your appointment next week with Maddi Sims to review preventive health issues.    I will send a note to your kidney doctor about your blood pressure and medication adjustment.     Return if new or worsening symptoms.

## 2021-09-10 NOTE — TELEPHONE ENCOUNTER
Patient walk in to request refills on 2 medications: lisiniopril 20 mg and senexon 8.6-50 mg tablet. Patient was seen by a provider same day.

## 2021-09-10 NOTE — PROGRESS NOTES
Assessment & Plan     Johnny was seen today for hypertension.    Diagnoses and all orders for this visit:    ESRD on hemodialysis (H)    Constipation, unspecified constipation type  -     senna-docusate (SENOKOT-S/PERICOLACE) 8.6-50 MG tablet; Take 1-2 tablets by mouth daily as needed for constipation    Acute renal failure, unspecified acute renal failure type (H)  -     lisinopril (ZESTRIL) 20 MG tablet; Take 1 tablet (20 mg) by mouth daily  -     carvedilol (COREG) 3.125 MG tablet; Take 2 tablets (6.25 mg) by mouth 2 times daily (with meals)    Hypertension goal BP (blood pressure) < 140/90  -     lisinopril (ZESTRIL) 20 MG tablet; Take 1 tablet (20 mg) by mouth daily  -     carvedilol (COREG) 3.125 MG tablet; Take 2 tablets (6.25 mg) by mouth 2 times daily (with meals)    History of subdural hematoma    Renal hypertension    Patient with uncontrolled hypertension, history of end-stage renal disease on hemodialysis.  Had history of subdural hematoma in July.  It is unclear to me if he has been taking prescribed medication consistently.  At today's visit, I decided to just refilled the medication as previously prescribed.  I gave him instruction on how his medications are to be taken.  I sent a staff message to his kidney doctor's office via staff message today, to review his blood pressure at his dialysis later today and make medication adjustment as indicated.    He has an appointment with endocrine next week and with his PCP as well.  I instructed the patient to keep those appointments.  Return for Appointment with endocrine and your PCP as scheduled..    I spent a total of 40 minutes on the day of the visit.  doing chart review, history and exam, documentation and further activities as noted above       Miguel Hernandez MD PhD  [facility]    Veronica Turner is a 45 year old who presents for the following health issues     HPI   Patient walked into clinic to refill medications.  His blood  pressure was significantly elevated so patient was added onto my schedule.  I have not seen this patient before.  I had [employer]  on the phone for today's visit.  Despite this, is very difficult to communicate with patient.    Patient has a history of renal failure on hemodialysis Monday Wednesday and Friday.  He is due for another dialysis at 3 PM today.    He had a hospital follow-up appointment with his primary care physician a month ago.  That was an appointment was set up for follow-up next week, but patient thought it was today.    Patient had subdural hematoma in July, was hospitalized, had evacuation procedure.  Subsequently he has had headaches, has seen neurology.    Today patient brought 2 bottles for refill, lisinopril 20 mg and Senokot.  Patient last received lisinopril 30 tablets on August 13.  Patient reported he is out of the medication now.  His blood pressure has been running in the 160s over 100 at home as well as at dialysis.  At home he had taken 2 tablets of lisinopril.  If he takes 1 in the morning blood pressure still high, he will take another 1.  It was not clear to me how many days in the last month he has been doing this.    He also has carvedilol 3.125 mg tablets, taking 2 tablets twice a day.  Initially he said he was taking 1 tablet twice a day.  When I tried to clarify with him, then he said he was taking 2 tablets twice a day.  He stated he had a lot of this medication left.  Sound like he might not of taken 2 tablets twice a day consistently.  He really should have had only at most 2 days of pills left for carvedilol.    Patient denies any headache blurry vision dizziness or lightheadedness.    Patient reported he has had constipation lately, needed to get Senokot refilled.    Review of Systems   Constitutional, HEENT, cardiovascular, pulmonary, gi and gu systems are negative, except as otherwise noted.      Objective    BP (!) 166/118 (BP Location: Right arm, Patient  Position: Sitting, Cuff Size: Adult Regular)   Pulse 60   Temp 98.5  F (36.9  C) (Oral)   SpO2 99%   There is no height or weight on file to calculate BMI.  Physical Exam   GENERAL: healthy, alert and no distress  MS: no gross musculoskeletal defects noted, no edema

## 2021-09-14 DIAGNOSIS — N18.5 CHRONIC KIDNEY DISEASE, STAGE 5, KIDNEY FAILURE (H): Primary | ICD-10-CM

## 2021-09-14 NOTE — NURSING NOTE
Johnny Bingham's goals for this visit include:   Chief Complaint   Patient presents with     New Patient     referred by Micheline Simpson      Thyroid Problem     Thyroid Nodules  Goiter        He requests these members of his care team be copied on today's visit information: yes    PCP: [facility]    Referring Provider:  Micheline Dudley MD  [address] [place], [place] [id number]  [place] [address]    There were no vitals taken for this visit.    Do you need any medication refills at today's visit?

## 2021-09-15 ENCOUNTER — OFFICE VISIT (OUTPATIENT)
Dept: ENDOCRINOLOGY | Facility: CLINIC | Age: 46
End: 2021-09-15
Attending: INTERNAL MEDICINE
Payer: COMMERCIAL

## 2021-09-15 VITALS
SYSTOLIC BLOOD PRESSURE: 159 MMHG | DIASTOLIC BLOOD PRESSURE: 110 MMHG | WEIGHT: 112 LBS | HEART RATE: 66 BPM | BODY MASS INDEX: 17.54 KG/M2 | OXYGEN SATURATION: 99 %

## 2021-09-15 DIAGNOSIS — I10 HYPERTENSION GOAL BP (BLOOD PRESSURE) < 140/90: ICD-10-CM

## 2021-09-15 DIAGNOSIS — E04.1 THYROID NODULE: Primary | ICD-10-CM

## 2021-09-15 DIAGNOSIS — N17.9 ACUTE RENAL FAILURE, UNSPECIFIED ACUTE RENAL FAILURE TYPE (H): ICD-10-CM

## 2021-09-15 PROCEDURE — 99204 OFFICE O/P NEW MOD 45 MIN: CPT | Performed by: INTERNAL MEDICINE

## 2021-09-15 RX ORDER — CARVEDILOL 12.5 MG/1
12.5 TABLET ORAL 2 TIMES DAILY WITH MEALS
Qty: 60 TABLET | Refills: 3 | Status: SHIPPED | OUTPATIENT
Start: 2021-09-15 | End: 2021-11-17

## 2021-09-15 NOTE — NURSING NOTE
Johnny Bingham's goals for this visit include:   Chief Complaint   Patient presents with     New Patient     referred by Micheline Simpson      Thyroid Problem     Thyroid Nodules  Goiter      He requests these members of his care team be copied on today's visit information: Yes    PCP: [facility]    Referring Provider:  Micheline Dudley MD  [address] [place] [id number]  [place] [address]    BP (!) 159/110 (BP Location: Left arm, Patient Position: Sitting, Cuff Size: Adult Regular)   Pulse 66   Wt 50.8 kg (112 lb)   SpO2 99%   BMI 17.54 kg/m      Do you need any medication refills at today's visit? No

## 2021-09-15 NOTE — PROGRESS NOTES
The patient is seen in consultation at the request of Dr. Micheline Dudley for evaluation of thyroid nodules.   The patient is seen with the help of a German , via iPad.    Johnny Bingham is a 45 year old male Citizen of [place] male with a PMH significant for ESRD on hemodyalisis for the last 4-5 months, HTN,  referred by nephrolgy for evaluation of incidentally discovered left thyroid nodule on the upper extremity ultrasound done for venous mapping, in 2021.    I reviewed the ultrasound images.  It revealed a 1.6 cm left thyroid nodule, isoechoic, fairly heterogeneous, with no central vascularity and minimal peripheral vascularity.    The patient denies any personal history of neck enlargement or radiation exposure.  There is no family history of thyroid disease.  On questioning, he does report some fatigue, constipation present since dialysis, a longstanding history of cold intolerance but no other signs or symptoms suggestive of thyroid disease.    He was diagnosed with hypertension approximately 2 years ago.  His brother, currently living in [facility], was diagnosed with hypertension in his late 40s.  His younger brother  of an intestinal disorder at age 6 or 7.    Past Medical History   Past Medical History:   Diagnosis Date     CKD (chronic kidney disease) stage 5, GFR less than 15 ml/min (H)      Hypertension    Rib fractures >20 years ago     Past Surgical History   Past Surgical History:   Procedure Laterality Date     IR CVC TUNNEL PLACEMENT > 5 YRS OF AGE  5/10/2021     NO HISTORY OF SURGERY     S/P craniotomy 2021     Current Medications    Current Outpatient Medications:      acetaminophen (TYLENOL) 500 MG tablet, Take 1,000 mg by mouth, Disp: , Rfl:      calcium acetate (PHOSLO) 667 MG CAPS capsule, Take 2,001 mg by mouth, Disp: , Rfl:      carvedilol (COREG) 3.125 MG tablet, Take 2 tablets (6.25 mg) by mouth 2 times daily (with meals), Disp: 120 tablet, Rfl: 0     isoniazid  (NYDRAZID) 300 MG tablet, Take 1 tablet (300 mg) by mouth daily, Disp: 90 tablet, Rfl: 0     levETIRAcetam (KEPPRA) 1000 MG tablet, Take 1 tablet (1,000 mg) by mouth At Bedtime, Disp: 31 tablet, Rfl: 11     lisinopril (ZESTRIL) 20 MG tablet, Take 1 tablet (20 mg) by mouth daily, Disp: 30 tablet, Rfl: 0     senna-docusate (SENOKOT-S/PERICOLACE) 8.6-50 MG tablet, Take 1-2 tablets by mouth daily as needed for constipation, Disp: 60 tablet, Rfl: 0    Family History   Problem Relation Age of Onset     Heart Disease Mother      Kidney Disease Father      Heart Disease Father      Diabetes Brother      Social History  Single.  Immigrated to [place] 18 years ago.  No children. He has been smoking 4-5 ciggs a day for many years. He quit drinking alcohol in 2019. He used to drank 5-6 beers, occassionally.  Denies using illicit drugs. Occupation: unemployed.     Review of Systems   Systemic:             Fatigue noted recently   Eye:                      No eye symptoms   Kyra-Laryngeal:     No kyra-laryngeal symptoms, no dysphagia, no hoarseness, no cough     Breast:                  No breast symptoms  Cardiovascular:    No cardiovascular symptoms, no CP or palpitations   Pulmonary:           No pulmonary symptoms, no SOB or cough    Gastrointestinal:   Constipation since dialysis, relieved by senna   Genitourinary:       No genitourinary symptoms, no increased thirst or urination   Endocrine:            longstanding cold intolerance; no hot flashes, no facial flushing    Neurological:       No headaches since the craniotomy; mild hands tremor - noted for many years, when he carries something light; no numbness or tingling sensation, no dizziness   Musculoskeletal: leg cramps and neck cramps - mainly after dialysis   Skin:                     dry skin, hair falling out gradually over the last 10 years   Psychological:     No anxiety or depression               Vital Signs     Previous Weights:    Wt Readings from Last 10  Encounters:   09/15/21 50.8 kg (112 lb)   09/02/21 50.5 kg (111 lb 6.4 oz)   08/13/21 50.8 kg (112 lb)   07/30/21 52.6 kg (116 lb)   06/22/21 52.3 kg (115 lb 4.8 oz)   05/17/21 53.4 kg (117 lb 12.8 oz)   05/09/21 52.8 kg (116 lb 4.8 oz)   09/16/20 53.1 kg (117 lb)   09/12/20 52.6 kg (116 lb)   09/09/20 54.2 kg (119 lb 6.4 oz)        BP (!) 159/110 (BP Location: Left arm, Patient Position: Sitting, Cuff Size: Adult Regular)   Pulse 66   Wt 50.8 kg (112 lb)   SpO2 99%   BMI 17.54 kg/m      Physical Exam  General Appearance: he is well developed, well nourished and in no distress     Eyes:  conjutivae and extra-ocular motions are normal.                                    pupils round and reactive to light, no lid lag, no stare    HEENT:   oropharynx clear and moist, no JVD, no bruits      no thyromegaly, no palpable nodules   Cardiovascular:  regular rhythm, no murmurs, distal pulse palpable, no edema  Respiratory:        chest clear, no rales, no rhonchi   Musculoskeletal:  normal tone and strength  Psychological:          affect and judgment normal  Skin:  warm, no lesions  Neurological:  reflexes normal and symmetric, no resting tremor.     Lab Results  I reviewed prior lab results documented in Epic.  No results found for: TSH    Assessment     Johnny Bingham is a 45 year old male with a PMH significant for ESRD on hemodyalisis for the last 4-5 months, HTN,  referred by nephrolgy for evaluation of incidentally discovered left thyroid nodule on the upper extremity ultrasound done for venous mapping, in June 2021.  On the limited ultrasound available, the left thyroid nodule has benign ultrasound features. Johnny has no risk factors for thyroid cancer or a family history of thyroid disease.  Some of his symptoms are suggestive of hypothyroidism, although atypical.    Recommendations:  Check reflex TSH  Schedule a dedicated thyroid ultrasound    Orders Placed This Encounter   Procedures     US Thyroid     50  minutes spent on the date of the encounter doing chart review, history and exam, documentation and further activities as noted above.

## 2021-09-15 NOTE — LETTER
9/15/2021         RE: Johnny Bingham  [address]  [facility] [address]        Dear Colleague,    Thank you for referring your patient, Johnny Bingham, to the [facility]. Please see a copy of my visit note below.      The patient is seen in consultation at the request of Dr. Micheline Dudley for evaluation of thyroid nodules.   The patient is seen with the help of a Estonian , via iPad.    Johnny Bingham is a 45 year old male Swedish male with a PMH significant for ESRD on hemodyalisis for the last 4-5 months, HTN,  referred by nephrolgy for evaluation of incidentally discovered left thyroid nodule on the upper extremity ultrasound done for venous mapping, in 2021.    I reviewed the ultrasound images.  It revealed a 1.6 cm left thyroid nodule, isoechoic, fairly heterogeneous, with no central vascularity and minimal peripheral vascularity.    The patient denies any personal history of neck enlargement or radiation exposure.  There is no family history of thyroid disease.  On questioning, he does report some fatigue, constipation present since dialysis, a longstanding history of cold intolerance but no other signs or symptoms suggestive of thyroid disease.    He was diagnosed with hypertension approximately 2 years ago.  His brother, currently living in [place], was diagnosed with hypertension in his late 40s.  His younger brother  of an intestinal disorder at age 6 or 7.    Past Medical History   Past Medical History:   Diagnosis Date     CKD (chronic kidney disease) stage 5, GFR less than 15 ml/min (H)      Hypertension    Rib fractures >20 years ago     Past Surgical History   Past Surgical History:   Procedure Laterality Date     IR CVC TUNNEL PLACEMENT > 5 YRS OF AGE  5/10/2021     NO HISTORY OF SURGERY     S/P craniotomy 2021     Current Medications    Current Outpatient Medications:      acetaminophen (TYLENOL) 500 MG tablet, Take 1,000 mg by  mouth, Disp: , Rfl:      calcium acetate (PHOSLO) 667 MG CAPS capsule, Take 2,001 mg by mouth, Disp: , Rfl:      carvedilol (COREG) 3.125 MG tablet, Take 2 tablets (6.25 mg) by mouth 2 times daily (with meals), Disp: 120 tablet, Rfl: 0     isoniazid (NYDRAZID) 300 MG tablet, Take 1 tablet (300 mg) by mouth daily, Disp: 90 tablet, Rfl: 0     levETIRAcetam (KEPPRA) 1000 MG tablet, Take 1 tablet (1,000 mg) by mouth At Bedtime, Disp: 31 tablet, Rfl: 11     lisinopril (ZESTRIL) 20 MG tablet, Take 1 tablet (20 mg) by mouth daily, Disp: 30 tablet, Rfl: 0     senna-docusate (SENOKOT-S/PERICOLACE) 8.6-50 MG tablet, Take 1-2 tablets by mouth daily as needed for constipation, Disp: 60 tablet, Rfl: 0    Family History   Problem Relation Age of Onset     Heart Disease Mother      Kidney Disease Father      Heart Disease Father      Diabetes Brother      Social History  Single.  Immigrated to [place] 18 years ago.  No children. He has been smoking 4-5 ciggs a day for many years. He quit drinking alcohol in 2019. He used to drank 5-6 beers, occassionally.  Denies using illicit drugs. Occupation: unemployed.     Review of Systems   Systemic:             Fatigue noted recently   Eye:                      No eye symptoms   Kyra-Laryngeal:     No kyra-laryngeal symptoms, no dysphagia, no hoarseness, no cough     Breast:                  No breast symptoms  Cardiovascular:    No cardiovascular symptoms, no CP or palpitations   Pulmonary:           No pulmonary symptoms, no SOB or cough    Gastrointestinal:   Constipation since dialysis, relieved by senna   Genitourinary:       No genitourinary symptoms, no increased thirst or urination   Endocrine:            longstanding cold intolerance; no hot flashes, no facial flushing    Neurological:       No headaches since the craniotomy; mild hands tremor - noted for many years, when he carries something light; no numbness or tingling sensation, no dizziness   Musculoskeletal: leg cramps  and neck cramps - mainly after dialysis   Skin:                     dry skin, hair falling out gradually over the last 10 years   Psychological:     No anxiety or depression               Vital Signs     Previous Weights:    Wt Readings from Last 10 Encounters:   09/15/21 50.8 kg (112 lb)   09/02/21 50.5 kg (111 lb 6.4 oz)   08/13/21 50.8 kg (112 lb)   07/30/21 52.6 kg (116 lb)   06/22/21 52.3 kg (115 lb 4.8 oz)   05/17/21 53.4 kg (117 lb 12.8 oz)   05/09/21 52.8 kg (116 lb 4.8 oz)   09/16/20 53.1 kg (117 lb)   09/12/20 52.6 kg (116 lb)   09/09/20 54.2 kg (119 lb 6.4 oz)        BP (!) 159/110 (BP Location: Left arm, Patient Position: Sitting, Cuff Size: Adult Regular)   Pulse 66   Wt 50.8 kg (112 lb)   SpO2 99%   BMI 17.54 kg/m      Physical Exam  General Appearance: he is well developed, well nourished and in no distress     Eyes:  conjutivae and extra-ocular motions are normal.                                    pupils round and reactive to light, no lid lag, no stare    HEENT:   oropharynx clear and moist, no JVD, no bruits      no thyromegaly, no palpable nodules   Cardiovascular:  regular rhythm, no murmurs, distal pulse palpable, no edema  Respiratory:        chest clear, no rales, no rhonchi   Musculoskeletal:  normal tone and strength  Psychological:          affect and judgment normal  Skin:  warm, no lesions  Neurological:  reflexes normal and symmetric, no resting tremor.     Lab Results  I reviewed prior lab results documented in Epic.  No results found for: TSH    Assessment     Johnyn Bingham is a 45 year old male with a PMH significant for ESRD on hemodyalisis for the last 4-5 months, HTN,  referred by nephrolgy for evaluation of incidentally discovered left thyroid nodule on the upper extremity ultrasound done for venous mapping, in June 2021.  On the limited ultrasound available, the left thyroid nodule has benign ultrasound features. Johnny has no risk factors for thyroid cancer or a family  history of thyroid disease.  Some of his symptoms are suggestive of hypothyroidism, although atypical.    Recommendations:  Check reflex TSH  Schedule a dedicated thyroid ultrasound    Orders Placed This Encounter   Procedures     US Thyroid     50 minutes spent on the date of the encounter doing chart review, history and exam, documentation and further activities as noted above.                            Again, thank you for allowing me to participate in the care of your patient.        Sincerely,        Aretha Galo MD

## 2021-09-17 ENCOUNTER — OFFICE VISIT (OUTPATIENT)
Dept: FAMILY MEDICINE | Facility: CLINIC | Age: 46
End: 2021-09-17
Payer: COMMERCIAL

## 2021-09-17 VITALS
WEIGHT: 109.6 LBS | DIASTOLIC BLOOD PRESSURE: 70 MMHG | TEMPERATURE: 97 F | RESPIRATION RATE: 12 BRPM | SYSTOLIC BLOOD PRESSURE: 168 MMHG | BODY MASS INDEX: 17.17 KG/M2 | OXYGEN SATURATION: 99 % | HEART RATE: 55 BPM

## 2021-09-17 DIAGNOSIS — I10 HYPERTENSION GOAL BP (BLOOD PRESSURE) < 140/90: ICD-10-CM

## 2021-09-17 DIAGNOSIS — Z99.2 ESRD (END STAGE RENAL DISEASE) ON DIALYSIS (H): ICD-10-CM

## 2021-09-17 DIAGNOSIS — E01.0 THYROMEGALY: ICD-10-CM

## 2021-09-17 DIAGNOSIS — N18.6 ESRD (END STAGE RENAL DISEASE) ON DIALYSIS (H): ICD-10-CM

## 2021-09-17 DIAGNOSIS — E87.5 HYPERKALEMIA: ICD-10-CM

## 2021-09-17 DIAGNOSIS — R19.7 DIARRHEA, UNSPECIFIED TYPE: Primary | ICD-10-CM

## 2021-09-17 DIAGNOSIS — Z23 NEED FOR PROPHYLACTIC VACCINATION AND INOCULATION AGAINST INFLUENZA: ICD-10-CM

## 2021-09-17 DIAGNOSIS — R10.84 ABDOMINAL PAIN, GENERALIZED: ICD-10-CM

## 2021-09-17 PROBLEM — R40.4 EPISODE OF UNRESPONSIVENESS: Status: ACTIVE | Noted: 2021-09-17

## 2021-09-17 LAB
ALBUMIN SERPL-MCNC: 3.5 G/DL (ref 3.4–5)
ALP SERPL-CCNC: 42 U/L (ref 40–150)
ALT SERPL W P-5'-P-CCNC: 20 U/L (ref 0–70)
ANION GAP SERPL CALCULATED.3IONS-SCNC: 6 MMOL/L (ref 3–14)
AST SERPL W P-5'-P-CCNC: 18 U/L (ref 0–45)
BASOPHILS # BLD AUTO: 0 10E3/UL (ref 0–0.2)
BASOPHILS NFR BLD AUTO: 1 %
BILIRUB SERPL-MCNC: 0.6 MG/DL (ref 0.2–1.3)
BUN SERPL-MCNC: 103 MG/DL (ref 7–30)
CALCIUM SERPL-MCNC: 8.3 MG/DL (ref 8.5–10.1)
CHLORIDE BLD-SCNC: 108 MMOL/L (ref 94–109)
CO2 SERPL-SCNC: 22 MMOL/L (ref 20–32)
CREAT SERPL-MCNC: 10.9 MG/DL (ref 0.66–1.25)
EOSINOPHIL # BLD AUTO: 0.3 10E3/UL (ref 0–0.7)
EOSINOPHIL NFR BLD AUTO: 5 %
ERYTHROCYTE [DISTWIDTH] IN BLOOD BY AUTOMATED COUNT: 15.6 % (ref 10–15)
GFR SERPL CREATININE-BSD FRML MDRD: 5 ML/MIN/1.73M2
GLUCOSE BLD-MCNC: 91 MG/DL (ref 70–99)
HCT VFR BLD AUTO: 38.9 % (ref 40–53)
HGB BLD-MCNC: 12.5 G/DL (ref 13.3–17.7)
LYMPHOCYTES # BLD AUTO: 2.2 10E3/UL (ref 0.8–5.3)
LYMPHOCYTES NFR BLD AUTO: 33 %
MCH RBC QN AUTO: 27.1 PG (ref 26.5–33)
MCHC RBC AUTO-ENTMCNC: 32.1 G/DL (ref 31.5–36.5)
MCV RBC AUTO: 84 FL (ref 78–100)
MONOCYTES # BLD AUTO: 0.5 10E3/UL (ref 0–1.3)
MONOCYTES NFR BLD AUTO: 8 %
NEUTROPHILS # BLD AUTO: 3.6 10E3/UL (ref 1.6–8.3)
NEUTROPHILS NFR BLD AUTO: 54 %
PLATELET # BLD AUTO: 186 10E3/UL (ref 150–450)
POTASSIUM BLD-SCNC: 6.1 MMOL/L (ref 3.4–5.3)
PROT SERPL-MCNC: 8 G/DL (ref 6.8–8.8)
RBC # BLD AUTO: 4.61 10E6/UL (ref 4.4–5.9)
SODIUM SERPL-SCNC: 136 MMOL/L (ref 133–144)
TSH SERPL DL<=0.005 MIU/L-ACNC: 1.94 MU/L (ref 0.4–4)
WBC # BLD AUTO: 6.7 10E3/UL (ref 4–11)

## 2021-09-17 PROCEDURE — 36415 COLL VENOUS BLD VENIPUNCTURE: CPT | Performed by: PHYSICIAN ASSISTANT

## 2021-09-17 PROCEDURE — 84445 ASSAY OF TSI GLOBULIN: CPT | Mod: 90 | Performed by: PHYSICIAN ASSISTANT

## 2021-09-17 PROCEDURE — 90686 IIV4 VACC NO PRSV 0.5 ML IM: CPT | Performed by: PHYSICIAN ASSISTANT

## 2021-09-17 PROCEDURE — 80050 GENERAL HEALTH PANEL: CPT | Performed by: PHYSICIAN ASSISTANT

## 2021-09-17 PROCEDURE — 90471 IMMUNIZATION ADMIN: CPT | Performed by: PHYSICIAN ASSISTANT

## 2021-09-17 PROCEDURE — 99214 OFFICE O/P EST MOD 30 MIN: CPT | Mod: 25 | Performed by: PHYSICIAN ASSISTANT

## 2021-09-17 PROCEDURE — 99000 SPECIMEN HANDLING OFFICE-LAB: CPT | Performed by: PHYSICIAN ASSISTANT

## 2021-09-17 ASSESSMENT — PAIN SCALES - GENERAL: PAINLEVEL: NO PAIN (1)

## 2021-09-17 NOTE — PROGRESS NOTES
Assessment & Plan     Diarrhea, unspecified type  Approximately 24 hours - will obtain stool cultures and FIT test  - Fecal colorectal cancer screen (FIT)  - CBC with Platelets & Differential  - Clostridium difficile toxin B PCR  - Comprehensive metabolic panel  - Enteric Bacteria and Virus Panel by RENAN Stool  - Comprehensive metabolic panel  - CBC with Platelets & Differential    Abdominal pain, generalized  Hemoglobin actually improved from previous - normal wbc   - CBC with Platelets & Differential  - Clostridium difficile toxin B PCR  - Comprehensive metabolic panel  - Enteric Bacteria and Virus Panel by RENAN Stool  - Comprehensive metabolic panel  - CBC with Platelets & Differential    Thyromegaly  Labs per endocrinology    - Thyroid stimulating immunoglobulin  - TSH with free T4 reflex    Need for prophylactic vaccination and inoculation against influenza    - INFLUENZA VACCINE IM > 6 MONTHS VALENT IIV4 (AFLURIA/FLUZONE)    ESRD (end stage renal disease) on dialysis (H)  On dialysis Monday Wednesday Friday and day of visit was Friday prior to dialysis     Hyperkalemia  Called with critical value of 6.1 - patient contacted via phone- currently at dialysis but has not yet started yet- advised to inform RN of potassium 6.1 via Slovenian      Hypertension goal BP (blood pressure) < 140/90  Blood pressure not at goal but adjustments made yesterday per nephrology but has not yet started       Review of the result(s) of each unique test - cbc, cmp   Ordering of each unique test  Prescription drug management         Patient Instructions    new prescription for hypertension - carvediol 12.5 mg twice a day  Along with lisinopril 20 mg daily    Follow up with nephrology for uncontrolled blood pressure in 3-4 weeks   Go to emergency department urgently if any change in symptoms like increasing abdominal pain, fever, vomiting or other change in symptoms      Return in about 3 weeks (around 10/8/2021),  or if symptoms worsen or fail to improve, for in person.    HENRY Mcconnell [facility] ALLISON Turner is a 45 year old who presents for the following health issues  accompanied by his  (Swedish- on the phone):    HPI     follow up on BP  stomach pain and diarrhea since yesterday     Patient known to me- history of subdural hematoma requiring neurosurgery - has not yet had follow up with neurosurgery- out of our network at [facility]- headaches have resolved  Blood pressure has been high.  History of endstage renal disease on dialysis.   Nephrology increased medications for hypertension but hasn't started yet  Dull abdominal pain and diarrhea. Almost 20 diarrhea stools since yesterday  Little bit at a time.   Watery.  No blood in stool.    Black and yellow stools  I always have stomach pain at night and diarrhea  Vomited once last night   5 AM diarrhea  Dialysis mon, wed , fri in [facility] - nephrologist is Dr. Dudley  Currently has line right anterior chest but hoping to have AV fistula placed for dialysis - but needs clearance from neurosurgery             Review of Systems   Constitutional, HEENT, cardiovascular, pulmonary, gi and gu systems are negative, except as otherwise noted.      Objective    BP (!) 168/70   Pulse 55   Temp 97  F (36.1  C) (Tympanic)   Resp 12   Wt 49.7 kg (109 lb 9.6 oz)   SpO2 99%   BMI 17.17 kg/m    Body mass index is 17.17 kg/m .  Physical Exam   GENERAL: healthy, alert and no distress  NECK: no adenopathy, no asymmetry, masses, or scars and thyroid normal to palpation  RESP: lungs clear to auscultation - no rales, rhonchi or wheezes  CV: regular rate and rhythm, normal S1 S2, no S3 or S4, no murmur, click or rub, no peripheral edema and peripheral pulses strong  ABDOMEN: soft, nontender, no hepatosplenomegaly, no masses and bowel sounds normal  MS: no gross musculoskeletal defects noted, no  edema    Results for orders placed or performed in visit on 09/17/21   Comprehensive metabolic panel     Status: Abnormal   Result Value Ref Range    Sodium 136 133 - 144 mmol/L    Potassium 6.1 (HH) 3.4 - 5.3 mmol/L    Chloride 108 94 - 109 mmol/L    Carbon Dioxide (CO2) 22 20 - 32 mmol/L    Anion Gap 6 3 - 14 mmol/L    Urea Nitrogen 103 (HH) 7 - 30 mg/dL    Creatinine 10.90 (HH) 0.66 - 1.25 mg/dL    Calcium 8.3 (L) 8.5 - 10.1 mg/dL    Glucose 91 70 - 99 mg/dL    Alkaline Phosphatase 42 40 - 150 U/L    AST 18 0 - 45 U/L    ALT 20 0 - 70 U/L    Protein Total 8.0 6.8 - 8.8 g/dL    Albumin 3.5 3.4 - 5.0 g/dL    Bilirubin Total 0.6 0.2 - 1.3 mg/dL    GFR Estimate 5 (L) >60 mL/min/1.73m2   TSH with free T4 reflex     Status: Normal   Result Value Ref Range    TSH 1.94 0.40 - 4.00 mU/L   CBC with platelets and differential     Status: Abnormal   Result Value Ref Range    WBC Count 6.7 4.0 - 11.0 10e3/uL    RBC Count 4.61 4.40 - 5.90 10e6/uL    Hemoglobin 12.5 (L) 13.3 - 17.7 g/dL    Hematocrit 38.9 (L) 40.0 - 53.0 %    MCV 84 78 - 100 fL    MCH 27.1 26.5 - 33.0 pg    MCHC 32.1 31.5 - 36.5 g/dL    RDW 15.6 (H) 10.0 - 15.0 %    Platelet Count 186 150 - 450 10e3/uL    % Neutrophils 54 %    % Lymphocytes 33 %    % Monocytes 8 %    % Eosinophils 5 %    % Basophils 1 %    Absolute Neutrophils 3.6 1.6 - 8.3 10e3/uL    Absolute Lymphocytes 2.2 0.8 - 5.3 10e3/uL    Absolute Monocytes 0.5 0.0 - 1.3 10e3/uL    Absolute Eosinophils 0.3 0.0 - 0.7 10e3/uL    Absolute Basophils 0.0 0.0 - 0.2 10e3/uL   CBC with Platelets & Differential     Status: Abnormal    Narrative    The following orders were created for panel order CBC with Platelets & Differential.  Procedure                               Abnormality         Status                     ---------                               -----------         ------                     CBC with platelets and d...[[id number]]  Abnormal            Final result                 Please view results  for these tests on the individual orders.

## 2021-09-17 NOTE — PATIENT INSTRUCTIONS
new prescription for hypertension - carvediol 12.5 mg twice a day  Along with lisinopril 20 mg daily    Follow up with nephrology for uncontrolled blood pressure in 3-4 weeks   Go to emergency department urgently if any change in symptoms like increasing abdominal pain, fever, vomiting or other change in symptoms

## 2021-09-17 NOTE — RESULT ENCOUNTER NOTE
Patient notified via phone with [employer]  that potassium was critical of 6.1.  at dialysis now- hasn't started yet usually completes dialysis around 6 pm   Advised to notify RN that potassium was 6.1 today in clinic!   Goes to [place] for dialysis Monday, Wednesday, friday

## 2021-09-17 NOTE — LETTER
September 17, 2021      Johnny Bingham  [address]  [place] [address]        Dear ,    We are writing to inform you of your test results.    The thyroid hormone level is normal.     Resulted Orders   TSH with free T4 reflex   Result Value Ref Range    TSH 1.94 0.40 - 4.00 mU/L       If you have any questions or concerns, please call the clinic at the number listed above.       Sincerely,      Aretha Galo MD

## 2021-09-21 ENCOUNTER — ANCILLARY PROCEDURE (OUTPATIENT)
Dept: ULTRASOUND IMAGING | Facility: CLINIC | Age: 46
End: 2021-09-21
Attending: INTERNAL MEDICINE
Payer: COMMERCIAL

## 2021-09-21 DIAGNOSIS — E04.1 THYROID NODULE: ICD-10-CM

## 2021-09-21 PROCEDURE — 76536 US EXAM OF HEAD AND NECK: CPT | Performed by: RADIOLOGY

## 2021-09-22 LAB — TSI SER-ACNC: <1 TSI INDEX

## 2021-09-24 NOTE — RESULT ENCOUNTER NOTE
Please let the patient know the thyroid nodule came back indeterminate and I would recommend to consider a biopsy.  The thyroid hormone levels are normal.

## 2021-09-27 ENCOUNTER — TELEPHONE (OUTPATIENT)
Dept: ENDOCRINOLOGY | Facility: CLINIC | Age: 46
End: 2021-09-27

## 2021-09-27 NOTE — TELEPHONE ENCOUNTER
Patient advised of results and recommendations from Dr. Galo. Patient verbalizes understanding and agrees to plan. Writer will have scheduling reach out to patient to arrange thyroid FNA.     Zandra Yuan RN  Endocrine Care Coordinator  [facility]

## 2021-09-27 NOTE — TELEPHONE ENCOUNTER
----- Message from Aretha Galo MD sent at 9/24/2021  2:32 PM CDT -----  Please let the patient know the thyroid nodule came back indeterminate and I would recommend to consider a biopsy.  The thyroid hormone levels are normal.

## 2021-09-28 DIAGNOSIS — T82.898A PROBLEM WITH DIALYSIS ACCESS, INITIAL ENCOUNTER (H): Primary | ICD-10-CM

## 2021-09-29 ENCOUNTER — HOSPITAL ENCOUNTER (OUTPATIENT)
Facility: CLINIC | Age: 46
End: 2021-09-29
Admitting: RADIOLOGY
Payer: COMMERCIAL

## 2021-09-29 ENCOUNTER — CARE COORDINATION (OUTPATIENT)
Dept: NEPHROLOGY | Facility: CLINIC | Age: 46
End: 2021-09-29

## 2021-09-29 DIAGNOSIS — Z11.59 ENCOUNTER FOR SCREENING FOR OTHER VIRAL DISEASES: ICD-10-CM

## 2021-09-29 DIAGNOSIS — T82.898A PROBLEM WITH DIALYSIS ACCESS, INITIAL ENCOUNTER (H): Primary | ICD-10-CM

## 2021-09-30 ENCOUNTER — TELEPHONE (OUTPATIENT)
Dept: INTERVENTIONAL RADIOLOGY/VASCULAR | Facility: CLINIC | Age: 46
End: 2021-09-30

## 2021-09-30 NOTE — TELEPHONE ENCOUNTER
Pt called for pre-procedure/covid. Pt has a covid test scheduled for 10/1/21.  Pt will need a English interpretor.  Pt answered all questions and had no further questions at this time.

## 2021-10-01 ENCOUNTER — LAB (OUTPATIENT)
Dept: URGENT CARE | Facility: URGENT CARE | Age: 46
End: 2021-10-01
Attending: NURSE PRACTITIONER
Payer: COMMERCIAL

## 2021-10-01 DIAGNOSIS — Z11.59 ENCOUNTER FOR SCREENING FOR OTHER VIRAL DISEASES: ICD-10-CM

## 2021-10-01 LAB — SARS-COV-2 RNA RESP QL NAA+PROBE: NEGATIVE

## 2021-10-01 PROCEDURE — U0003 INFECTIOUS AGENT DETECTION BY NUCLEIC ACID (DNA OR RNA); SEVERE ACUTE RESPIRATORY SYNDROME CORONAVIRUS 2 (SARS-COV-2) (CORONAVIRUS DISEASE [COVID-19]), AMPLIFIED PROBE TECHNIQUE, MAKING USE OF HIGH THROUGHPUT TECHNOLOGIES AS DESCRIBED BY CMS-2020-01-R: HCPCS

## 2021-10-01 PROCEDURE — U0005 INFEC AGEN DETEC AMPLI PROBE: HCPCS

## 2021-10-04 ENCOUNTER — CARE COORDINATION (OUTPATIENT)
Dept: NEPHROLOGY | Facility: CLINIC | Age: 46
End: 2021-10-04

## 2021-10-04 RX ORDER — FLUMAZENIL 0.1 MG/ML
0.2 INJECTION, SOLUTION INTRAVENOUS
Status: CANCELLED | OUTPATIENT
Start: 2021-10-04

## 2021-10-04 RX ORDER — NALOXONE HYDROCHLORIDE 0.4 MG/ML
0.4 INJECTION, SOLUTION INTRAMUSCULAR; INTRAVENOUS; SUBCUTANEOUS
Status: CANCELLED | OUTPATIENT
Start: 2021-10-04

## 2021-10-04 RX ORDER — CEFAZOLIN SODIUM 2 G/100ML
2 INJECTION, SOLUTION INTRAVENOUS
Status: CANCELLED | OUTPATIENT
Start: 2021-10-04

## 2021-10-04 RX ORDER — NALOXONE HYDROCHLORIDE 0.4 MG/ML
0.2 INJECTION, SOLUTION INTRAMUSCULAR; INTRAVENOUS; SUBCUTANEOUS
Status: CANCELLED | OUTPATIENT
Start: 2021-10-04

## 2021-10-04 RX ORDER — ACETAMINOPHEN 325 MG/1
650 TABLET ORAL
Status: CANCELLED | OUTPATIENT
Start: 2021-10-04

## 2021-10-04 RX ORDER — FENTANYL CITRATE 50 UG/ML
25-50 INJECTION, SOLUTION INTRAMUSCULAR; INTRAVENOUS EVERY 5 MIN PRN
Status: CANCELLED | OUTPATIENT
Start: 2021-10-04

## 2021-10-04 NOTE — PROGRESS NOTES
Received call from Luciano LEE RN, Amber.  PT did not go to CVC exchange appointment because RN didn't think pt did his COVID test on Friday.    Informed her he did do hid COVID test Friday morning, but did not show up for today's procedure.    Called IR to reschedule.  Pt scheduled for Wednesday 10/6/21, 0930 arrival, 1100 procedure, NPO after 0600.  SELAM Sanchez will relay info to pt and pt brother.  Dialysis clinic will do saliva COVID-19 test and send it out today to have results for Wednesday AM.  Gave Amber fax number for [facility] and phone number.  Amber will also send most recent rounding report and vitals on pt to IR.    DEXTER RUBI RN on 10/4/2021 at 4:14 PM  Dialysis Access Care Coordinator  Phone: [phone]

## 2021-10-06 ENCOUNTER — HOSPITAL ENCOUNTER (OUTPATIENT)
Facility: CLINIC | Age: 46
Discharge: HOME | End: 2021-10-06
Attending: RADIOLOGY | Admitting: RADIOLOGY
Payer: COMMERCIAL

## 2021-10-06 ENCOUNTER — APPOINTMENT (OUTPATIENT)
Dept: INTERVENTIONAL RADIOLOGY/VASCULAR | Facility: CLINIC | Age: 46
End: 2021-10-06
Attending: NURSE PRACTITIONER
Payer: COMMERCIAL

## 2021-10-06 VITALS
DIASTOLIC BLOOD PRESSURE: 114 MMHG | OXYGEN SATURATION: 100 % | TEMPERATURE: 97.7 F | SYSTOLIC BLOOD PRESSURE: 159 MMHG | RESPIRATION RATE: 18 BRPM | HEART RATE: 59 BPM

## 2021-10-06 DIAGNOSIS — T82.898A PROBLEM WITH DIALYSIS ACCESS, INITIAL ENCOUNTER (H): ICD-10-CM

## 2021-10-06 PROCEDURE — C1750 CATH, HEMODIALYSIS,LONG-TERM: HCPCS

## 2021-10-06 PROCEDURE — 255N000002 HC RX 255 OP 636: Performed by: RADIOLOGY

## 2021-10-06 PROCEDURE — C1769 GUIDE WIRE: HCPCS

## 2021-10-06 PROCEDURE — 99152 MOD SED SAME PHYS/QHP 5/>YRS: CPT

## 2021-10-06 PROCEDURE — 272N000196 HC ACCESSORY CR5

## 2021-10-06 PROCEDURE — 272N000116 HC CATH CR1

## 2021-10-06 PROCEDURE — 999N000163 HC STATISTIC SIMPLE TUBE INSERTION/CHARGE, PORT, CATH, FISTULOGRAM

## 2021-10-06 PROCEDURE — 250N000011 HC RX IP 250 OP 636: Performed by: RADIOLOGY

## 2021-10-06 PROCEDURE — 250N000011 HC RX IP 250 OP 636: Performed by: NURSE PRACTITIONER

## 2021-10-06 PROCEDURE — 250N000009 HC RX 250: Performed by: NURSE PRACTITIONER

## 2021-10-06 RX ORDER — NALOXONE HYDROCHLORIDE 0.4 MG/ML
0.2 INJECTION, SOLUTION INTRAMUSCULAR; INTRAVENOUS; SUBCUTANEOUS
Status: DISCONTINUED | OUTPATIENT
Start: 2021-10-06 | End: 2021-10-06 | Stop reason: HOSPADM

## 2021-10-06 RX ORDER — HEPARIN SODIUM 1000 [USP'U]/ML
6 INJECTION, SOLUTION INTRAVENOUS; SUBCUTANEOUS ONCE
Status: COMPLETED | OUTPATIENT
Start: 2021-10-06 | End: 2021-10-06

## 2021-10-06 RX ORDER — FLUMAZENIL 0.1 MG/ML
0.2 INJECTION, SOLUTION INTRAVENOUS
Status: DISCONTINUED | OUTPATIENT
Start: 2021-10-06 | End: 2021-10-06 | Stop reason: HOSPADM

## 2021-10-06 RX ORDER — NALOXONE HYDROCHLORIDE 0.4 MG/ML
0.4 INJECTION, SOLUTION INTRAMUSCULAR; INTRAVENOUS; SUBCUTANEOUS
Status: DISCONTINUED | OUTPATIENT
Start: 2021-10-06 | End: 2021-10-06 | Stop reason: HOSPADM

## 2021-10-06 RX ORDER — ACETAMINOPHEN 325 MG/1
650 TABLET ORAL
Status: DISCONTINUED | OUTPATIENT
Start: 2021-10-06 | End: 2021-10-06 | Stop reason: HOSPADM

## 2021-10-06 RX ORDER — IOPAMIDOL 612 MG/ML
50 INJECTION, SOLUTION INTRAVASCULAR ONCE
Status: COMPLETED | OUTPATIENT
Start: 2021-10-06 | End: 2021-10-06

## 2021-10-06 RX ORDER — CEFAZOLIN SODIUM 2 G/100ML
2 INJECTION, SOLUTION INTRAVENOUS
Status: COMPLETED | OUTPATIENT
Start: 2021-10-06 | End: 2021-10-06

## 2021-10-06 RX ORDER — FENTANYL CITRATE 50 UG/ML
25-50 INJECTION, SOLUTION INTRAMUSCULAR; INTRAVENOUS EVERY 5 MIN PRN
Status: DISCONTINUED | OUTPATIENT
Start: 2021-10-06 | End: 2021-10-06 | Stop reason: HOSPADM

## 2021-10-06 RX ADMIN — FENTANYL CITRATE 25 MCG: 50 INJECTION, SOLUTION INTRAMUSCULAR; INTRAVENOUS at 11:09

## 2021-10-06 RX ADMIN — MIDAZOLAM HYDROCHLORIDE 0.5 MG: 1 INJECTION, SOLUTION INTRAMUSCULAR; INTRAVENOUS at 11:00

## 2021-10-06 RX ADMIN — HEPARIN SODIUM 6000 UNITS: 1000 INJECTION INTRAVENOUS; SUBCUTANEOUS at 11:19

## 2021-10-06 RX ADMIN — IOPAMIDOL 10 ML: 612 INJECTION, SOLUTION INTRAVENOUS at 11:20

## 2021-10-06 RX ADMIN — LIDOCAINE HYDROCHLORIDE 9 ML: 10 INJECTION, SOLUTION INFILTRATION; PERINEURAL at 11:19

## 2021-10-06 RX ADMIN — CEFAZOLIN SODIUM 2 G: 2 INJECTION, SOLUTION INTRAVENOUS at 10:42

## 2021-10-06 RX ADMIN — FENTANYL CITRATE 25 MCG: 50 INJECTION, SOLUTION INTRAMUSCULAR; INTRAVENOUS at 11:00

## 2021-10-06 RX ADMIN — MIDAZOLAM HYDROCHLORIDE 0.5 MG: 1 INJECTION, SOLUTION INTRAMUSCULAR; INTRAVENOUS at 11:09

## 2021-10-06 NOTE — DISCHARGE INSTRUCTIONS
Tunnel Cath Insertion/Exchange Discharge Instructions     After you go home:      You may resume your normal diet    Have an adult stay with you for 6 hours       For 24 hours - due to the sedation you received:    Relax and take it easy    Do NOT make any important or legal decisions    Do NOT drive or operate machines at home or at work    Do NOT drink alcohol    Care of Puncture Site:      Keep the dressing clean & dry    Check the site twice a day for signs of infection    Do not swim while you have the tunnel catheter (to prevent infection)    If you shower, place a waterproof cover over the dressing (such as plastic wrap)    You may take a bath if the water stays below your waist. Sponge bathe your upper body to keep the dressing from getting wet    Caring for the Catheter:      Check the skin around the tube each day for redness, swelling, drainage or tenderness. These are all signs of infection    Take your temperature daily     Check your catheter every day:      Make sure the clamps are closed over both ends of the tubing    Check that the caps are tight    If a cap comes off, you may have bleeding from the tube, or air could get into the bloodstream.  Wrap the end of the tube in a clean gauze and call your provider or dialysis unit immediately    Your catheter is not likely to fall out. It is secured with stitches to your skin.  Also, a small cuff under the skin helps to hold the catheter in place.  If the catheter does fall out, put firm pressure on the exit site with a clean gauze & seek medical attention immediately    Activity:       You may go back to normal activity in 24 hours     Avoid heavy lifting (greater than 10 pounds), strenuous activity or the overuse of your shoulder for 3 days    Bleeding:      If you start bleeding from the incision sites in your chest or neck - or have swelling in your neck, sit down and press on the site for 5-10 minutes.     If bleeding has not stopped after 10  minutes, call your provider.        Call 911 right away if you have heavy bleeding or bleeding that does not stop.      Medicines:      You may resume all medications    Resume your Warfarin/Coumadin at your regular dose today. Follow up with your provider to have your INR rechecked    Resume your Platelet Inhibitors and Aspirin tomorrow at your regular dose    For minor pain, you may take Acetaminophen (Tylenol) or Ibuprofen (Advil)                 Call the provider who ordered this procedure if:      The site is red, swollen, hot or tender or there is drainage at the site    You have chills or a fever greater than 100 F (37.8 C)    A cap comes off    The catheter falls out    Any questions or concerns    Call  911 or go to the Emergency Room if you have:      Trouble breathing    Chest or shoulder pain not related to procedure    Bleeding that you cannot control      If you have questions call:          [facility] @ [phone]        The provider who performed your procedure was _________________.

## 2021-10-06 NOTE — PROGRESS NOTES
"  Interventional Radiology - Pre-Procedure Note:  10/6/2021    Procedure Requested: Dialysis catheter exchange  Requested by: Tavo PIERCE    History and Physical Reviewed: H&P documented within 30 days (by Shikha FIGUEROA on 9/17/21).  I have personally reviewed the patient's medical history and have updated the medical record as necessary.    Brief HPI: Johnny Bingham is a 45 year old male with ESRD on dialysis, s/p tunneled dialysis catheter placement. Per request: \"Very poor BFR at dialysis and frequent alarms, despite multiple Activase uses.\" Patient receives dialysis MWF. States he had his normal 3 hour run on Friday however Monday only received 1.5 hours due to difficulty with catheter. Catheter placed 5/10/21 and has not had problems with it until now. Feels well today.     IMAGING:  Tunneled HD cath placement 5/10/21:  \"PROCEDURE:    The Central Venous Catheter Insertion checklist was reviewed prior to  placement and followed throughout the procedure. Using local  anesthesia and real-time ultrasound guidance the right internal  jugular vein was accessed. A permanent ultrasound image was saved,  documenting patency and compressibility. A subcutaneous tunnel was  created requiring a second incision. Using this access, a 23 cm tip to  cuff 14 Fr Palindrome dialysis catheter was advanced until the tip was  in the mid/low right atrium.  The catheter was tested and found to  flush and aspirate appropriately.     FINDINGS:  Ultrasound shows an anechoic and compressible jugular vein.       Fluoroscopic spot film at completion of study show that the tunneled  dialysis catheter tip lies in the mid/low right atrium.                                                                      IMPRESSION:    1.  Successful tunneled dialysis catheter placement.  2.  The catheter is ready for use.\"    NPO: YES since MN  ANTICOAGULANTS: NONE  ANTIBIOTICS: Ancef 2g IV for prophylaxis today    ALLERGIES  No Known Allergies      LABS:  INR "   Date Value Ref Range Status   05/10/2021 1.02 0.86 - 1.14 Final      Hemoglobin   Date Value Ref Range Status   09/17/2021 12.5 (L) 13.3 - 17.7 g/dL Final   05/17/2021 8.1 (L) 13.3 - 17.7 g/dL Final   ]  Platelet Count   Date Value Ref Range Status   09/17/2021 186 150 - 450 10e3/uL Final   05/13/2021 200 150 - 450 10e9/L Final     Creatinine   Date Value Ref Range Status   09/17/2021 10.90 (HH) 0.66 - 1.25 mg/dL Final   05/17/2021 4.71 (H) 0.66 - 1.25 mg/dL Final     Potassium   Date Value Ref Range Status   09/17/2021 6.1 (HH) 3.4 - 5.3 mmol/L Final   05/17/2021 3.5 3.4 - 5.3 mmol/L Final         EXAM:  BP (!) 153/117 (BP Location: Left arm)   Pulse 68   Temp 97.7  F (36.5  C) (Oral)   Resp 16   SpO2 100%   General:  Stable.  In no acute distress.    Neuro:  A&O x 3. Moves all extremities equally.  Heart: RRR  Lungs: No increased work of breathing  Chest: Tunneled catheter right chest to RIJ without gross signs of infection or irritation    Pre-Sedation Code Status Assessment:  Code Status: Full Code      ASSESSMENT/PLAN:   ESRD on dialysis  Malfunction of tunneled HD cath    -Tunneled cath exchange today with sedation    Procedure, risks/benefits, details, alternatives, and sedation reviewed with patient via [facility]  and patient verbalized understanding. All questions answered. OK to proceed with above radiology procedure.     Shimon Marcelo PA-C  Interventional Radiology  [phone]      Total time spent on the date of the encounter is 30 minutes, including time spent counseling the patient, performing a medically appropriate evaluation, reviewing prior medical history, ordering medications and tests, documenting clinical information in the medical record, and communication of results.

## 2021-10-06 NOTE — PROGRESS NOTES
Care Suites Admission Nursing Note    Patient Information  Name: Johnny Bingham  Age: 45 year old  Reason for admission: tunnel cath revision  Care Suites arrival time: 0915    Visitor Information  Name: n/a  Informed of visitor restrictions: N/A  1 visitor allowed per patient   Visitor must screen negative for COVID symptoms   Visitor must wear a mask  Waiting rooms closed to visitors    Patient Admission/Assessment   Pre-procedure assessment complete: Yes  If abnormal assessment/labs, provider notified: N/A  NPO: Yes  Medications held per instructions/orders: Yes  Consent: obtained  If applicable, pregnancy test status: n/a  Patient oriented to room: Yes  Education/questions answered: Yes  Plan/other: proceed    Discharge Planning  Discharge name/phone number: Father or brother   Overnight post sedation caregiver: father and  Brother  Discharge location: home  PATIENT/VISITOR WELLNESS SCREENING    Step 1 Patient Screening    1. In the last month, have you been in contact with someone who was confirmed or suspected to have Coronavirus/COVID-19? No    2. Do you have the following symptoms?  Fever/Chills? No   Cough? No   Shortness of breath? No   New loss of taste or smell? No  Sore throat? No  Muscle or body aches? No  Headaches? No  Fatigue? No  Vomiting or diarrhea? No    Step 2 Visitor Screening    1. Name of Visitor (1 visitor per patient): n/a    If the visitor has positive symptoms, notify supervisor/manger  Per policy, the visitor will need to leave the facility     Step 3 Refer to logic grid below for actions    NO SYMPTOM(S)    ACTIONS:  1. Standard rooming process  2. Provider to assess per normal protocol  3. Implement precautions as needed and per guidelines     POSITIVE SYMPTOM(S)  If positive for ANY of the following symptoms: fever, cough, shortness of breath, rash    ACTION:  1. Continue to have the patient wear a mask   2. Room patient as soon as possible  3. Don appropriate PPE when  entering room  4. Provider evaluation    Giorgi Lord RN

## 2021-10-06 NOTE — PROCEDURES
[facility]    Procedure: TDC revision    Date/Time: 10/6/2021 11:21 AM  Performed by: Raymundo Zuniga MD  Authorized by: Raymundo Zuniga MD     UNIVERSAL PROTOCOL   Site Marked: Yes  Prior Images Obtained and Reviewed:  Yes  Required items: Required blood products, implants, devices and special equipment available    Patient identity confirmed:  Verbally with patient  Patient was reevaluated immediately before administering moderate or deep sedation or anesthesia  Confirmation Checklist:  Patient's identity using two indicators, relevant allergies, procedure was appropriate and matched the consent or emergent situation and correct equipment/implants were available  Time out: Immediately prior to the procedure a time out was called    Universal Protocol: the Joint Commission Universal Protocol was followed    Preparation: Patient was prepped and draped in usual sterile fashion    ESBL (mL):  10         ANESTHESIA    Anesthesia: Local infiltration  Local Anesthetic:  Lidocaine 1% without epinephrine  Anesthetic Total (mL):  10      SEDATION    Patient Sedated: Yes    Sedation Type:  Moderate (conscious) sedation  Sedation:  Fentanyl and midazolam  Vital signs: Vital signs monitored during sedation    See dictated procedure note for full details.  PROCEDURE   Patient Tolerance:  Patient tolerated the procedure well with no immediate complications  Describe Procedure: No fibrin sheath, possible small clots in the catheter.  Successful exchange with a 23 cm cuff to tip HD cath  Length of time physician/provider present for 1:1 monitoring during sedation: 20

## 2021-10-06 NOTE — IR NOTE
Interventional Radiology Intra-procedural Nursing Note    Patient Name: Johnny Bingham  Medical Record Number: [id number]  Today's Date: October 6, 2021    Start Time: 1100  End of procedure time: 1120  Procedure: Tunneled HD catheter exchange with sedation  Report given to RN    Other Notes: Pt into IR suite 2 via cart IV abx infusing. Pt awake and alert. To table in supine position prepped and drapped with 2%. VSS. Tele SB. Dr. Zuniga in room. Time out and procedure started. Pt tolerated procedure well. Debrief with Dr. Zuniga. Dressing CDI. No complications. Pt transferred back to Care suites. Report given at bedside.    Medications: Last sedation given at 1109    Versed 1mg  Fentanyl 50mcg  Lidocaine 1% 9ml  Heparin 6000units to CVC hep lock    Bhakti Jarrett RN

## 2021-10-06 NOTE — PROGRESS NOTES
Care Suites Discharge Summary    Discharge Criteria:   Discharge Criteria met per MD orders: Yes.   Vital signs stable.     Pt demonstrates ability to ambulate safely: Yes.  (See discharge questionnaire for additional information)    Discharge instructions & education:   Discharge instructions reviewed with patient. Patient verbalizes understanding w  services   Additional patient education provided:  Tunnel revision    Medications:   Patient will be discharging on new medications- No. Patient verbalizes reason for use, start date, and side effects NA.    Items returned to patient:   Home and hospital acquired medications returned to patient NA   Listed belongings gathered and returned to patient: Yes    Patient discharged to home.    Giorgi Lord RN

## 2021-10-06 NOTE — PRE-PROCEDURE
GENERAL PRE-PROCEDURE:   Procedure:  Tunneled HD catheter exchange with sedation  Date/Time:  10/6/2021 10:38 AM    Written consent obtained?: Yes    Risks and benefits: Risks, benefits and alternatives were discussed    Consent given by:  Patient  Patient states understanding of procedure being performed: Yes    Patient's understanding of procedure matches consent: Yes    Procedure consent matches procedure scheduled: Yes    Expected level of sedation:  Moderate  Appropriately NPO:  Yes  ASA Class:  3  Mallampati  :  Grade 2- soft palate, base of uvula, tonsillar pillars, and portion of posterior pharyngeal wall visible  Lungs:  Lungs clear with good breath sounds bilaterally  Heart:  Normal heart sounds and rate  History & Physical reviewed:  History and physical reviewed and no updates needed  Statement of review:  I have reviewed the lab findings, diagnostic data, medications, and the plan for sedation

## 2021-10-10 NOTE — PROGRESS NOTES
"    Assessment & Plan     Hypertension goal BP (blood pressure) < 140/90  Blood pressure at goal with lisinopril 20mg, coreg 12.5 mg twice a day   Continue medications as has been taking  Basic metabolic panel pending.  Last potassium was elevated at last visit just prior to dialysis   - Basic metabolic panel  (Ca, Cl, CO2, Creat, Gluc, K, Na, BUN)  - lisinopril (ZESTRIL) 20 MG tablet  Dispense: 90 tablet; Refill: 0  - Basic metabolic panel  (Ca, Cl, CO2, Creat, Gluc, K, Na, BUN)    Acute renal failure, unspecified acute renal failure type (H)  Refilled lisinopril- looks like should have coreg available   - lisinopril (ZESTRIL) 20 MG tablet  Dispense: 90 tablet; Refill: 0    Fatigue, unspecified type  Hemoglobin declined.  Thyroid labs and basic metabolic panel pending. Encouraged to follow up with his nephrologist- recommended FIT test - previously ordered- patient declined card when offered from lab department  - CBC with platelets and differential  - CBC with platelets and differential  - TSH with free T4 reflex  - TSH with free T4 reflex      Review of the result(s) of each unique test - cbc,tsh, bmp   Ordering of each unique test  Prescription drug management         Patient Instructions   Follow up with nephrologist to recheck fatigue after dialysis  Follow up with us in 6 months   Continue medications as you have been taking       Return in about 6 months (around 4/12/2022), or if symptoms worsen or fail to improve, for in person.    [facility] [place]    Veronica Turner is a 45 year old who presents for the following health issues  accompanied by his phone Greenlandic     HPI     Patient is here for a follow up regarding his blood pressure.    Wonders if we can discontinue medications if blood pressure drops.  \"I feel very tired after dialysis\" -history of end stage renal disease-on outpatient dialysis Monday Wednesday Friday- I am seeing him on a " "Tuesday    Has Not informed nephrologist - history of end stage renal disease Saw neurosurgeon - told to have covid test - haven't heard anything back from that  History of subdural hemorrhage requiring craniotomy by neurosurgery   Nephrology would like dialysis access surgery since they are having problems with access but want input from neurosurgery before proceeding with the procedure and neurosurgeon is through [facility]    No chest pain or shortness of breath  Intermittent diarrhea sometimes doesnt' have any blood or black stools. Has not completed FIT testing.  Diarrhea alternates with normal stools.  He reports No weight changes but see below  Wt Readings from Last 4 Encounters:   10/12/21 49.4 kg (109 lb)   09/17/21 49.7 kg (109 lb 9.6 oz)   09/15/21 50.8 kg (112 lb)   09/02/21 50.5 kg (111 lb 6.4 oz)               Review of Systems   Constitutional, HEENT, cardiovascular, pulmonary, gi and gu systems are negative, except as otherwise noted.      Objective    /82   Pulse 80   Temp 98.7  F (37.1  C) (Tympanic)   Resp 18   Ht 1.702 m (5' 7\")   Wt 49.4 kg (109 lb)   SpO2 99%   BMI 17.07 kg/m    Body mass index is 17.07 kg/m .  Physical Exam   GENERAL: alert, no distress, frail and appears older than stated age  NECK: no adenopathy, no asymmetry, masses, or scars and thyroid normal to palpation  RESP: lungs clear to auscultation - no rales, rhonchi or wheezes  CV: regular rate and rhythm, normal S1 S2, no S3 or S4, no murmur, click or rub, no peripheral edema and peripheral pulses strong  ABDOMEN: soft, nontender, no hepatosplenomegaly, no masses and bowel sounds normal  MS: no gross musculoskeletal defects noted, no edema  PSYCH: mentation appears normal, affect normal/bright    Results for orders placed or performed in visit on 10/12/21   Basic metabolic panel  (Ca, Cl, CO2, Creat, Gluc, K, Na, BUN)     Status: Abnormal   Result Value Ref Range    Sodium 130 (L) 133 - 144 mmol/L    Potassium " 4.5 3.4 - 5.3 mmol/L    Chloride 95 94 - 109 mmol/L    Carbon Dioxide (CO2) 23 20 - 32 mmol/L    Anion Gap 12 3 - 14 mmol/L    Urea Nitrogen 35 (H) 7 - 30 mg/dL    Creatinine 9.18 (HH) 0.66 - 1.25 mg/dL    Calcium 9.8 8.5 - 10.1 mg/dL    Glucose 91 70 - 99 mg/dL    GFR Estimate 6 (L) >60 mL/min/1.73m2   CBC with platelets and differential     Status: Abnormal   Result Value Ref Range    WBC Count 4.8 4.0 - 11.0 10e3/uL    RBC Count 3.95 (L) 4.40 - 5.90 10e6/uL    Hemoglobin 10.8 (L) 13.3 - 17.7 g/dL    Hematocrit 33.5 (L) 40.0 - 53.0 %    MCV 85 78 - 100 fL    MCH 27.3 26.5 - 33.0 pg    MCHC 32.2 31.5 - 36.5 g/dL    RDW 14.3 10.0 - 15.0 %    Platelet Count 251 150 - 450 10e3/uL    % Neutrophils 45 %    % Lymphocytes 37 %    % Monocytes 14 %    % Eosinophils 4 %    % Basophils 0 %    Absolute Neutrophils 2.2 1.6 - 8.3 10e3/uL    Absolute Lymphocytes 1.7 0.8 - 5.3 10e3/uL    Absolute Monocytes 0.7 0.0 - 1.3 10e3/uL    Absolute Eosinophils 0.2 0.0 - 0.7 10e3/uL    Absolute Basophils 0.0 0.0 - 0.2 10e3/uL   TSH with free T4 reflex     Status: Normal   Result Value Ref Range    TSH 3.86 0.40 - 4.00 mU/L   CBC with platelets and differential     Status: Abnormal    Narrative    The following orders were created for panel order CBC with platelets and differential.  Procedure                               Abnormality         Status                     ---------                               -----------         ------                     CBC with platelets and d...[[id number]]  Abnormal            Final result                 Please view results for these tests on the individual orders.

## 2021-10-12 ENCOUNTER — OFFICE VISIT (OUTPATIENT)
Dept: FAMILY MEDICINE | Facility: CLINIC | Age: 46
End: 2021-10-12
Payer: COMMERCIAL

## 2021-10-12 ENCOUNTER — APPOINTMENT (OUTPATIENT)
Dept: INTERPRETER SERVICES | Facility: CLINIC | Age: 46
End: 2021-10-12
Payer: COMMERCIAL

## 2021-10-12 VITALS
HEIGHT: 67 IN | OXYGEN SATURATION: 99 % | TEMPERATURE: 98.7 F | RESPIRATION RATE: 18 BRPM | HEART RATE: 80 BPM | BODY MASS INDEX: 17.11 KG/M2 | SYSTOLIC BLOOD PRESSURE: 128 MMHG | WEIGHT: 109 LBS | DIASTOLIC BLOOD PRESSURE: 82 MMHG

## 2021-10-12 DIAGNOSIS — N17.9 ACUTE RENAL FAILURE, UNSPECIFIED ACUTE RENAL FAILURE TYPE (H): ICD-10-CM

## 2021-10-12 DIAGNOSIS — R53.83 FATIGUE, UNSPECIFIED TYPE: ICD-10-CM

## 2021-10-12 DIAGNOSIS — I10 HYPERTENSION GOAL BP (BLOOD PRESSURE) < 140/90: Primary | ICD-10-CM

## 2021-10-12 LAB
ANION GAP SERPL CALCULATED.3IONS-SCNC: 12 MMOL/L (ref 3–14)
BASOPHILS # BLD AUTO: 0 10E3/UL (ref 0–0.2)
BASOPHILS NFR BLD AUTO: 0 %
BUN SERPL-MCNC: 35 MG/DL (ref 7–30)
CALCIUM SERPL-MCNC: 9.8 MG/DL (ref 8.5–10.1)
CHLORIDE BLD-SCNC: 95 MMOL/L (ref 94–109)
CO2 SERPL-SCNC: 23 MMOL/L (ref 20–32)
CREAT SERPL-MCNC: 9.18 MG/DL (ref 0.66–1.25)
EOSINOPHIL # BLD AUTO: 0.2 10E3/UL (ref 0–0.7)
EOSINOPHIL NFR BLD AUTO: 4 %
ERYTHROCYTE [DISTWIDTH] IN BLOOD BY AUTOMATED COUNT: 14.3 % (ref 10–15)
GFR SERPL CREATININE-BSD FRML MDRD: 6 ML/MIN/1.73M2
GLUCOSE BLD-MCNC: 91 MG/DL (ref 70–99)
HCT VFR BLD AUTO: 33.5 % (ref 40–53)
HGB BLD-MCNC: 10.8 G/DL (ref 13.3–17.7)
LYMPHOCYTES # BLD AUTO: 1.7 10E3/UL (ref 0.8–5.3)
LYMPHOCYTES NFR BLD AUTO: 37 %
MCH RBC QN AUTO: 27.3 PG (ref 26.5–33)
MCHC RBC AUTO-ENTMCNC: 32.2 G/DL (ref 31.5–36.5)
MCV RBC AUTO: 85 FL (ref 78–100)
MONOCYTES # BLD AUTO: 0.7 10E3/UL (ref 0–1.3)
MONOCYTES NFR BLD AUTO: 14 %
NEUTROPHILS # BLD AUTO: 2.2 10E3/UL (ref 1.6–8.3)
NEUTROPHILS NFR BLD AUTO: 45 %
PLATELET # BLD AUTO: 251 10E3/UL (ref 150–450)
POTASSIUM BLD-SCNC: 4.5 MMOL/L (ref 3.4–5.3)
RBC # BLD AUTO: 3.95 10E6/UL (ref 4.4–5.9)
SODIUM SERPL-SCNC: 130 MMOL/L (ref 133–144)
TSH SERPL DL<=0.005 MIU/L-ACNC: 3.86 MU/L (ref 0.4–4)
WBC # BLD AUTO: 4.8 10E3/UL (ref 4–11)

## 2021-10-12 PROCEDURE — 36415 COLL VENOUS BLD VENIPUNCTURE: CPT | Performed by: PHYSICIAN ASSISTANT

## 2021-10-12 PROCEDURE — 85025 COMPLETE CBC W/AUTO DIFF WBC: CPT | Performed by: PHYSICIAN ASSISTANT

## 2021-10-12 PROCEDURE — 80048 BASIC METABOLIC PNL TOTAL CA: CPT | Performed by: PHYSICIAN ASSISTANT

## 2021-10-12 PROCEDURE — 84443 ASSAY THYROID STIM HORMONE: CPT | Performed by: PHYSICIAN ASSISTANT

## 2021-10-12 PROCEDURE — 99214 OFFICE O/P EST MOD 30 MIN: CPT | Performed by: PHYSICIAN ASSISTANT

## 2021-10-12 RX ORDER — LISINOPRIL 20 MG/1
20 TABLET ORAL DAILY
Qty: 90 TABLET | Refills: 0 | Status: SHIPPED | OUTPATIENT
Start: 2021-10-12 | End: 2021-11-17

## 2021-10-12 ASSESSMENT — MIFFLIN-ST. JEOR: SCORE: 1328.05

## 2021-10-12 NOTE — PATIENT INSTRUCTIONS
Follow up with nephrologist to recheck fatigue after dialysis  Follow up with us in 6 months   Continue medications as you have been taking

## 2021-10-13 ENCOUNTER — TELEPHONE (OUTPATIENT)
Dept: FAMILY MEDICINE | Facility: CLINIC | Age: 46
End: 2021-10-13

## 2021-10-13 DIAGNOSIS — D64.9 ANEMIA, UNSPECIFIED TYPE: ICD-10-CM

## 2021-10-13 DIAGNOSIS — Z99.2 ESRD ON HEMODIALYSIS (H): ICD-10-CM

## 2021-10-13 DIAGNOSIS — N18.6 ESRD ON HEMODIALYSIS (H): ICD-10-CM

## 2021-10-13 DIAGNOSIS — I10 HYPERTENSION GOAL BP (BLOOD PRESSURE) < 140/90: ICD-10-CM

## 2021-10-13 DIAGNOSIS — N17.9 ACUTE RENAL FAILURE, UNSPECIFIED ACUTE RENAL FAILURE TYPE (H): Primary | ICD-10-CM

## 2021-10-13 NOTE — TELEPHONE ENCOUNTER
----- Message from Sonam Walters sent at 10/13/2021  9:10 AM CDT -----    ----- Message -----  From: Maddi Nation PA-C  Sent: 10/13/2021   8:58 AM CDT  To: [facility]    Please call with Lao     Dear Johnny  Your sodium was a bit low but your potassium was normal.  Your hemoglobin was low and may be contributing to your fatigue  Your thyroid function was normal.   Please schedule an appointment with your nephrologist as soon as possible.  I also recommend an endoscopy and colonoscopy given anemia unless nephrologist recommends differently.  At the minimum we really need to do that stool card for screening for colon cancer.   Please call or MyChart my office with any questions or concerns.   Maddi Nation, PAC

## 2021-10-13 NOTE — TELEPHONE ENCOUNTER
Called and spoke to Johnny, read him providers message as written below in another note. He is in agreement with this, requested that RN call the neph clinic to ensure they will call him with an  to help him set up an appointment.     RN called Neph clinic and they have the referral there and will be giving him a call soon with an  to get this appointment set up.    Heather Casas RN      Diagnoses: Acute renal failure, unspecified acute renal failure type (H)   ESRD on hemodialysis (H)   Hypertension goal BP (blood pressure) < 140/90   Anemia, unspecified type   Order: Adult Nephrology Referral        Comment: anemic - ?related to renal disease or should we do endoscopy and colonoscopy       Please be aware that coverage of these services is subject to the terms and limitations of your health insurance plan.  Call member services at your health plan with any benefit or coverage questions.   [facility] [place] will call you to coordinate your care as prescribed by the provider.  If you don t hear from a representative within 2 business days, please call [phone].

## 2021-10-13 NOTE — RESULT ENCOUNTER NOTE
Please call with [place]     Dear Johnny  Your sodium was a bit low but your potassium was normal.  Your hemoglobin was low and may be contributing to your fatigue  Your thyroid function was normal.   Please schedule an appointment with your nephrologist as soon as possible.  I also recommend an endoscopy and colonoscopy given anemia unless nephrologist recommends differently.  At the minimum we really need to do that stool card for screening for colon cancer.   Please call or MyChart my office with any questions or concerns.   Maddi Nation, PAC

## 2021-10-20 ENCOUNTER — APPOINTMENT (OUTPATIENT)
Dept: INTERPRETER SERVICES | Facility: CLINIC | Age: 46
End: 2021-10-20
Payer: COMMERCIAL

## 2021-10-28 ENCOUNTER — IMMUNIZATION (OUTPATIENT)
Dept: NURSING | Facility: CLINIC | Age: 46
End: 2021-10-28
Payer: COMMERCIAL

## 2021-10-28 DIAGNOSIS — Z23 HIGH PRIORITY FOR 2019-NCOV VACCINE: Primary | ICD-10-CM

## 2021-10-28 PROCEDURE — 0004A COVID-19,PF,PFIZER (12+ YRS): CPT

## 2021-10-28 PROCEDURE — 91300 COVID-19,PF,PFIZER (12+ YRS): CPT

## 2021-10-28 PROCEDURE — 99207 PR NO CHARGE LOS: CPT

## 2021-11-17 ENCOUNTER — APPOINTMENT (OUTPATIENT)
Dept: INTERPRETER SERVICES | Facility: CLINIC | Age: 46
End: 2021-11-17
Payer: COMMERCIAL

## 2021-11-17 ENCOUNTER — CARE COORDINATION (OUTPATIENT)
Dept: MULTI SPECIALTY CLINIC | Facility: CLINIC | Age: 46
End: 2021-11-17
Payer: COMMERCIAL

## 2021-11-17 DIAGNOSIS — N17.9 ACUTE RENAL FAILURE, UNSPECIFIED ACUTE RENAL FAILURE TYPE (H): ICD-10-CM

## 2021-11-17 DIAGNOSIS — I10 HYPERTENSION GOAL BP (BLOOD PRESSURE) < 140/90: ICD-10-CM

## 2021-11-17 RX ORDER — LISINOPRIL 20 MG/1
20 TABLET ORAL DAILY
Qty: 90 TABLET | Refills: 3 | Status: SHIPPED | OUTPATIENT
Start: 2021-11-17 | End: 2021-11-30

## 2021-11-17 RX ORDER — CARVEDILOL 12.5 MG/1
12.5 TABLET ORAL 2 TIMES DAILY WITH MEALS
Qty: 180 TABLET | Refills: 3 | Status: SHIPPED | OUTPATIENT
Start: 2021-11-17 | End: 2022-10-13

## 2021-11-17 RX ORDER — AMLODIPINE BESYLATE 5 MG/1
5 TABLET ORAL DAILY
Qty: 90 TABLET | Refills: 3 | Status: ON HOLD | OUTPATIENT
Start: 2021-11-17 | End: 2022-02-14

## 2021-11-30 DIAGNOSIS — I10 HYPERTENSION GOAL BP (BLOOD PRESSURE) < 140/90: ICD-10-CM

## 2021-11-30 DIAGNOSIS — N17.9 ACUTE RENAL FAILURE, UNSPECIFIED ACUTE RENAL FAILURE TYPE (H): ICD-10-CM

## 2021-11-30 RX ORDER — LISINOPRIL 20 MG/1
20 TABLET ORAL DAILY
Qty: 90 TABLET | Refills: 3 | Status: SHIPPED | OUTPATIENT
Start: 2021-11-30 | End: 2022-06-20

## 2021-12-22 DIAGNOSIS — Z86.79 HISTORY OF SUBDURAL HEMATOMA: Primary | ICD-10-CM

## 2021-12-29 DIAGNOSIS — Z86.79 HISTORY OF SUBDURAL HEMATOMA: Primary | ICD-10-CM

## 2021-12-30 ENCOUNTER — OFFICE VISIT (OUTPATIENT)
Dept: NEUROSURGERY | Facility: CLINIC | Age: 46
End: 2021-12-30
Attending: PHYSICIAN ASSISTANT
Payer: COMMERCIAL

## 2021-12-30 ENCOUNTER — ANCILLARY PROCEDURE (OUTPATIENT)
Dept: CT IMAGING | Facility: CLINIC | Age: 46
End: 2021-12-30
Attending: STUDENT IN AN ORGANIZED HEALTH CARE EDUCATION/TRAINING PROGRAM
Payer: COMMERCIAL

## 2021-12-30 VITALS
BODY MASS INDEX: 17.75 KG/M2 | WEIGHT: 113.1 LBS | SYSTOLIC BLOOD PRESSURE: 149 MMHG | HEIGHT: 67 IN | HEART RATE: 87 BPM | DIASTOLIC BLOOD PRESSURE: 111 MMHG

## 2021-12-30 DIAGNOSIS — Z86.79 HISTORY OF SUBDURAL HEMATOMA: ICD-10-CM

## 2021-12-30 DIAGNOSIS — S06.5XAA SUBDURAL HEMATOMA (H): ICD-10-CM

## 2021-12-30 PROCEDURE — 70450 CT HEAD/BRAIN W/O DYE: CPT | Performed by: RADIOLOGY

## 2021-12-30 PROCEDURE — 99202 OFFICE O/P NEW SF 15 MIN: CPT | Performed by: STUDENT IN AN ORGANIZED HEALTH CARE EDUCATION/TRAINING PROGRAM

## 2021-12-30 ASSESSMENT — MIFFLIN-ST. JEOR: SCORE: 1341.65

## 2021-12-30 ASSESSMENT — PAIN SCALES - GENERAL: PAINLEVEL: NO PAIN (0)

## 2021-12-30 NOTE — PROGRESS NOTES
"HPI:  46 year old male with a recent history of a large right subdural hematoma requiring a craniotomy and evacuation after a fall in June 2021.  Since his discharge, he has had a great neurologic recovery and is now independent again.  He has a history of chronic renal failure requiring dialysis and now needs clearance for anticoagulation for a procedure.  He has no headaches and no neurological complaints at this time.  He is still on AEDs being managed by neurology but no further seizures.  Current Outpatient Medications   Medication     carvedilol (COREG) 12.5 MG tablet     lisinopril (ZESTRIL) 20 MG tablet     acetaminophen (TYLENOL) 500 MG tablet     amLODIPine (NORVASC) 5 MG tablet     calcium acetate (PHOSLO) 667 MG CAPS capsule     isoniazid (NYDRAZID) 300 MG tablet     levETIRAcetam (KEPPRA) 1000 MG tablet     senna-docusate (SENOKOT-S/PERICOLACE) 8.6-50 MG tablet     No current facility-administered medications for this visit.      Physical Exam:  Vital signs:      BP: (!) 149/111 Pulse: 87           Height: 170.2 cm (5' 7\") Weight: 51.3 kg (113 lb 1.6 oz)  Estimated body mass index is 17.71 kg/m  as calculated from the following:    Height as of this encounter: 1.702 m (5' 7\").    Weight as of this encounter: 51.3 kg (113 lb 1.6 oz).   PERRL, EOMI, Face symmetric, tongue midline, no pronator drift, full strength throughout UE, LE.  Incision well healed.  Results Reviewed:  I personally reviewed the images of multiple CT scans of the brain.  His most recent CT scan prior to today was 8/29 showing almost complete resolution of the hemorrhage.  The new CT scan from today shows stable post surgical changes from a right craniotomy but no new bleeding.  Assessment:  46 year old male with history of right SDH and craniotomy  Plan:  Patient is doing well post SDH.  At this time, his bleeding looks resolved and stable over 4 months.  While there is still some risk for bleeding with anticoagulation, this would be " minimal at this time and would not change significantly with waiting longer.  I believe the benefits of the procedure and anticoagulation needed to help with his dialysis needs outweigh the risks associated with the anticoagulation at this point.  He can follow up with neurosurgery as needed going forward.    Domingo Gamez MD

## 2021-12-30 NOTE — NURSING NOTE
"Johnny Bingham's goals for this visit include: consult  He requests these members of his care team be copied on today's visit information:     PCP: Kamila, [facility]    Referring Provider:  Maddi Nation PA-C  [id number]  [facility] [place]  [place] [address]    BP (!) 149/111   Pulse 87   Ht 1.702 m (5' 7\")   Wt 51.3 kg (113 lb 1.6 oz)   BMI 17.71 kg/m      Do you need any medication refills at today's visit? n  "

## 2022-01-05 ENCOUNTER — TELEPHONE (OUTPATIENT)
Dept: VASCULAR SURGERY | Facility: CLINIC | Age: 47
End: 2022-01-05
Payer: COMMERCIAL

## 2022-01-05 DIAGNOSIS — T82.898A PROBLEM WITH DIALYSIS ACCESS, INITIAL ENCOUNTER (H): Primary | ICD-10-CM

## 2022-01-05 NOTE — TELEPHONE ENCOUNTER
New vascular referral   Preferred Location: [facility]   Scheduling Instructions: Please call to schedule your appointment   Reason for Referral: This is a dialysis patient who needs a permanent vascular access. He has now been cleared by neurosurgery to proceed.     Referring provider: Candelaria Vo NP     Imaging needed? No.     Visit type: in person visit    Provider: Dr. Mandel    Appt notes: Consult for permanent dialysis access; Upper extremity vein mapping completed in June 2021    Additional info: Pt was worked up for dialysis access back in June. Was incidentally found to have a thyroid nodule requiring further workup. Pt then had a fall resulting in subdural hematoma requiring craniotomy and evacuation in June 2021. He has now been cleared by neurosurgery to proceed with dialysis access placement.     Documentation routed to MD for review.    SEGUNDO Aaltorre, RN  [facility]  Ph: [phone]  Fax: [phone]

## 2022-01-12 NOTE — TELEPHONE ENCOUNTER
I contacted the pt with a [place]  on the line to discuss the plan for his dialysis access.    We are hoping to proceed with percutenous dialysis access with [employer] technology, which is through [employer]. This will be completed with the interventional radiology team. The IR team is confirming that we will be able to bring in the device, and should know more by the end of the week.    I updated pt on the plan, and checked with him that he is comfortable waiting a bit while we work on this. He said he is fine with this plan and is not in a rush. I let him know we will be in touch soon.    SEGUNDO Alatorre, RN  [facility] V[facility] Surgery  Ph: [phone]  Fax: [phone]

## 2022-01-25 ENCOUNTER — APPOINTMENT (OUTPATIENT)
Dept: INTERPRETER SERVICES | Facility: CLINIC | Age: 47
End: 2022-01-25
Payer: COMMERCIAL

## 2022-01-26 DIAGNOSIS — Z99.2 ESRD ON HEMODIALYSIS (H): Primary | ICD-10-CM

## 2022-01-26 DIAGNOSIS — N18.6 ESRD ON HEMODIALYSIS (H): Primary | ICD-10-CM

## 2022-01-31 NOTE — TELEPHONE ENCOUNTER
DIAGNOSIS: New pt  consult for dialysis access.   Please use ipad or phones to reach  at [phone] option 6   DATE RECEIVED: 2.3.22   NOTES STATUS DETAILS   OFFICE NOTE from referring provider Internal 1.5.22  Candelaria Vo NP  [facility] Nephrology   OFFICE NOTE from other specialist Internal 1.5.22  Tatiana Foster RN  [facility] Vascular/IR    12.30.21  Dr. Domingo Gamez  [facility] Neurosurgery   OPERATIVE REPORT na    MEDICATION LIST Internal    PERTINENT LABS Internal    CTA (CT ANGIOGRAPHY) na    CT Internal 12.30.21  CT Head    5.14.21  CT Chest   MRI na    ULTRASOUND Internal 10.6.21  IR CVC Tunnel Revision Right    6.22.21  US Upper Ext Venous Mapping Kyle

## 2022-02-01 ENCOUNTER — ANCILLARY PROCEDURE (OUTPATIENT)
Dept: ULTRASOUND IMAGING | Facility: CLINIC | Age: 47
End: 2022-02-01
Attending: RADIOLOGY
Payer: COMMERCIAL

## 2022-02-01 DIAGNOSIS — N18.6 ESRD ON HEMODIALYSIS (H): ICD-10-CM

## 2022-02-01 DIAGNOSIS — Z99.2 ESRD ON HEMODIALYSIS (H): ICD-10-CM

## 2022-02-01 PROCEDURE — 93970 EXTREMITY STUDY: CPT | Performed by: RADIOLOGY

## 2022-02-02 NOTE — PROGRESS NOTES
Patient arrived to clinic today as he thought his appt was today, instead it is next Friday, 9/17/21.  On his AVS it says to return around 9/10.    Patient is taking the increased dose of lisinopril as directed by Maddi Nation PA-C on 8/13/21.  He is taking Coreg 3.125 mg 2 tabs twice daily as directed.    The patient denies headaches, vision changes, lightheadedness, dizziness.    Hedy Ramirez RN, [facility]     normal strength

## 2022-02-03 ENCOUNTER — TELEPHONE (OUTPATIENT)
Dept: VASCULAR SURGERY | Facility: CLINIC | Age: 47
End: 2022-02-03

## 2022-02-03 ENCOUNTER — VIRTUAL VISIT (OUTPATIENT)
Dept: VASCULAR SURGERY | Facility: CLINIC | Age: 47
End: 2022-02-03
Attending: NURSE PRACTITIONER
Payer: COMMERCIAL

## 2022-02-03 ENCOUNTER — PRE VISIT (OUTPATIENT)
Dept: VASCULAR SURGERY | Facility: CLINIC | Age: 47
End: 2022-02-03

## 2022-02-03 DIAGNOSIS — Z11.59 ENCOUNTER FOR SCREENING FOR OTHER VIRAL DISEASES: Primary | ICD-10-CM

## 2022-02-03 DIAGNOSIS — T82.898A PROBLEM WITH DIALYSIS ACCESS, INITIAL ENCOUNTER (H): ICD-10-CM

## 2022-02-03 PROCEDURE — 99204 OFFICE O/P NEW MOD 45 MIN: CPT | Mod: 95 | Performed by: RADIOLOGY

## 2022-02-03 NOTE — PROGRESS NOTES
[facility]      Type of Visit: Virtual: [facility]    Patient is here for a new visit to discuss dialysis access.     Vitals - Patient Reported  Systolic (Patient Reported): 137  Diastolic (Patient Reported): (!) 98  Pain Score: No Pain (0)          Questions patient would like addressed today are: Patient verbalized no questions/concerns, this has been communicated to the provider.     Refills are needed: No    How would you like to obtain your AVS? Mail a copy    Juvencio Wilder MA

## 2022-02-03 NOTE — LETTER
February 3, 2022    Johnny Bingham  [address]  [place] [address]    Dear Johnny,     .ir

## 2022-02-03 NOTE — PATIENT INSTRUCTIONS
Johnny you have had your virtual consult today with Dr Iqbal IR/Vascular to discuss fistula access creation procedure for dialysis.  Your ultrasound completed yesterday has been reviewed with you during this visit.    Plan:      We will contact you to schedule your procedure.  Your fistula will need a month to heal prior to use.      Please don't hesitate to call with questions or concerns.    MENA Johnson RN, BSN  Interventional Radiology Nurse Coordinator   Phone:  [phone]

## 2022-02-03 NOTE — LETTER
2/3/2022       RE: Johnny Bingham  [address]  [facility] [address]     Dear Colleague,    Thank you for referring your patient, Johnny Bingham, to the [facility]ASCULAR [facility] [place] at [facility]. Please see a copy of my visit note below.        INTERVENTIONAL RADIOLOGY CONSULTATION    Name: Johnny Bingham  Age: 46 year old   Referring Physician: Candelaria Vo NP  REASON FOR REFERRAL: AVF creation     HPI: 46 year old male with history of ESRD on HD (MWF), HTN, and large right SDH s/p evacuation after fall June, 2021.     Patient presents for consultation regarding percutaneous AV fistula creation (pAVF) for dialysis access.     Today, patient reports he is doing well. No new health concerns. No headaches or concerns regarding his prior SDH.  Denies arm swelling.    PAST MEDICAL HISTORY:   Past Medical History:   Diagnosis Date     CKD (chronic kidney disease) stage 5, GFR less than 15 ml/min (H)      Hypertension        PAST SURGICAL HISTORY:   Past Surgical History:   Procedure Laterality Date     IR CVC TUNNEL PLACEMENT > 5 YRS OF AGE  5/10/2021     IR CVC TUNNEL REVISION RIGHT  10/6/2021     NO HISTORY OF SURGERY         FAMILY HISTORY:   Family History   Problem Relation Age of Onset     Heart Disease Mother      Kidney Disease Father      Heart Disease Father      Diabetes Brother      Colon Cancer No family hx of      Prostate Cancer No family hx of        SOCIAL HISTORY:   Social History     Tobacco Use     Smoking status: Current Every Day Smoker     Packs/day: 0.25     Types: Cigarettes     Smokeless tobacco: Never Used     Tobacco comment: quit 6 months ago -1/2020    Substance Use Topics     Alcohol use: Never       PROBLEM LIST:   Patient Active Problem List    Diagnosis Date Noted     Episode of unresponsiveness 09/17/2021     Priority: Medium     Spell of unresponsiveness Jul 2021 approx ten days following evacuation of SDH/SAH x 2. Thought  to be due to seizure and started on levetiracetam. EEG shortly afterwards without epileptiform discharges (second hand discontinue summary). Documentation of original impressions not available. CRF DD, levetiracetam 1000 mg per day. Elected to continue Rx x 6 months, repeat EEG and discontinue if appropriate.       ESRD on hemodialysis (H) 09/10/2021     Priority: Medium     History of subdural hematoma 09/10/2021     Priority: Medium     CKD (chronic kidney disease) stage 5, GFR less than 15 ml/min (H) 07/21/2021     Priority: Medium     Renal hypertension 09/16/2020     Priority: Medium     Acute renal failure (ARF) (H) 09/09/2020     Priority: Medium       MEDICATIONS:   Prescription Medications as of 2/3/2022       Rx Number Disp Refills Start End Last Dispensed Date Next Fill Date Owning Pharmacy    acetaminophen (TYLENOL) 500 MG tablet    7/21/2021    [facility] [address] [place] [address]    Sig: Take 1,000 mg by mouth    Class: Historical    Route: Oral    amLODIPine (NORVASC) 5 MG tablet  90 tablet 3 11/17/2021    [facility] [address] [place] [address]    Sig: Take 1 tablet (5 mg) by mouth daily    Class: E-Prescribe    Route: Oral    calcium acetate (PHOSLO) 667 MG CAPS capsule    7/21/2021    [facility] [address] [place] [address]    Sig: Take 2,001 mg by mouth    Class: Historical    Route: Oral    carvedilol (COREG) 12.5 MG tablet  180 tablet 3 11/17/2021    [facility] [address] [place] [address]    Sig: Take 1 tablet (12.5 mg) by mouth 2 times daily (with meals)    Class: E-Prescribe    Route: Oral    isoniazid (NYDRAZID) 300 MG tablet  90 tablet 0 5/18/2021    [facility] [address] [place] [address]    Sig: Take 1 tablet (300 mg) by mouth daily    Class: E-Prescribe    Notes to Pharmacy: Please ignore the previous perscription    Route: Oral    levETIRAcetam (KEPPRA) 1000 MG tablet  31 tablet 11 9/2/2021    [facility] [address] [place] [address]    Sig: Take 1 tablet (1,000 mg) by mouth At Bedtime    Class: E-Prescribe    Route: Oral    lisinopril (ZESTRIL) 20 MG tablet  90 tablet 3 11/30/2021    [address]    Sig: Take 1 tablet (20 mg) by mouth daily    Class: E-Prescribe    Route: Oral    senna-docusate (SENOKOT-S/PERICOLACE) 8.6-50 MG tablet  60 tablet 0 9/10/2021    [address]    Sig: Take 1-2 tablets by mouth daily as needed for constipation    Class: E-Prescribe    Route: Oral          ALLERGIES:   Patient has no known allergies.    ROS:  Negative unless otherwise stated in HPI.      Physical Examination: Video visit.  Visit is conducted with the assistance of a Tongan .   GENERAL: Healthy, alert and no distress  EYES: Eyes grossly normal to inspection.  No discharge or erythema, or obvious scleral/conjunctival abnormalities.  RESP: Breathing comfortably on room air.  No audible wheeze, cough, or visible cyanosis.  No visible retractions or increased work of breathing.    SKIN: Visible skin clear. No significant rash, abnormal pigmentation or lesions.  NEURO: Cranial nerves grossly intact.  Mentation and speech appropriate for age.  PSYCH: Mentation appears normal, affect normal/bright, judgement and insight intact, normal speech and appearance well-groomed.    RIGHT ARM: no swelling, discoloration, wounds, or areas of erythema.     Labs:    BMP RESULTS:  Lab Results   Component Value Date     (L) 10/12/2021     05/17/2021    POTASSIUM 4.5 10/12/2021    POTASSIUM 3.5 05/17/2021    CHLORIDE 95 10/12/2021    CHLORIDE 104 05/17/2021    CO2 23 10/12/2021    CO2 28 05/17/2021    ANIONGAP 12 10/12/2021    ANIONGAP 5 05/17/2021    GLC 91 10/12/2021    GLC 95 05/17/2021    BUN 35 (H) 10/12/2021    BUN 32 (H) 05/17/2021    CR 9.18 (HH) 10/12/2021    CR 4.71 (H) 05/17/2021    GFRESTIMATED 6 (L)  10/12/2021    GFRESTIMATED 14 (L) 05/17/2021    GFRESTBLACK 16 (L) 05/17/2021    BEAU 9.8 10/12/2021    BEAU 7.8 (L) 05/17/2021        CBC RESULTS:  Lab Results   Component Value Date    WBC 4.8 10/12/2021    WBC 8.1 05/13/2021    RBC 3.95 (L) 10/12/2021    RBC 2.74 (L) 05/13/2021    HGB 10.8 (L) 10/12/2021    HGB 8.1 (L) 05/17/2021    HCT 33.5 (L) 10/12/2021    HCT 24.3 (L) 05/13/2021    MCV 85 10/12/2021    MCV 89 05/13/2021    MCH 27.3 10/12/2021    MCH 27.7 05/13/2021    MCHC 32.2 10/12/2021    MCHC 31.3 (L) 05/13/2021    RDW 14.3 10/12/2021    RDW 13.9 05/13/2021     10/12/2021     05/13/2021       INR/PTT:  Lab Results   Component Value Date    INR 1.02 05/10/2021       Diagnostic studies: Personally reviewed and interpreted.     UE Venous mapping 2/1/22: The patient's LEFT arm is not suitable for Ellypsis AVF creation.     The patient's RIGHT arm is suitable for Ellypsis AVF creation as follows:    The  vein in the right antecubital fossa measured 1.8 mm. However, upon personal review of the images, this is somewhat underestimated due to caliper positioning and therefore appears to be within the margin of error and can be considered at least 2 mm diameter. Otherwise, his basilic vein measures 2.2mm and is 2 mm deep to the skin at antecubital fossa, 3 mm diameter and 3 mm deep to the skin at distal upper arm, 3.1 mm diameter and 3 mm deep to the skin at mid upper arm and 6.1 mm diameter, 1 mm deep to the skin at proximal upper arm.  The radial artery measures 2.9 mm, and the  vein is 1.4 mm from the radial artery.    Central veins are patent without stenosis identified or suspected.    Assessment: 46 year old male with history of ESRD on HD (MWF), HTN, and large right SDH s/p evacuation after fall June, 2021. Patient presents for consultation regarding percutaneous AV fistula creation (pAVF) for dialysis access.     The patient's imaging workup thus far shows that he meets  criteria for Ellipsys pAVF creation in the right arm. Of note, his UE mapping US did show that the  vein in the right antecubital fossa measured 1.8 mm. However, upon personal review of the images this appears to be within the range of error and the vein could be easily distended beyond 2 mm with appropriate hydration and positioning the day of the procedure. Otherwise, his basilic vein is adequate size and distance from the skin, the radial artery measures 2.9 mm, and the  vein is 1.4 mm from the radial artery. All of these features meet Ellipsys criteria.     After the procedure, we will typically place the patient on a 1 month course of plavix. The patient does have a history of SDH s/p evacuation June, 2021. He was recently seen by Dr. Gamez of neurosurgery who deemed him fit to receive plavix in the setting of his history of subdural hematoma.     We discussed the procedure with the patient. We explained that the procedure was performed in ultrasound guidance with local anesthesia and moderate sedation. A small puncture is made into a  vein and a wire and catheter are used to cross the targeted vein and artery in the arm. Next, the Ellypsis catheter is advanced across the artery and vein and the catheter is used to create the AV fistula. The  vein is then dilated with a small balloon and the fistula is ready to mature. We explained to the patient that this AVF may take about 4 weeks to mature during which time it they would require dialysis via alternative access (existing tunneled dialysis catheter). There is a possibility that the AVF may require additional interventions to maintain patency in the future. If the patient undergoes this method of AVF creation, it would not preclude them from pursuing surgical AVF creation.     We discussed the risks with the patient, including bleeding, infection, and the possibility that the fistula may not be suitable for dialysis and  that they may require surgical revision or abandonment of the fistula and creation of a new fistula. The patient's questions were answered, they were amenable to the plan.      Plan:  -We will schedule the patient for pAVF creation with Vijaya as an outpatient with moderate sedation.  -Patient will be placed on Plavix after AVF creation.  -Pre-procedural COVID test.      Shimon Epstein MD  Interventional Radiology Fellow, PGY-5.      Physician Attestation   I, Faisal Iqbal MD, saw this patient and agree with the findings and plan of care as documented in the note.      Items personally reviewed/procedural attestation: imaging and agree with the interpretation documented in the note.    Faisal Iqbal MD  Interventional Radiology and Vascular Imaging Attending  Department of Radiology  [facility]        Problem with dialysis access, initial encounter (H)    - Vascular Medicine Referral  - IR Endo AV Fistula Creation; Future     Review of the result(s) of each unique test - Bilateral upper extremity venous mapping for AV fistula 2/1/2022  Independent interpretation of a test performed by another physician/other qualified health care professional (not separately reported) - Bilateral upper extremity venous mapping for AV fistula 2/1/2022                  Video-Visit Details     Type of service:  Video Visit     Video Start and End Time:10:59 - 11:20 AM    Originating Location (pt. Location): Home     Distant Location (provider location):  [facility] VASCULAR [facility] [place]      Platform used for Video Visit: Manjula TERRELL  Patient Care Team:  [facility] as PCP - Micheline Edgar DO as Assigned Nephrology Provider  Isabel Owen RN as Specialty Care Coordinator (Nephrology)  Khalida Dennis MD as Assigned Surgical Provider  Maddi Nation PA-C as Assigned PCP  Aretha Galo MD as Assigned Endocrinology  Provider  Domingo Gamez MD as Assigned Neuroscience Provider  ARY JOHNSON

## 2022-02-03 NOTE — NURSING NOTE
Chief Complaint   Patient presents with     Consult     dialysis access, no questions/concerns at time of rooming     Roomed pt using Interp. ID [id number]    Reviewed medications with pt    LIANE Jain/SHANNON

## 2022-02-03 NOTE — PROGRESS NOTES
INTERVENTIONAL RADIOLOGY CONSULTATION    Name: Johnny Bingham  Age: 46 year old   Referring Physician: Candelaria Vo NP  REASON FOR REFERRAL: AVF creation     HPI: 46 year old male with history of ESRD on HD (MWF), HTN, and large right SDH s/p evacuation after fall June, 2021.     Patient presents for consultation regarding percutaneous AV fistula creation (pAVF) for dialysis access.     Today, patient reports he is doing well. No new health concerns. No headaches or concerns regarding his prior SDH.  Denies arm swelling.    PAST MEDICAL HISTORY:   Past Medical History:   Diagnosis Date     CKD (chronic kidney disease) stage 5, GFR less than 15 ml/min (H)      Hypertension        PAST SURGICAL HISTORY:   Past Surgical History:   Procedure Laterality Date     IR CVC TUNNEL PLACEMENT > 5 YRS OF AGE  5/10/2021     IR CVC TUNNEL REVISION RIGHT  10/6/2021     NO HISTORY OF SURGERY         FAMILY HISTORY:   Family History   Problem Relation Age of Onset     Heart Disease Mother      Kidney Disease Father      Heart Disease Father      Diabetes Brother      Colon Cancer No family hx of      Prostate Cancer No family hx of        SOCIAL HISTORY:   Social History     Tobacco Use     Smoking status: Current Every Day Smoker     Packs/day: 0.25     Types: Cigarettes     Smokeless tobacco: Never Used     Tobacco comment: quit 6 months ago -1/2020    Substance Use Topics     Alcohol use: Never       PROBLEM LIST:   Patient Active Problem List    Diagnosis Date Noted     Episode of unresponsiveness 09/17/2021     Priority: Medium     Spell of unresponsiveness Jul 2021 approx ten days following evacuation of SDH/SAH x 2. Thought to be due to seizure and started on levetiracetam. EEG shortly afterwards without epileptiform discharges (second hand discontinue summary). Documentation of original impressions not available. CRF DD, levetiracetam 1000 mg per day. Elected to continue Rx x 6 months, repeat EEG and discontinue if  appropriate.       ESRD on hemodialysis (H) 09/10/2021     Priority: Medium     History of subdural hematoma 09/10/2021     Priority: Medium     CKD (chronic kidney disease) stage 5, GFR less than 15 ml/min (H) 07/21/2021     Priority: Medium     Renal hypertension 09/16/2020     Priority: Medium     Acute renal failure (ARF) (H) 09/09/2020     Priority: Medium       MEDICATIONS:   Prescription Medications as of 2/3/2022       Rx Number Disp Refills Start End Last Dispensed Date Next Fill Date Owning Pharmacy    acetaminophen (TYLENOL) 500 MG tablet    7/21/2021    [facility] [address] [place] [address] [address]    Sig: Take 1,000 mg by mouth    Class: Historical    Route: Oral    amLODIPine (NORVASC) 5 MG tablet  90 tablet 3 11/17/2021    [facility] [address] [place] [address] [address]    Sig: Take 1 tablet (5 mg) by mouth daily    Class: E-Prescribe    Route: Oral    calcium acetate (PHOSLO) 667 MG CAPS capsule    7/21/2021    [facility] [address] [place] [address] [address]    Sig: Take 2,001 mg by mouth    Class: Historical    Route: Oral    carvedilol (COREG) 12.5 MG tablet  180 tablet 3 11/17/2021    [facility] [address] [place] [address] [address]    Sig: Take 1 tablet (12.5 mg) by mouth 2 times daily (with meals)    Class: E-Prescribe    Route: Oral    isoniazid (NYDRAZID) 300 MG tablet  90 tablet 0 5/18/2021    [facility] [address] [place] [address] [address]    Sig: Take 1 tablet (300 mg) by mouth daily    Class: E-Prescribe    Notes to Pharmacy: Please ignore the previous perscription    Route: Oral    levETIRAcetam (KEPPRA) 1000 MG tablet  31 tablet 11 9/2/2021    [facility] [address] [place] [address] [place] [address] [address]    Sig: Take 1 tablet (1,000 mg) by mouth At Bedtime    Class: E-Prescribe    Route: Oral    lisinopril (ZESTRIL) 20 MG tablet  90 tablet 3 11/30/2021    [facility] [address] [place] [address] [place] [address] [address]    Sig: Take 1 tablet (20 mg) by mouth  daily    Class: E-Prescribe    Route: Oral    senna-docusate (SENOKOT-S/PERICOLACE) 8.6-50 MG tablet  60 tablet 0 9/10/2021    [facility] [address] [place] [address] [address] [place] [address]    Sig: Take 1-2 tablets by mouth daily as needed for constipation    Class: E-Prescribe    Route: Oral          ALLERGIES:   Patient has no known allergies.    ROS:  Negative unless otherwise stated in HPI.      Physical Examination: Video visit.  Visit is conducted with the assistance of a Tam[place] .   GENERAL: Healthy, alert and no distress  EYES: Eyes grossly normal to inspection.  No discharge or erythema, or obvious scleral/conjunctival abnormalities.  RESP: Breathing comfortably on room air.  No audible wheeze, cough, or visible cyanosis.  No visible retractions or increased work of breathing.    SKIN: Visible skin clear. No significant rash, abnormal pigmentation or lesions.  NEURO: Cranial nerves grossly intact.  Mentation and speech appropriate for age.  PSYCH: Mentation appears normal, affect normal/bright, judgement and insight intact, normal speech and appearance well-groomed.    RIGHT ARM: no swelling, discoloration, wounds, or areas of erythema.     Labs:    BMP RESULTS:  Lab Results   Component Value Date     (L) 10/12/2021     05/17/2021    POTASSIUM 4.5 10/12/2021    POTASSIUM 3.5 05/17/2021    CHLORIDE 95 10/12/2021    CHLORIDE 104 05/17/2021    CO2 23 10/12/2021    CO2 28 05/17/2021    ANIONGAP 12 10/12/2021    ANIONGAP 5 05/17/2021    GLC 91 10/12/2021    GLC 95 05/17/2021    BUN 35 (H) 10/12/2021    BUN 32 (H) 05/17/2021    CR 9.18 (HH) 10/12/2021    CR 4.71 (H) 05/17/2021    GFRESTIMATED 6 (L) 10/12/2021    GFRESTIMATED 14 (L) 05/17/2021    GFRESTBLACK 16 (L) 05/17/2021    BEAU 9.8 10/12/2021    BEAU 7.8 (L) 05/17/2021        CBC RESULTS:  Lab Results   Component Value Date    WBC 4.8 10/12/2021    WBC 8.1 05/13/2021    RBC 3.95 (L) 10/12/2021    RBC 2.74 (L) 05/13/2021    HGB 10.8 (L)  10/12/2021    HGB 8.1 (L) 05/17/2021    HCT 33.5 (L) 10/12/2021    HCT 24.3 (L) 05/13/2021    MCV 85 10/12/2021    MCV 89 05/13/2021    MCH 27.3 10/12/2021    MCH 27.7 05/13/2021    MCHC 32.2 10/12/2021    MCHC 31.3 (L) 05/13/2021    RDW 14.3 10/12/2021    RDW 13.9 05/13/2021     10/12/2021     05/13/2021       INR/PTT:  Lab Results   Component Value Date    INR 1.02 05/10/2021       Diagnostic studies: Personally reviewed and interpreted.     UE Venous mapping 2/1/22: The patient's LEFT arm is not suitable for Ellypsis AVF creation.     The patient's RIGHT arm is suitable for Ellypsis AVF creation as follows:    The  vein in the right antecubital fossa measured 1.8 mm. However, upon personal review of the images, this is somewhat underestimated due to caliper positioning and therefore appears to be within the margin of error and can be considered at least 2 mm diameter. Otherwise, his basilic vein measures 2.2mm and is 2 mm deep to the skin at antecubital fossa, 3 mm diameter and 3 mm deep to the skin at distal upper arm, 3.1 mm diameter and 3 mm deep to the skin at mid upper arm and 6.1 mm diameter, 1 mm deep to the skin at proximal upper arm.  The radial artery measures 2.9 mm, and the  vein is 1.4 mm from the radial artery.    Central veins are patent without stenosis identified or suspected.    Assessment: 46 year old male with history of ESRD on HD (MWF), HTN, and large right SDH s/p evacuation after fall June, 2021. Patient presents for consultation regarding percutaneous AV fistula creation (pAVF) for dialysis access.     The patient's imaging workup thus far shows that he meets criteria for Ellipsys pAVF creation in the right arm. Of note, his UE mapping US did show that the  vein in the right antecubital fossa measured 1.8 mm. However, upon personal review of the images this appears to be within the range of error and the vein could be easily distended beyond 2  mm with appropriate hydration and positioning the day of the procedure. Otherwise, his basilic vein is adequate size and distance from the skin, the radial artery measures 2.9 mm, and the  vein is 1.4 mm from the radial artery. All of these features meet Ellipsys criteria.     After the procedure, we will typically place the patient on a 1 month course of plavix. The patient does have a history of SDH s/p evacuation June, 2021. He was recently seen by Dr. Gamez of neurosurgery who deemed him fit to receive plavix in the setting of his history of subdural hematoma.     We discussed the procedure with the patient. We explained that the procedure was performed in ultrasound guidance with local anesthesia and moderate sedation. A small puncture is made into a  vein and a wire and catheter are used to cross the targeted vein and artery in the arm. Next, the Ellypsis catheter is advanced across the artery and vein and the catheter is used to create the AV fistula. The  vein is then dilated with a small balloon and the fistula is ready to mature. We explained to the patient that this AVF may take about 4 weeks to mature during which time it they would require dialysis via alternative access (existing tunneled dialysis catheter). There is a possibility that the AVF may require additional interventions to maintain patency in the future. If the patient undergoes this method of AVF creation, it would not preclude them from pursuing surgical AVF creation.     We discussed the risks with the patient, including bleeding, infection, and the possibility that the fistula may not be suitable for dialysis and that they may require surgical revision or abandonment of the fistula and creation of a new fistula. The patient's questions were answered, they were amenable to the plan.      Plan:  -We will schedule the patient for pAVF creation with Ellypsis as an outpatient with moderate sedation.  -Patient  will be placed on Plavix after AVF creation.  -Pre-procedural COVID test.      Shimon Epstein MD  Interventional Radiology Fellow, PGY-5.      Physician Attestation   I, Faisal Iqbal MD, saw this patient and agree with the findings and plan of care as documented in the note.      Items personally reviewed/procedural attestation: imaging and agree with the interpretation documented in the note.    Faisal Iqbal MD  Interventional Radiology and Vascular Imaging Attending  Department of Radiology  [facility]        Problem with dialysis access, initial encounter (H)    - Vascular Medicine Referral  - IR Endo AV Fistula Creation; Future     Review of the result(s) of each unique test - Bilateral upper extremity venous mapping for AV fistula 2/1/2022  Independent interpretation of a test performed by another physician/other qualified health care professional (not separately reported) - Bilateral upper extremity venous mapping for AV fistula 2/1/2022                  Video-Visit Details     Type of service:  Video Visit     Video Start and End Time:10:59 - 11:20 AM    Originating Location (pt. Location): Home     Distant Location (provider location):  [facility] VASCULAR CLIN[facility] [place]      Platform used for Video Visit: Manjula       Patient Care Team:  [facility] as PCP - Micheline Edgar DO as Assigned Nephrology Provider  Isabel Owen RN as Specialty Care Coordinator (Nephrology)  Khalida Dennis MD as Assigned Surgical Provider  Maddi Nation PA-C as Assigned PCP  Aretha Galo MD as Assigned Endocrinology Provider  Domingo Gamez MD as Assigned Neuroscience Provider  ARY JOHNSON

## 2022-02-07 NOTE — TELEPHONE ENCOUNTER
I called with the aid of a Jordanian .  Pt has been updated with procedure details for 2/14/22 with Dr Iqbal, he will need a covid test this week Friday and all questions answered.  MENA Johnson RN, BSN  Interventional Radiology Nurse Coordinator   Phone:  [phone]

## 2022-02-09 DIAGNOSIS — N18.6 ESRD ON HEMODIALYSIS (H): ICD-10-CM

## 2022-02-09 DIAGNOSIS — Z99.2 ESRD ON HEMODIALYSIS (H): ICD-10-CM

## 2022-02-09 DIAGNOSIS — N18.5 CKD (CHRONIC KIDNEY DISEASE) STAGE 5, GFR LESS THAN 15 ML/MIN (H): Primary | ICD-10-CM

## 2022-02-11 ENCOUNTER — LAB (OUTPATIENT)
Dept: LAB | Facility: CLINIC | Age: 47
End: 2022-02-11
Attending: RADIOLOGY
Payer: COMMERCIAL

## 2022-02-11 DIAGNOSIS — Z11.59 ENCOUNTER FOR SCREENING FOR OTHER VIRAL DISEASES: ICD-10-CM

## 2022-02-11 PROCEDURE — U0005 INFEC AGEN DETEC AMPLI PROBE: HCPCS

## 2022-02-11 PROCEDURE — U0003 INFECTIOUS AGENT DETECTION BY NUCLEIC ACID (DNA OR RNA); SEVERE ACUTE RESPIRATORY SYNDROME CORONAVIRUS 2 (SARS-COV-2) (CORONAVIRUS DISEASE [COVID-19]), AMPLIFIED PROBE TECHNIQUE, MAKING USE OF HIGH THROUGHPUT TECHNOLOGIES AS DESCRIBED BY CMS-2020-01-R: HCPCS

## 2022-02-12 LAB — SARS-COV-2 RNA RESP QL NAA+PROBE: NEGATIVE

## 2022-02-14 ENCOUNTER — HOSPITAL ENCOUNTER (OUTPATIENT)
Facility: CLINIC | Age: 47
Discharge: HOME | End: 2022-02-14
Attending: RADIOLOGY | Admitting: RADIOLOGY
Payer: COMMERCIAL

## 2022-02-14 ENCOUNTER — APPOINTMENT (OUTPATIENT)
Dept: MEDSURG UNIT | Facility: CLINIC | Age: 47
End: 2022-02-14
Attending: RADIOLOGY
Payer: COMMERCIAL

## 2022-02-14 ENCOUNTER — APPOINTMENT (OUTPATIENT)
Dept: INTERVENTIONAL RADIOLOGY/VASCULAR | Facility: CLINIC | Age: 47
End: 2022-02-14
Attending: RADIOLOGY
Payer: COMMERCIAL

## 2022-02-14 ENCOUNTER — APPOINTMENT (OUTPATIENT)
Dept: INTERPRETER SERVICES | Facility: CLINIC | Age: 47
End: 2022-02-14
Attending: RADIOLOGY
Payer: COMMERCIAL

## 2022-02-14 VITALS
RESPIRATION RATE: 15 BRPM | HEIGHT: 67 IN | OXYGEN SATURATION: 100 % | HEART RATE: 58 BPM | TEMPERATURE: 97.9 F | BODY MASS INDEX: 17.99 KG/M2 | WEIGHT: 114.64 LBS | DIASTOLIC BLOOD PRESSURE: 95 MMHG | SYSTOLIC BLOOD PRESSURE: 136 MMHG

## 2022-02-14 DIAGNOSIS — T82.898A PROBLEM WITH DIALYSIS ACCESS, INITIAL ENCOUNTER (H): ICD-10-CM

## 2022-02-14 LAB
ERYTHROCYTE [DISTWIDTH] IN BLOOD BY AUTOMATED COUNT: 12.8 % (ref 10–15)
HCT VFR BLD AUTO: 38.2 % (ref 40–53)
HGB BLD-MCNC: 12.1 G/DL (ref 13.3–17.7)
INR PPP: 0.93 (ref 0.85–1.15)
MCH RBC QN AUTO: 28.1 PG (ref 26.5–33)
MCHC RBC AUTO-ENTMCNC: 31.7 G/DL (ref 31.5–36.5)
MCV RBC AUTO: 89 FL (ref 78–100)
PLATELET # BLD AUTO: 187 10E3/UL (ref 150–450)
RBC # BLD AUTO: 4.3 10E6/UL (ref 4.4–5.9)
WBC # BLD AUTO: 5.8 10E3/UL (ref 4–11)

## 2022-02-14 PROCEDURE — 250N000011 HC RX IP 250 OP 636: Performed by: STUDENT IN AN ORGANIZED HEALTH CARE EDUCATION/TRAINING PROGRAM

## 2022-02-14 PROCEDURE — 76937 US GUIDE VASCULAR ACCESS: CPT

## 2022-02-14 PROCEDURE — 99152 MOD SED SAME PHYS/QHP 5/>YRS: CPT

## 2022-02-14 PROCEDURE — 999N000132 HC STATISTIC PP CARE STAGE 1

## 2022-02-14 PROCEDURE — C1769 GUIDE WIRE: HCPCS

## 2022-02-14 PROCEDURE — 99152 MOD SED SAME PHYS/QHP 5/>YRS: CPT | Mod: GC | Performed by: RADIOLOGY

## 2022-02-14 PROCEDURE — 36592 COLLECT BLOOD FROM PICC: CPT | Performed by: NURSE PRACTITIONER

## 2022-02-14 PROCEDURE — 272N000504 HC NEEDLE CR4

## 2022-02-14 PROCEDURE — 85027 COMPLETE CBC AUTOMATED: CPT | Performed by: NURSE PRACTITIONER

## 2022-02-14 PROCEDURE — 999N000142 HC STATISTIC PROCEDURE PREP ONLY

## 2022-02-14 PROCEDURE — G0463 HOSPITAL OUTPT CLINIC VISIT: HCPCS | Mod: 25

## 2022-02-14 PROCEDURE — 93985 DUP-SCAN HEMO COMPL BI STD: CPT | Mod: 26 | Performed by: RADIOLOGY

## 2022-02-14 PROCEDURE — 85610 PROTHROMBIN TIME: CPT | Performed by: NURSE PRACTITIONER

## 2022-02-14 PROCEDURE — 99153 MOD SED SAME PHYS/QHP EA: CPT

## 2022-02-14 RX ORDER — NALOXONE HYDROCHLORIDE 0.4 MG/ML
0.4 INJECTION, SOLUTION INTRAMUSCULAR; INTRAVENOUS; SUBCUTANEOUS
Status: DISCONTINUED | OUTPATIENT
Start: 2022-02-14 | End: 2022-02-14 | Stop reason: HOSPADM

## 2022-02-14 RX ORDER — NALOXONE HYDROCHLORIDE 0.4 MG/ML
0.2 INJECTION, SOLUTION INTRAMUSCULAR; INTRAVENOUS; SUBCUTANEOUS
Status: DISCONTINUED | OUTPATIENT
Start: 2022-02-14 | End: 2022-02-14 | Stop reason: HOSPADM

## 2022-02-14 RX ORDER — FENTANYL CITRATE 50 UG/ML
25-50 INJECTION, SOLUTION INTRAMUSCULAR; INTRAVENOUS EVERY 5 MIN PRN
Status: DISCONTINUED | OUTPATIENT
Start: 2022-02-14 | End: 2022-02-14 | Stop reason: HOSPADM

## 2022-02-14 RX ORDER — FLUMAZENIL 0.1 MG/ML
0.2 INJECTION, SOLUTION INTRAVENOUS
Status: DISCONTINUED | OUTPATIENT
Start: 2022-02-14 | End: 2022-02-14 | Stop reason: HOSPADM

## 2022-02-14 RX ORDER — LIDOCAINE 40 MG/G
CREAM TOPICAL
Status: DISCONTINUED | OUTPATIENT
Start: 2022-02-14 | End: 2022-02-14 | Stop reason: HOSPADM

## 2022-02-14 RX ADMIN — MIDAZOLAM 1 MG: 1 INJECTION INTRAMUSCULAR; INTRAVENOUS at 11:38

## 2022-02-14 RX ADMIN — FENTANYL CITRATE 50 MCG: 50 INJECTION, SOLUTION INTRAMUSCULAR; INTRAVENOUS at 11:38

## 2022-02-14 ASSESSMENT — MIFFLIN-ST. JEOR: SCORE: 1341.24

## 2022-02-14 NOTE — PROGRESS NOTES
Returned from IR s/p failed fistula creation.  Sedation received with vascular access.  Reviewed discharge instructions with Johnny and  via iPad.  PIV removed.  Will discharge to home when his ride arrives.

## 2022-02-14 NOTE — DISCHARGE INSTRUCTIONS
[facility]  Going Home after Sedation      For 24 hours:    An adult should stay with you.    Relax and take it easy.    DO NOT make any important legal decisions.    DO NOT drive or operate machines at home or at work.    Resume your regular diet and drink plenty of fluids.    CALL THE PHYSICIAN IF:    You develop nausea or vomiting    You develop hives or a rash or any unexplained itching    ADDITIONAL INSTRUCTIONS:    [facility] INTERVENTIONAL RADIOLOGY DEPARTMENT        Procedure Physician:   Dr. Iqbal                              Date of procedure:February 14, 2022        Telephone numbers:     [phone]      Monday-Friday 8:00 am to 4:30 pm                                              [phone]       After 4:30 pm Monday-Friday, Weekends  & Holidays. Ask for the Interventional Radiologist on call. Someone is  available 24 hours/day        [facility] toll free number: [phone]  Monday-Friday 8:00 am to 4:30 pm

## 2022-02-14 NOTE — PRE-PROCEDURE
GENERAL PRE-PROCEDURE:   Procedure:  Right arm dialysis fistula creation  Date/Time:  2/14/2022 10:02 AM    Written consent obtained?: Yes    Risks and benefits: Risks, benefits and alternatives were discussed    Consent given by:  Patient  Patient states understanding of procedure being performed: Yes    Patient's understanding of procedure matches consent: Yes    Procedure consent matches procedure scheduled: Yes    Expected level of sedation:  Moderate  Appropriately NPO:  Yes  ASA Class:  3  Mallampati  :  Grade 2- soft palate, base of uvula, tonsillar pillars, and portion of posterior pharyngeal wall visible  Lungs:  Lungs clear with good breath sounds bilaterally  Heart:  Normal heart sounds and rate  History & Physical reviewed:  History and physical reviewed and no updates needed  Statement of review:  I have reviewed the lab findings, diagnostic data, medications, and the plan for sedation

## 2022-02-14 NOTE — PROGRESS NOTES
Arrived from home for a dialysis fistula creation.  VSS.  Denies pain.  PIV placed.  Labs sent.  H&P current.  Consent obtained.  Will be ready for procedure when labs are resulted.  Johnny is Guyanese speaking.  Using Ipad .

## 2022-02-14 NOTE — IR NOTE
Patient Name: Johnny Bingham  Medical Record Number: [id number]  Today's Date: 2/14/2022    Procedure: IR Endo AV Fistula Creation   Proceduralist: Faisal Iqbal MD; Shimon Epstein MD (On-Call Pager #[phone])    Patient in room: 1116  Procedure start: 1136  Sedation start: 1138  Procedure end: 1233 (aborted)    Sedation medications administered: 1 mg midazolam, 50 mcg fentanyl, given at 1138  Other medications: None    Report given to: Anali DOSS on 2A at 1235  : A Nigerien  was used for  this visit.     Other Notes: Pt arrived to IR room 5 from 2A. Patient arrives alert  and oriented. Consent reviewed. Patient denies any questions or concerns regarding procedure. Pt positioned Supine and monitored per protocol. Pt tolerated procedure well, without noted complications. Pt transferred back to 2A.    No access attempted.

## 2022-02-14 NOTE — PROCEDURES
[facility]    Procedure: Bilateral UE venous ultrasound and PIV plaement    Date/Time: 2/14/2022 12:35 PM  Performed by: Shimon Epstein  Authorized by: Shimon Epstein Fellow Physician: Tete  Other(s) attending procedure: Iqbal - staff      UNIVERSAL PROTOCOL   Site Marked: Yes  Prior Images Obtained and Reviewed:  Yes  Required items: Required blood products, implants, devices and special equipment available    Patient identity confirmed:  Verbally with patient  Patient was reevaluated immediately before administering moderate or deep sedation or anesthesia  Confirmation Checklist:  Patient's identity using two indicators  Time out: Immediately prior to the procedure a time out was called    Universal Protocol: the Joint Commission Universal Protocol was followed    Preparation: Patient was prepped and draped in usual sterile fashion    ESBL (mL):  0.2     ANESTHESIA    Anesthesia: Local infiltration      SEDATION  Patient Sedated: Yes    Sedation Type:  Moderate (conscious) sedation  Sedation:  Fentanyl and midazolam  Vital signs: Vital signs monitored during sedation    See dictated procedure note for full details.  Findings: RIGHT UE venous US showed median cubital venous  which was too small for pAVF creation. Small cephalic vein occlusive thrombus.     LEFT UE venous US showed  vein which was too small for pAVF creation.     Specimens: none    Complications: None    Condition: Stable    Plan: Will discuss with Dr. Mandel regarding surgical AVF creation.       PROCEDURE  Describe Procedure: RIGHT UE venous US showed median cubital venous  which was too small for pAVF creation. Small cephalic vein occlusive thrombus.     LEFT UE venous US showed  vein which was too small for pAVF creation.     Single left brachial vein PIV started successfully.   Patient Tolerance:  Patient tolerated the procedure well with no immediate  complications  Length of time physician/provider present for 1:1 monitoring during sedation: 75

## 2022-02-15 ENCOUNTER — CARE COORDINATION (OUTPATIENT)
Dept: NEPHROLOGY | Facility: CLINIC | Age: 47
End: 2022-02-15
Payer: COMMERCIAL

## 2022-02-23 ENCOUNTER — TELEPHONE (OUTPATIENT)
Dept: VASCULAR SURGERY | Facility: CLINIC | Age: 47
End: 2022-02-23

## 2022-02-24 ENCOUNTER — APPOINTMENT (OUTPATIENT)
Dept: INTERPRETER SERVICES | Facility: CLINIC | Age: 47
End: 2022-02-24
Payer: COMMERCIAL

## 2022-02-24 NOTE — TELEPHONE ENCOUNTER
The pt is needing a consult with .  I called the phone number listed for the pt and the message states that the wireless customer is not available. I was unable to leave a voicemail.  I Left a message for interpret services to call my phone number I was unable to connect with  services.  Ryan Salazar on 2/23/2022 at 7:03 PM       I was unable to contact  services.   Ryan Salazar on 2/19/2022 at 12:03 PM    I Left a message for interpret service to call my phone number I was unable to connect with  services 2/23 RC

## 2022-02-25 NOTE — TELEPHONE ENCOUNTER
DIAGNOSIS: Consult to discuss a graft   DATE RECEIVED: 3.1.22   NOTES STATUS DETAILS   OFFICE NOTE from referring provider Internal 2.14.22, 2.3.22  Dr. Iqbal  [facility] Vascular

## 2022-03-01 ENCOUNTER — APPOINTMENT (OUTPATIENT)
Dept: INTERPRETER SERVICES | Facility: CLINIC | Age: 47
End: 2022-03-01
Payer: COMMERCIAL

## 2022-03-01 ENCOUNTER — TELEPHONE (OUTPATIENT)
Dept: VASCULAR SURGERY | Facility: CLINIC | Age: 47
End: 2022-03-01

## 2022-03-01 ENCOUNTER — PRE VISIT (OUTPATIENT)
Dept: VASCULAR SURGERY | Facility: CLINIC | Age: 47
End: 2022-03-01

## 2022-03-01 NOTE — TELEPHONE ENCOUNTER
I contacted pt via  services about his missed appointment today. I let him know we would like to see him to discuss dialysis access placement. I was able to reschedule him to see Dr. Mandel next week at 9AM. He is in agreement with the date and time of this appt. Vein mapping already completed.    MICHELLE AlatorreN, RN  [facility] V[facility] Surgery  Ph: [phone]  Fax: [phone]

## 2022-03-08 ENCOUNTER — OFFICE VISIT (OUTPATIENT)
Dept: VASCULAR SURGERY | Facility: CLINIC | Age: 47
End: 2022-03-08
Payer: COMMERCIAL

## 2022-03-08 VITALS — HEART RATE: 75 BPM | SYSTOLIC BLOOD PRESSURE: 127 MMHG | DIASTOLIC BLOOD PRESSURE: 90 MMHG | OXYGEN SATURATION: 99 %

## 2022-03-08 DIAGNOSIS — N18.6 ESRD ON HEMODIALYSIS (H): Primary | ICD-10-CM

## 2022-03-08 DIAGNOSIS — Z99.2 ESRD ON HEMODIALYSIS (H): Primary | ICD-10-CM

## 2022-03-08 PROCEDURE — 99205 OFFICE O/P NEW HI 60 MIN: CPT | Performed by: SURGERY

## 2022-03-08 RX ORDER — CEFAZOLIN SODIUM 2 G/50ML
2 SOLUTION INTRAVENOUS
Status: CANCELLED | OUTPATIENT
Start: 2022-03-08

## 2022-03-08 RX ORDER — CEFAZOLIN SODIUM 2 G/50ML
2 SOLUTION INTRAVENOUS SEE ADMIN INSTRUCTIONS
Status: CANCELLED | OUTPATIENT
Start: 2022-03-08

## 2022-03-08 RX ORDER — ACETAMINOPHEN 325 MG/1
975 TABLET ORAL ONCE
Status: CANCELLED | OUTPATIENT
Start: 2022-03-08 | End: 2022-03-08

## 2022-03-08 NOTE — PROGRESS NOTES
Assessment & Plan   Problem List Items Addressed This Visit        Urinary    ESRD on hemodialysis (H) - Primary         Mr. Bingham is a 46-year-old Wol[place] speaking patient who was referred for dialysis access.  He has been on dialysis since May 2021.    -After reviewing the vein mapping, he does not have any adequate veins for a fistula therefore we will proceed with a forearm loop graft.  He is quite young as we would like to start in the forearm.  He was not a candidate for ellipsys and was evaluated by IR for that.  He is left-handed so we will plan for a right forearm loop graft.  -Previous history of subdural hematoma.  He was cleared by neurology in December for anticoagulation for the procedure.  -We will need preoperative Covid test.  -Procedure to be done under a block.    Review of the result(s) of each unique test - Vein mapping  Independent interpretation of a test performed by another physician/other qualified health care professional (not separately reported) - Veins are too small in both upper extremities for venous fistula.  Right brachial veins of the antecubital fossa looked quite small on the vein mapping.  I examined the myself in clinic under ultrasound and found a nearly 3 mm brachial veins I think this will be adequate.  60 minutes spent on the date of the encounter doing chart review, history and exam, documentation and further activities per the note    Alma Rosa Mandel MD    [facility] HARRY Turner is a 46 year old who presents for the following health issues    HPI   I spoke with Johnny via a .  We discussed the dialysis access options of a vein fistula versus an AV graft.  I explained that he has no veins that are adequate size to be used for a fistula.  He is left-handed so we will start on the right side.  I explained the concept of trying to start in the forearm.  He understands that this may not have adequate flow to be used for  dialysis but with his age we should attempt in the forearm first.  He was previously evaluated by interventional radiology for a percutaneous fistula but was found not to be candidate.    He is reportedly in the process of being evaluated for kidney transplant but has not been approved at this point.  He is currently doing dialysis through a tunneled line.  He receives dialysis on Monday Wednesday and Friday.  I explained to her that I will try to get his procedure on a Thursday but we may have to do it on a Friday and adjust his dialysis schedule.  He understood this and had no concerns.    We discussed the risk of inadequate flow through the graft, for patency of grafts, slightly increased risk of infection compared to a venous dialysis access.  We also discussed the risk of bleeding and bruising after the procedure.  We discussed the rare complications of steal as well as ischemic monomelic neuropathy.  He understood all of these and has agreed to proceed with surgery.    He does have a history of a subdural hematoma in June 2021 status post a craniotomy.  He was seen by neurology in December and cleared for anticoagulation for any form of her procedure.    Review of Systems   Constitutional, HEENT, cardiovascular, pulmonary, gi and gu systems are negative, except as otherwise noted.    Past Medical History:   Diagnosis Date     Cerebral infarction (H) 06/2021     CKD (chronic kidney disease) stage 5, GFR less than 15 ml/min (H)      Hypertension      Past Surgical History:   Procedure Laterality Date     IR CVC TUNNEL PLACEMENT > 5 YRS OF AGE  5/10/2021     IR CVC TUNNEL REVISION RIGHT  10/6/2021     IR FOLLOW UP VISIT OUTPATIENT  2/14/2022     NO HISTORY OF SURGERY       Family History   Problem Relation Age of Onset     Heart Disease Mother      Kidney Disease Father      Heart Disease Father      Diabetes Brother      Colon Cancer No family hx of      Prostate Cancer No family hx of      Social History      Socioeconomic History     Marital status: Single     Spouse name: Not on file     Number of children: Not on file     Years of education: Not on file     Highest education level: Not on file   Occupational History     Not on file   Tobacco Use     Smoking status: Current Every Day Smoker     Packs/day: 0.50     Types: Cigarettes     Smokeless tobacco: Never Used   Vaping Use     Vaping Use: Never used   Substance and Sexual Activity     Alcohol use: Not Currently     Drug use: Never     Sexual activity: Not Currently   Other Topics Concern     Parent/sibling w/ CABG, MI or angioplasty before 65F 55M? Not Asked   Social History Narrative     Not on file     Social Determinants of Health     Financial Resource Strain: Not on file   Food Insecurity: Not on file   Transportation Needs: Not on file   Physical Activity: Not on file   Stress: Not on file   Social Connections: Not on file   Intimate Partner Violence: Not on file   Housing Stability: Not on file     Current Outpatient Medications   Medication     carvedilol (COREG) 12.5 MG tablet     lisinopril (ZESTRIL) 20 MG tablet     acetaminophen (TYLENOL) 500 MG tablet     levETIRAcetam (KEPPRA) 1000 MG tablet     No current facility-administered medications for this visit.      No Known Allergies        Objective    BP (!) 127/90 (BP Location: Right arm, Patient Position: Sitting, Cuff Size: Adult Regular)   Pulse 75   SpO2 99%   There is no height or weight on file to calculate BMI.  Physical Exam   GENERAL: healthy, alert and no distress  NECK: Right-sided tunneled line in place  RESP: lungs clear to auscultation - nowheezes  CV: regular rate and rhythm, no murmur, click or rub, no peripheral edema, strongly palpable radial pulse and weakly palpable ulnar pulse on the right  ABDOMEN: soft, nontender, nondistended  MS: no gross musculoskeletal defects noted, no edema  SKIN: no suspicious lesions or rashes  NEURO: Normal strength and tone, mentation intact and  speech normal  PSYCH: affect normal/bright    DIALYSIS MAPPING ULTRASOUND 2/1/2022 6:35 PM     CLINICAL HISTORY: Planning for future dialysis access.     COMPARISONS: [facility] protocol ultrasound upper extremity dialysis  mapping 6/22/2021.     REFERRING PROVIDER: RAYSHAWN PLUMMER     TECHNIQUE: Bilateral internal jugular, innominate, subclavian, and  axillary veins evaluated with grayscale, color Doppler, and Doppler  waveform ultrasound.     Bilateral cephalic, basilic, brachial, radial, and ulnar veins were  evaluated with grayscale imaging and compression.      vein to the antecubital fossa were evaluated with grayscale  imaging.     Right innominate and bilateral subclavian, brachial, radial, and ulnar  arteries were evaluated with grayscale, color Doppler, and Doppler  waveform ultrasound.     FINDINGS:  RIGHT:       Internal jugular vein: 24 cm/s, phasic, fully compressible       Innominate vein: 23 cm/s, phasic       Subclavian vein, central: 7 cm/s, phasic (previously 31 cm/s)       Subclavian vein, mid: 6 cm/s, phasic, fully compressible  (previously 32 cm/s)       Subclavian vein, lateral: 6 cm/s, phasic, fully compressible  (previously 21 cm/s)       Axillary vein: 10 cm/s, phasic, fully compressible (previously 32  cm/s)           vein, antecubital fossa: 1.8 mm, patent, 1.4 mm  distance from the radial artery          Radial artery, proximal forearm with tourniquet: 2.9 mm          Cephalic vein: Fully compressible.            Proximal arm: 1.6 mm, 0.3 cm deep to skin            Mid arm: 1.7 mm, 0.3 cm deep to skin            Distal arm: 0.8 mm, 0.3 cm deep to skin            Antecubital fossa: 0.6 mm, 0.4 cm deep to skin            Proximal forearm: 1.3 mm, 0.2 cm deep to skin            Mid forearm: 1.4 mm, 0.1 cm deep to skin            Wrist: 1.4 mm, 0.1 cm deep to skin          Basilic vein: Fully compressible             Proximal arm: 6.1 mm, 0.1 cm deep to skin              Mid arm: 3.1 mm, 0.3 cm deep to skin             Distal arm: 3.0 mm, 0.3 cm deep to skin              Antecubital fossa: 2.2 mm, 0.2 cm deep to skin          Brachial veins (lateral / medial): Fully compressible             Proximal arm: 3.1 mm / 3.5 mm             Mid arm: 4.4 mm / 3.5 mm             Distal arm: 2.8 mm / 1.4 mm             Antecubital fossa: 1.8 mm / 2.0 mm          Innominate artery: 44/7 cm/s, triphasic       Subclavian artery: 46/9 cm/s, triphasic       Brachial artery, antecubital fossa: 37/0 cm/s, triphasic, 4.2 mm       Radial artery, wrist: 38/1 cm/s, triphasic, 2.4 mm       Ulnar artery, wrist: 36/0 cm/s, triphasic     LEFT:       Internal jugular vein: 118 cm/s, phasic, fully compressible       Innominate vein: 58 cm/s, phasic       Subclavian vein, central: 42 cm/s, phasic       Subclavian vein, mid: 33 cm/s, phasic, fully compressible       Subclavian vein, lateral: 30 cm/s, phasic, fully compressible       Axillary vein: 36 cm/s, phasic, fully compressible           vein, antecubital fossa: 1.9 mm, patent, 1.8 mm  distance from the radial artery          Radial artery, proximal forearm with tourniquet: 2.4 mm          Cephalic vein: Fully compressible.            Proximal arm: 1.8 mm, 0.2 cm deep to skin            Mid arm: 1.4 mm, 0.2 cm deep to skin            Distal arm: 0.8 mm, 0.2 cm deep to skin            Antecubital fossa: 1.1 mm, 0.2 cm deep to skin            Proximal forearm: 0.9 mm, 0.2 cm deep to skin            Mid forearm: 0.8 mm, 0.3 cm deep to skin            Wrist: 1.4 mm, 0.2 cm deep to skin          Basilic vein: Fully compressible                          Mid arm: 2.6 mm, 0.2 cm deep to skin             Distal arm: 1.9 mm, 0.2 cm deep to skin              Antecubital fossa: 1.7 mm, 0.2 cm deep to skin          Brachial veins (lateral / medial): Fully compressible             Proximal arm: 4.4 mm / 2.9 mm             Mid arm: 3.7 mm / 2.3 mm              Distal arm: 3.4 mm / 1.7 mm             Antecubital fossa: 2.8 mm / 1.1 mm          Subclavian artery: 58/0 cm/s, triphasic       Brachial artery, antecubital fossa: 29/0 cm/s, triphasic, 2.1 mm       Radial artery, wrist: 35/1 cm/s, triphasic, 1.0 mm       Ulnar artery, wrist: 44/0 cm/s, triphasic                                                                      IMPRESSION:  1. DOES NOT meet ELLIPSYS Criteria.      2. Decreased velocities in the right subclavian vein, etiology not  demonstrated. No stenosis suggested in color Doppler images.     3. Small sub mm cephalic veins segments.     4. Patent bilateral arm arteries and veins with measurements as in the  report.

## 2022-03-08 NOTE — LETTER
3/8/2022       RE: Johnny Bingham  [address]  [facility] [address]     Dear Colleague,    Thank you for referring your patient, Johnny Bingham, to the [facility] [place] at [facility]. Please see a copy of my visit note below.      Assessment & Plan   Problem List Items Addressed This Visit        Urinary    ESRD on hemodialysis (H) - Primary         Mr. Bingham is a 46-year-old Danish speaking patient who was referred for dialysis access.  He has been on dialysis since May 2021.    -After reviewing the vein mapping, he does not have any adequate veins for a fistula therefore we will proceed with a forearm loop graft.  He is quite young as we would like to start in the forearm.  He was not a candidate for ellipsys and was evaluated by IR for that.  He is left-handed so we will plan for a right forearm loop graft.  -Previous history of subdural hematoma.  He was cleared by neurology in December for anticoagulation for the procedure.  -We will need preoperative Covid test.  -Procedure to be done under a block.    Review of the result(s) of each unique test - Vein mapping  Independent interpretation of a test performed by another physician/other qualified health care professional (not separately reported) - Veins are too small in both upper extremities for venous fistula.  Right brachial veins of the antecubital fossa looked quite small on the vein mapping.  I examined the myself in clinic under ultrasound and found a nearly 3 mm brachial veins I think this will be adequate.  60 minutes spent on the date of the encounter doing chart review, history and exam, documentation and further activities per the note    Alma Rosa Mandel MD    [facility] [place]    Veronica Turner is a 46 year old who presents for the following health issues    HPI   I spoke with Johnny via a .  We discussed the dialysis access options of a  vein fistula versus an AV graft.  I explained that he has no veins that are adequate size to be used for a fistula.  He is left-handed so we will start on the right side.  I explained the concept of trying to start in the forearm.  He understands that this may not have adequate flow to be used for dialysis but with his age we should attempt in the forearm first.  He was previously evaluated by interventional radiology for a percutaneous fistula but was found not to be candidate.    He is reportedly in the process of being evaluated for kidney transplant but has not been approved at this point.  He is currently doing dialysis through a tunneled line.  He receives dialysis on Monday Wednesday and Friday.  I explained to her that I will try to get his procedure on a Thursday but we may have to do it on a Friday and adjust his dialysis schedule.  He understood this and had no concerns.    We discussed the risk of inadequate flow through the graft, for patency of grafts, slightly increased risk of infection compared to a venous dialysis access.  We also discussed the risk of bleeding and bruising after the procedure.  We discussed the rare complications of steal as well as ischemic monomelic neuropathy.  He understood all of these and has agreed to proceed with surgery.    He does have a history of a subdural hematoma in June 2021 status post a craniotomy.  He was seen by neurology in December and cleared for anticoagulation for any form of her procedure.    Review of Systems   Constitutional, HEENT, cardiovascular, pulmonary, gi and gu systems are negative, except as otherwise noted.    Past Medical History:   Diagnosis Date     Cerebral infarction (H) 06/2021     CKD (chronic kidney disease) stage 5, GFR less than 15 ml/min (H)      Hypertension      Past Surgical History:   Procedure Laterality Date     IR CVC TUNNEL PLACEMENT > 5 YRS OF AGE  5/10/2021     IR CVC TUNNEL REVISION RIGHT  10/6/2021     IR FOLLOW UP VISIT  OUTPATIENT  2/14/2022     NO HISTORY OF SURGERY       Family History   Problem Relation Age of Onset     Heart Disease Mother      Kidney Disease Father      Heart Disease Father      Diabetes Brother      Colon Cancer No family hx of      Prostate Cancer No family hx of      Social History     Socioeconomic History     Marital status: Single     Spouse name: Not on file     Number of children: Not on file     Years of education: Not on file     Highest education level: Not on file   Occupational History     Not on file   Tobacco Use     Smoking status: Current Every Day Smoker     Packs/day: 0.50     Types: Cigarettes     Smokeless tobacco: Never Used   Vaping Use     Vaping Use: Never used   Substance and Sexual Activity     Alcohol use: Not Currently     Drug use: Never     Sexual activity: Not Currently   Other Topics Concern     Parent/sibling w/ CABG, MI or angioplasty before 65F 55M? Not Asked   Social History Narrative     Not on file     Social Determinants of Health     Financial Resource Strain: Not on file   Food Insecurity: Not on file   Transportation Needs: Not on file   Physical Activity: Not on file   Stress: Not on file   Social Connections: Not on file   Intimate Partner Violence: Not on file   Housing Stability: Not on file     Current Outpatient Medications   Medication     carvedilol (COREG) 12.5 MG tablet     lisinopril (ZESTRIL) 20 MG tablet     acetaminophen (TYLENOL) 500 MG tablet     levETIRAcetam (KEPPRA) 1000 MG tablet     No current facility-administered medications for this visit.      No Known Allergies        Objective    BP (!) 127/90 (BP Location: Right arm, Patient Position: Sitting, Cuff Size: Adult Regular)   Pulse 75   SpO2 99%   There is no height or weight on file to calculate BMI.  Physical Exam   GENERAL: healthy, alert and no distress  NECK: Right-sided tunneled line in place  RESP: lungs clear to auscultation - nowheezes  CV: regular rate and rhythm, no murmur, click or  rub, no peripheral edema, strongly palpable radial pulse and weakly palpable ulnar pulse on the right  ABDOMEN: soft, nontender, nondistended  MS: no gross musculoskeletal defects noted, no edema  SKIN: no suspicious lesions or rashes  NEURO: Normal strength and tone, mentation intact and speech normal  PSYCH: affect normal/bright    DIALYSIS MAPPING ULTRASOUND 2/1/2022 6:35 PM     CLINICAL HISTORY: Planning for future dialysis access.     COMPARISONS: [facility] protocol ultrasound upper extremity dialysis  mapping 6/22/2021.     REFERRING PROVIDER: RAYSHAWN PLUMMER     TECHNIQUE: Bilateral internal jugular, innominate, subclavian, and  axillary veins evaluated with grayscale, color Doppler, and Doppler  waveform ultrasound.     Bilateral cephalic, basilic, brachial, radial, and ulnar veins were  evaluated with grayscale imaging and compression.      vein to the antecubital fossa were evaluated with grayscale  imaging.     Right innominate and bilateral subclavian, brachial, radial, and ulnar  arteries were evaluated with grayscale, color Doppler, and Doppler  waveform ultrasound.     FINDINGS:  RIGHT:       Internal jugular vein: 24 cm/s, phasic, fully compressible       Innominate vein: 23 cm/s, phasic       Subclavian vein, central: 7 cm/s, phasic (previously 31 cm/s)       Subclavian vein, mid: 6 cm/s, phasic, fully compressible  (previously 32 cm/s)       Subclavian vein, lateral: 6 cm/s, phasic, fully compressible  (previously 21 cm/s)       Axillary vein: 10 cm/s, phasic, fully compressible (previously 32  cm/s)           vein, antecubital fossa: 1.8 mm, patent, 1.4 mm  distance from the radial artery          Radial artery, proximal forearm with tourniquet: 2.9 mm          Cephalic vein: Fully compressible.            Proximal arm: 1.6 mm, 0.3 cm deep to skin            Mid arm: 1.7 mm, 0.3 cm deep to skin            Distal arm: 0.8 mm, 0.3 cm deep to skin            Antecubital fossa:  0.6 mm, 0.4 cm deep to skin            Proximal forearm: 1.3 mm, 0.2 cm deep to skin            Mid forearm: 1.4 mm, 0.1 cm deep to skin            Wrist: 1.4 mm, 0.1 cm deep to skin          Basilic vein: Fully compressible             Proximal arm: 6.1 mm, 0.1 cm deep to skin             Mid arm: 3.1 mm, 0.3 cm deep to skin             Distal arm: 3.0 mm, 0.3 cm deep to skin              Antecubital fossa: 2.2 mm, 0.2 cm deep to skin          Brachial veins (lateral / medial): Fully compressible             Proximal arm: 3.1 mm / 3.5 mm             Mid arm: 4.4 mm / 3.5 mm             Distal arm: 2.8 mm / 1.4 mm             Antecubital fossa: 1.8 mm / 2.0 mm          Innominate artery: 44/7 cm/s, triphasic       Subclavian artery: 46/9 cm/s, triphasic       Brachial artery, antecubital fossa: 37/0 cm/s, triphasic, 4.2 mm       Radial artery, wrist: 38/1 cm/s, triphasic, 2.4 mm       Ulnar artery, wrist: 36/0 cm/s, triphasic     LEFT:       Internal jugular vein: 118 cm/s, phasic, fully compressible       Innominate vein: 58 cm/s, phasic       Subclavian vein, central: 42 cm/s, phasic       Subclavian vein, mid: 33 cm/s, phasic, fully compressible       Subclavian vein, lateral: 30 cm/s, phasic, fully compressible       Axillary vein: 36 cm/s, phasic, fully compressible           vein, antecubital fossa: 1.9 mm, patent, 1.8 mm  distance from the radial artery          Radial artery, proximal forearm with tourniquet: 2.4 mm          Cephalic vein: Fully compressible.            Proximal arm: 1.8 mm, 0.2 cm deep to skin            Mid arm: 1.4 mm, 0.2 cm deep to skin            Distal arm: 0.8 mm, 0.2 cm deep to skin            Antecubital fossa: 1.1 mm, 0.2 cm deep to skin            Proximal forearm: 0.9 mm, 0.2 cm deep to skin            Mid forearm: 0.8 mm, 0.3 cm deep to skin            Wrist: 1.4 mm, 0.2 cm deep to skin          Basilic vein: Fully compressible                          Mid arm: 2.6  mm, 0.2 cm deep to skin             Distal arm: 1.9 mm, 0.2 cm deep to skin              Antecubital fossa: 1.7 mm, 0.2 cm deep to skin          Brachial veins (lateral / medial): Fully compressible             Proximal arm: 4.4 mm / 2.9 mm             Mid arm: 3.7 mm / 2.3 mm             Distal arm: 3.4 mm / 1.7 mm             Antecubital fossa: 2.8 mm / 1.1 mm          Subclavian artery: 58/0 cm/s, triphasic       Brachial artery, antecubital fossa: 29/0 cm/s, triphasic, 2.1 mm       Radial artery, wrist: 35/1 cm/s, triphasic, 1.0 mm       Ulnar artery, wrist: 44/0 cm/s, triphasic                                                                      IMPRESSION:  1. DOES NOT meet ELLIPSYS Criteria.      2. Decreased velocities in the right subclavian vein, etiology not  demonstrated. No stenosis suggested in color Doppler images.     3. Small sub mm cephalic veins segments.     4. Patent bilateral arm arteries and veins with measurements as in the  report.             Alma Rosa Mandel MD

## 2022-03-08 NOTE — NURSING NOTE
Chief Complaint   Patient presents with     Consult     Consult for dialysis graft placement.        Medications and allergies reconciled.  Vitals collected.    Alicia Esparza LPN

## 2022-03-10 DIAGNOSIS — Z11.59 ENCOUNTER FOR SCREENING FOR OTHER VIRAL DISEASES: Primary | ICD-10-CM

## 2022-03-11 ENCOUNTER — TELEPHONE (OUTPATIENT)
Dept: VASCULAR SURGERY | Facility: CLINIC | Age: 47
End: 2022-03-11
Payer: COMMERCIAL

## 2022-03-11 NOTE — TELEPHONE ENCOUNTER
I contacted pt with  to discuss his surgery date of 3/17. Pt states this date and time will work for him. I let him know our surgery scheduling team will get in touch next week with detailed pre-op instructions.    SEGUNDO Alatorre, RN  [facility]  Ph: [phone]  Fax: [phone]

## 2022-03-16 ENCOUNTER — TELEPHONE (OUTPATIENT)
Dept: VASCULAR SURGERY | Facility: CLINIC | Age: 47
End: 2022-03-16
Payer: COMMERCIAL

## 2022-03-16 NOTE — TELEPHONE ENCOUNTER
Called patient via , there was no answer and we were unable to leave a message (The phone just rang)     Called ED contact Father -   Asked father if he can please communicate to to patient that the procedure tomorrow will be cancelled due to his missed COVID test yesterday 03/15    Father had no other questions.   He was given my direct call back to reschedule surgery.     Beverly Best  Sharlene-Op Coordinator  [phone]

## 2022-04-06 ENCOUNTER — TELEPHONE (OUTPATIENT)
Dept: VASCULAR SURGERY | Facility: CLINIC | Age: 47
End: 2022-04-06
Payer: COMMERCIAL

## 2022-04-06 NOTE — TELEPHONE ENCOUNTER
Called Omar to schedule procedure per staff as he needs the surgery with Dr. Mandel.   Patient is only available on Tues and THU. Advised I will add him to a waitlist on 04/21.   Patient was advised we have been unable to get a hold of him at his phone number, he said he is unsure why.   Advised I will try to call him tomorrow, if I am unable to reach him to confirm surgery details I will call him back on Friday while he is on Dialysis.     Patient agreed and had no other questions.     Beverly Best  Sharlene-Op Coordinator  [phone]

## 2022-04-07 ENCOUNTER — APPOINTMENT (OUTPATIENT)
Dept: INTERPRETER SERVICES | Facility: CLINIC | Age: 47
End: 2022-04-07
Payer: COMMERCIAL

## 2022-04-07 ENCOUNTER — TELEPHONE (OUTPATIENT)
Dept: VASCULAR SURGERY | Facility: CLINIC | Age: 47
End: 2022-04-07
Payer: COMMERCIAL

## 2022-04-07 DIAGNOSIS — Z11.59 ENCOUNTER FOR SCREENING FOR OTHER VIRAL DISEASES: Primary | ICD-10-CM

## 2022-04-07 NOTE — TELEPHONE ENCOUNTER
Spoke with patient via  to schedule procedure with Dr. Mandel    Procedure was scheduled on 04/21 at [facility] OR  Patient will have H&P with Clinic, [facility] (PCP)     Patient is aware a COVID-19 test is needed before their procedure. The test should be with-in 4 days of their procedure.   Test Details: Date 4/19 Location [facility] Visit scheduled on:     4-6 week 05/10     Patient is aware a / is needed day of surgery.   Surgery letter was sent via Mail, patient has my direct contact information for any further questions.     Vendor requested: no

## 2022-04-08 DIAGNOSIS — Z98.890 POSTOPERATIVE STATE: Primary | ICD-10-CM

## 2022-04-11 ENCOUNTER — TELEPHONE (OUTPATIENT)
Dept: FAMILY MEDICINE | Facility: CLINIC | Age: 47
End: 2022-04-11
Payer: COMMERCIAL

## 2022-04-11 NOTE — TELEPHONE ENCOUNTER
Call received from patient to schedule appointment for pre-op appointment due to upcoming procedure for fistula placement. Procedure scheduled for 4/21/22.    Uzbek  assisted with this phone call.    Pt scheduled on Thursday with Dr. Ndiaye for pre-op visit at 2:00pm.     Pt verbalized understanding and had no further questions at this time.     Adrianna Beyer RN, BSN  [facility]

## 2022-04-14 ENCOUNTER — OFFICE VISIT (OUTPATIENT)
Dept: FAMILY MEDICINE | Facility: CLINIC | Age: 47
End: 2022-04-14
Payer: COMMERCIAL

## 2022-04-14 ENCOUNTER — TELEPHONE (OUTPATIENT)
Dept: TRANSPLANT | Facility: CLINIC | Age: 47
End: 2022-04-14
Payer: COMMERCIAL

## 2022-04-14 VITALS
RESPIRATION RATE: 18 BRPM | SYSTOLIC BLOOD PRESSURE: 128 MMHG | OXYGEN SATURATION: 99 % | BODY MASS INDEX: 19.14 KG/M2 | WEIGHT: 119.1 LBS | DIASTOLIC BLOOD PRESSURE: 90 MMHG | HEART RATE: 71 BPM | HEIGHT: 66 IN | TEMPERATURE: 98.6 F

## 2022-04-14 DIAGNOSIS — N18.6 ESRD ON HEMODIALYSIS (H): ICD-10-CM

## 2022-04-14 DIAGNOSIS — Z01.818 PREOP GENERAL PHYSICAL EXAM: Primary | ICD-10-CM

## 2022-04-14 DIAGNOSIS — N18.9 HISTORY OF ANEMIA DUE TO CKD: ICD-10-CM

## 2022-04-14 DIAGNOSIS — Z86.2 HISTORY OF ANEMIA DUE TO CKD: ICD-10-CM

## 2022-04-14 DIAGNOSIS — I12.9 RENAL HYPERTENSION: ICD-10-CM

## 2022-04-14 DIAGNOSIS — Z99.2 ESRD ON HEMODIALYSIS (H): ICD-10-CM

## 2022-04-14 DIAGNOSIS — F17.200 TOBACCO USE DISORDER: ICD-10-CM

## 2022-04-14 DIAGNOSIS — N25.81 SECONDARY RENAL HYPERPARATHYROIDISM (H): ICD-10-CM

## 2022-04-14 DIAGNOSIS — Z86.79 HISTORY OF SUBDURAL HEMATOMA: ICD-10-CM

## 2022-04-14 PROCEDURE — 99214 OFFICE O/P EST MOD 30 MIN: CPT | Performed by: INTERNAL MEDICINE

## 2022-04-14 RX ORDER — CARVEDILOL 12.5 MG/1
12.5 TABLET ORAL 2 TIMES DAILY WITH MEALS
Qty: 180 TABLET | Refills: 3 | Status: CANCELLED | OUTPATIENT
Start: 2022-04-14

## 2022-04-14 ASSESSMENT — PAIN SCALES - GENERAL: PAINLEVEL: NO PAIN (0)

## 2022-04-14 NOTE — TELEPHONE ENCOUNTER
Spoke with patient wi assistance of Belarusian .Confirmed upcoming P[facility] appointments at [facility] on 4/21/22 starting at 0730. Instructed patient may eat breakfast, take regularly scheduled medications and be accompanied by 1 adult visitor.   Patient given contact information for any further questions/concerns/need to reschedule.

## 2022-04-14 NOTE — PROGRESS NOTES
[facility]  [address] [address]  Phone: [phone]  Primary Provider: [facility]  Pre-op Performing Provider:    SHALONDA ROLLE  PHONE,       PREOPERATIVE EVALUATION:  Today's date: 4/14/2022    Johnny Bingham is a 46 year old male who presents for a preoperative evaluation.    Surgical Information:  Surgery/Procedure: CREATION, ARTERIOVENOUS FISTULA, UPPER EXTREMITY- forearm loop graft  Surgery Location: [facility]  Surgeon: Alma Rosa Pina  Surgery Date: 04/21/22  Time of Surgery: 6:30am  Where patient plans to recover: At home with family  Fax number for surgical facility: Note does not need to be faxed, will be available electronically in Epic.    Type of Anesthesia Anticipated: Combined MAC with Supraclavicular Block    Assessment & Plan     1.  Preop general physical exam completed.  Patient is medically optimized to undergo the planned surgical procedure.  2.  End-stage renal disease on hemodialysis.  Patient is to undergo forearm loop graft for vascular access.  3.  History of subdural hematoma bilateral on 6/29/2021 requiring right craniotomy for evacuation.  Neurology has cleared patient for future anticoagulation therapy.  4.  Renal hypertension-borderline controled  with combination of lisinopril 20 mg a day and carvedilol 12.5 mg twice a day.  5.  Secondary renal hyperparathyroidism.  6.  History of anemia due to chronic kidney disease-mild.  7.  Tobacco use disorder with about 14-pack-year history of smoking      The proposed surgical procedure is considered LOW risk.    Possible Sleep Apnea: No       RECOMMENDATION:  APPROVAL GIVEN to proceed with proposed procedure, without further diagnostic evaluation.        Subjective     HPI related to upcoming procedure:     46-year-old with end-stage renal disease on hemodialysis is to undergo forearm loop graft for vascular access.  He has been on  hemodialysis since May 2021.  Indicates he is doing fine.  He had bilateral subdural hematoma June 2021 requiring right-sided craniotomy.  He has done well since.  He smokes half a pack of cigarettes a day.  Denies alcohol abuse.  Denies chest pain or shortness of breath.      Preop Questions 4/14/2022   1. Have you ever had a heart attack or stroke? No   2. Have you ever had surgery on your heart or blood vessels, such as a stent placement, a coronary artery bypass, or surgery on an artery in your head, neck, heart, or legs? No   3. Do you have chest pain with activity? No   4. Do you have a history of  heart failure? No   5. Do you currently have a cold, bronchitis or symptoms of other infection? No   6. Do you have a cough, shortness of breath, or wheezing? No   7. Do you or anyone in your family have previous history of blood clots? No   8. Do you or does anyone in your family have a serious bleeding problem such as prolonged bleeding following surgeries or cuts? No   9. Have you ever had problems with anemia or been told to take iron pills? No   10. Have you had any abnormal blood loss such as black, tarry or bloody stools? No   11. Have you ever had a blood transfusion? UNKNOWN -    12. Are you willing to have a blood transfusion if it is medically needed before, during, or after your surgery? Yes   13. Have you or any of your relatives ever had problems with anesthesia? No   14. Do you have sleep apnea, excessive snoring or daytime drowsiness? YES -    14a. Do you have a CPAP machine? No   15. Do you have any artifical heart valves or other implanted medical devices like a pacemaker, defibrillator, or continuous glucose monitor? No   16. Do you have artificial joints? No   17. Are you allergic to latex? No     Health Care Directive:  Patient does not have a Health Care Directive or Living Will: Discussed advance care planning with patient; however, patient declined at this time.        Review of  Systems  Constitutional, neuro, ENT, endocrine, pulmonary, cardiac, gastrointestinal, genitourinary, musculoskeletal, integument and psychiatric systems are negative, except as otherwise noted.    Patient Active Problem List    Diagnosis Date Noted     Episode of unresponsiveness 09/17/2021     Priority: Medium     Spell of unresponsiveness Jul 2021 approx ten days following evacuation of SDH/SAH x 2. Thought to be due to seizure and started on levetiracetam. EEG shortly afterwards without epileptiform discharges (second hand discontinue summary). Documentation of original impressions not available. CRF DD, levetiracetam 1000 mg per day. Elected to continue Rx x 6 months, repeat EEG and discontinue if appropriate.       ESRD on hemodialysis (H) 09/10/2021     Priority: Medium     History of subdural hematoma 09/10/2021     Priority: Medium     CKD (chronic kidney disease) stage 5, GFR less than 15 ml/min (H) 07/21/2021     Priority: Medium     Renal hypertension 09/16/2020     Priority: Medium     Acute renal failure (ARF) (H) 09/09/2020     Priority: Medium      Past Medical History:   Diagnosis Date     Cerebral infarction (H) 06/2021     CKD (chronic kidney disease) stage 5, GFR less than 15 ml/min (H)      Hypertension      Past Surgical History:   Procedure Laterality Date     IR CVC TUNNEL PLACEMENT > 5 YRS OF AGE  5/10/2021     IR CVC TUNNEL REVISION RIGHT  10/6/2021     IR FOLLOW UP VISIT OUTPATIENT  2/14/2022     NO HISTORY OF SURGERY       Current Outpatient Medications   Medication Sig Dispense Refill     acetaminophen (TYLENOL) 500 MG tablet Take 1,000 mg by mouth (Patient not taking: Reported on 12/30/2021)       carvedilol (COREG) 12.5 MG tablet Take 1 tablet (12.5 mg) by mouth 2 times daily (with meals) 180 tablet 3     levETIRAcetam (KEPPRA) 1000 MG tablet Take 1 tablet (1,000 mg) by mouth At Bedtime (Patient not taking: Reported on 12/30/2021) 31 tablet 11     lisinopril (ZESTRIL) 20 MG tablet Take  1 tablet (20 mg) by mouth daily 90 tablet 3       No Known Allergies     Social History     Tobacco Use     Smoking status: Current Every Day Smoker     Packs/day: 0.50     Types: Cigarettes     Smokeless tobacco: Never Used   Substance Use Topics     Alcohol use: Not Currently     Family History   Problem Relation Age of Onset     Heart Disease Mother      Kidney Disease Father      Heart Disease Father      Diabetes Brother      Colon Cancer No family hx of      Prostate Cancer No family hx of      History   Drug Use Unknown         Objective     There were no vitals taken for this visit.    Physical Exam    GENERAL APPEARANCE: healthy, alert and no distress     EYES: EOMI,  PERRL     HENT: ear canals and TM's normal and nose and mouth without ulcers or lesions     NECK: no adenopathy, no asymmetry, masses, or scars and thyroid normal to palpation     RESP: lungs clear to auscultation - no rales, rhonchi or wheezes     CV: regular rates and rhythm, normal S1 S2, no S3 or S4 and no murmur, click or rub     ABDOMEN:  soft, nontender, no HSM or masses and bowel sounds normal     MS: extremities normal- no gross deformities noted, no evidence of inflammation in joints, FROM in all extremities.     SKIN: no suspicious lesions or rashes     NEURO: Normal strength and tone, sensory exam grossly normal, mentation intact and speech normal     PSYCH: mentation appears normal. and affect normal/bright     LYMPHATICS: No cervical adenopathy    Recent Labs   Lab Test 02/14/22  1021 02/14/22  1020 10/12/21  0959 09/17/21  0849 09/17/21  0848 05/10/21  2112 05/10/21  1102   HGB 12.1*  --  10.8*   < >  --    < >  --      --  251   < >  --    < >  --    INR  --  0.93  --   --   --   --  1.02   NA  --   --  130*  --  136   < >  --    POTASSIUM  --   --  4.5  --  6.1*   < >  --    CR  --   --  9.18*  --  10.90*   < >  --     < > = values in this interval not displayed.        Diagnostics:     No EKG required for low risk  surgery (cataract, skin procedure, breast biopsy, etc).    Revised Cardiac Risk Index (RCRI):  The patient has the following serious cardiovascular risks for perioperative complications:   - No serious cardiac risks = 0 points     RCRI Interpretation: 0 points: Class I (very low risk - 0.4% complication rate)           Signed Electronically by: Billy Ndiaye MD  Copy of this evaluation report is provided to requesting physician.

## 2022-04-14 NOTE — PATIENT INSTRUCTIONS
Preparing for Your Surgery  Getting started  A nurse will call you to review your health history and instructions. They will give you an arrival time based on your scheduled surgery time. Please be ready to share:    Your doctor's clinic name and phone number    Your medical, surgical and anesthesia history    A list of allergies and sensitivities    A list of medicines, including herbal treatments and over-the-counter drugs    Whether the patient has a legal guardian (ask how to send us the papers in advance)  Please tell us if you're pregnant--or if there's any chance you might be pregnant. Some surgeries may injure a fetus (unborn baby), so they require a pregnancy test. Surgeries that are safe for a fetus don't always need a test, and you can choose whether to have one.   If you have a child who's having surgery, please ask for a copy of Preparing for Your Child's Surgery.    Preparing for surgery    Within 30 days of surgery: Have a pre-op exam (sometimes called an H&P, or History and Physical). This can be done at a clinic or pre-operative center.  ? If you're having a , you may not need this exam. Talk to your care team.    At your pre-op exam, talk to your care team about all medicines you take. If you need to stop any medicines before surgery, ask when to start taking them again.  ? We do this for your safety. Many medicines can make you bleed too much during surgery. Some change how well surgery (anesthesia) drugs work.    Call your insurance company to let them know you're having surgery. (If you don't have insurance, call [phone].)    Call your clinic if there's any change in your health. This includes signs of a cold or flu (sore throat, runny nose, cough, rash, fever). It also includes a scrape or scratch near the surgery site.    If you have questions on the day of surgery, call your hospital or surgery center.  COVID testing  You may need to be tested for COVID-19 before having  surgery. If so, your surgical team will give you instructions for scheduling this test, separate from your preoperative history and physical.  Eating and drinking guidelines  For your safety: Unless your surgeon tells you otherwise, follow the guidelines below.    Eat and drink as usual until 8 hours before surgery. After that, no food or milk.    Drink clear liquids until 2 hours before surgery. These are liquids you can see through, like water, Gatorade and Propel Water. You may also have black coffee and tea (no cream or milk).    Nothing by mouth within 2 hours of surgery. This includes gum, candy and breath mints.    If you drink alcohol: Stop drinking it the night before surgery.    If your care team tells you to take medicine on the morning of surgery, it's okay to take it with a sip of water.  Preventing infection    Shower or bathe the night before and morning of your surgery. Follow the instructions your clinic gave you. (If no instructions, use regular soap.)    Don't shave or clip hair near your surgery site. We'll remove the hair if needed.    Don't smoke or vape the morning of surgery. You may chew nicotine gum up to 2 hours before surgery. A nicotine patch is okay.  ? Note: Some surgeries require you to completely quit smoking and nicotine. Check with your surgeon.    Your care team will make every effort to keep you safe from infection. We will:  ? Clean our hands often with soap and water (or an alcohol-based hand rub).  ? Clean the skin at your surgery site with a special soap that kills germs.  ? Give you a special gown to keep you warm. (Cold raises the risk of infection.)  ? Wear special hair covers, masks, gowns and gloves during surgery.  ? Give antibiotic medicine, if prescribed. Not all surgeries need antibiotics.  What to bring on the day of surgery    Photo ID and insurance card    Copy of your health care directive, if you have one    Glasses and hearing aides (bring cases)  ? You can't  wear contacts during surgery    Inhaler and eye drops, if you use them (tell us about these when you arrive)    CPAP machine or breathing device, if you use them    A few personal items, if spending the night    If you have . . .  ? A pacemaker, ICD (cardiac defibrillator) or other implant: Bring the ID card.  ? An implanted stimulator: Bring the remote control.  ? A legal guardian: Bring a copy of the certified (court-stamped) guardianship papers.  Please remove any jewelry, including body piercings. Leave jewelry and other valuables at home.  If you're going home the day of surgery    You must have a responsible adult drive you home. They should stay with you overnight as well.    If you don't have someone to stay with you, and you aren't safe to go home alone, we may keep you overnight. Insurance often won't pay for this.  After surgery  If it's hard to control your pain or you need more pain medicine, please call your surgeon's office.  Questions?   If you have any questions for your care team, list them here: _________________________________________________________________________________________________________________________________________________________________________ ____________________________________ ____________________________________ ____________________________________  For informational purposes only. Not to replace the advice of your health care provider. Copyright   2003, 2019 [facility]. All rights reserved. Clinically reviewed by Gloria Lott MD. [employer]ge [id number] - REV 07/21.

## 2022-04-15 PROBLEM — N18.9 HISTORY OF ANEMIA DUE TO CKD: Status: ACTIVE | Noted: 2022-04-15

## 2022-04-15 PROBLEM — Z86.2 HISTORY OF ANEMIA DUE TO CKD: Status: ACTIVE | Noted: 2022-04-15

## 2022-04-15 PROBLEM — N17.9 ACUTE RENAL FAILURE (ARF) (H): Status: RESOLVED | Noted: 2020-09-09 | Resolved: 2022-04-15

## 2022-04-15 NOTE — TELEPHONE ENCOUNTER
Message received from Dr Dudley that patient has fistula surgery scheduled next Thursday 4/21 and will need to cancel PKE. Called patient with assistance of [place]  and rescheduled to Thursday May 26th

## 2022-04-19 ENCOUNTER — LAB (OUTPATIENT)
Dept: LAB | Facility: CLINIC | Age: 47
End: 2022-04-19
Attending: SURGERY
Payer: COMMERCIAL

## 2022-04-19 DIAGNOSIS — Z11.59 ENCOUNTER FOR SCREENING FOR OTHER VIRAL DISEASES: ICD-10-CM

## 2022-04-19 PROCEDURE — U0005 INFEC AGEN DETEC AMPLI PROBE: HCPCS

## 2022-04-19 PROCEDURE — U0003 INFECTIOUS AGENT DETECTION BY NUCLEIC ACID (DNA OR RNA); SEVERE ACUTE RESPIRATORY SYNDROME CORONAVIRUS 2 (SARS-COV-2) (CORONAVIRUS DISEASE [COVID-19]), AMPLIFIED PROBE TECHNIQUE, MAKING USE OF HIGH THROUGHPUT TECHNOLOGIES AS DESCRIBED BY CMS-2020-01-R: HCPCS

## 2022-04-20 ENCOUNTER — ANESTHESIA EVENT (OUTPATIENT)
Dept: SURGERY | Facility: CLINIC | Age: 47
End: 2022-04-20
Payer: COMMERCIAL

## 2022-04-20 LAB — SARS-COV-2 RNA RESP QL NAA+PROBE: NEGATIVE

## 2022-04-21 ENCOUNTER — APPOINTMENT (OUTPATIENT)
Dept: INTERPRETER SERVICES | Facility: CLINIC | Age: 47
End: 2022-04-21
Attending: SURGERY
Payer: COMMERCIAL

## 2022-04-21 ENCOUNTER — HOSPITAL ENCOUNTER (OUTPATIENT)
Facility: CLINIC | Age: 47
Discharge: HOME | End: 2022-04-21
Attending: SURGERY | Admitting: SURGERY
Payer: COMMERCIAL

## 2022-04-21 ENCOUNTER — ANESTHESIA (OUTPATIENT)
Dept: SURGERY | Facility: CLINIC | Age: 47
End: 2022-04-21
Payer: COMMERCIAL

## 2022-04-21 VITALS
TEMPERATURE: 98.6 F | RESPIRATION RATE: 17 BRPM | SYSTOLIC BLOOD PRESSURE: 123 MMHG | HEART RATE: 72 BPM | HEIGHT: 66 IN | OXYGEN SATURATION: 99 % | BODY MASS INDEX: 17.68 KG/M2 | WEIGHT: 110.01 LBS | DIASTOLIC BLOOD PRESSURE: 96 MMHG

## 2022-04-21 DIAGNOSIS — N18.6 ESRD ON HEMODIALYSIS (H): ICD-10-CM

## 2022-04-21 DIAGNOSIS — Z99.2 ESRD ON HEMODIALYSIS (H): ICD-10-CM

## 2022-04-21 LAB
ABO/RH(D): NORMAL
ANION GAP SERPL CALCULATED.3IONS-SCNC: 9 MMOL/L (ref 3–14)
ANTIBODY SCREEN: NEGATIVE
BUN SERPL-MCNC: 31 MG/DL (ref 7–30)
CALCIUM SERPL-MCNC: 9 MG/DL (ref 8.5–10.1)
CHLORIDE BLD-SCNC: 102 MMOL/L (ref 94–109)
CO2 SERPL-SCNC: 23 MMOL/L (ref 20–32)
CREAT SERPL-MCNC: 7.17 MG/DL (ref 0.66–1.25)
GFR SERPL CREATININE-BSD FRML MDRD: 9 ML/MIN/1.73M2
GLUCOSE BLD-MCNC: 96 MG/DL (ref 70–99)
GLUCOSE BLDC GLUCOMTR-MCNC: 84 MG/DL (ref 70–99)
POTASSIUM BLD-SCNC: 4.2 MMOL/L (ref 3.4–5.3)
SODIUM SERPL-SCNC: 134 MMOL/L (ref 133–144)
SPECIMEN EXPIRATION DATE: NORMAL

## 2022-04-21 PROCEDURE — 86901 BLOOD TYPING SEROLOGIC RH(D): CPT | Performed by: SURGERY

## 2022-04-21 PROCEDURE — 999N000141 HC STATISTIC PRE-PROCEDURE NURSING ASSESSMENT: Performed by: SURGERY

## 2022-04-21 PROCEDURE — 80048 BASIC METABOLIC PNL TOTAL CA: CPT | Performed by: SURGERY

## 2022-04-21 PROCEDURE — 250N000013 HC RX MED GY IP 250 OP 250 PS 637: Performed by: SURGERY

## 2022-04-21 PROCEDURE — 250N000011 HC RX IP 250 OP 636: Performed by: STUDENT IN AN ORGANIZED HEALTH CARE EDUCATION/TRAINING PROGRAM

## 2022-04-21 PROCEDURE — 360N000076 HC SURGERY LEVEL 3, PER MIN: Performed by: SURGERY

## 2022-04-21 PROCEDURE — 250N000009 HC RX 250: Performed by: SURGERY

## 2022-04-21 PROCEDURE — C1757 CATH, THROMBECTOMY/EMBOLECT: HCPCS | Performed by: SURGERY

## 2022-04-21 PROCEDURE — 272N000001 HC OR GENERAL SUPPLY STERILE: Performed by: SURGERY

## 2022-04-21 PROCEDURE — 82962 GLUCOSE BLOOD TEST: CPT

## 2022-04-21 PROCEDURE — 36415 COLL VENOUS BLD VENIPUNCTURE: CPT | Performed by: SURGERY

## 2022-04-21 PROCEDURE — 250N000009 HC RX 250

## 2022-04-21 PROCEDURE — 370N000017 HC ANESTHESIA TECHNICAL FEE, PER MIN: Performed by: SURGERY

## 2022-04-21 PROCEDURE — 250N000011 HC RX IP 250 OP 636

## 2022-04-21 PROCEDURE — 250N000009 HC RX 250: Performed by: STUDENT IN AN ORGANIZED HEALTH CARE EDUCATION/TRAINING PROGRAM

## 2022-04-21 PROCEDURE — 250N000011 HC RX IP 250 OP 636: Performed by: SURGERY

## 2022-04-21 PROCEDURE — 710N000012 HC RECOVERY PHASE 2, PER MINUTE: Performed by: SURGERY

## 2022-04-21 RX ORDER — HYDROMORPHONE HYDROCHLORIDE 1 MG/ML
0.2 INJECTION, SOLUTION INTRAMUSCULAR; INTRAVENOUS; SUBCUTANEOUS EVERY 5 MIN PRN
Status: DISCONTINUED | OUTPATIENT
Start: 2022-04-21 | End: 2022-04-21 | Stop reason: HOSPADM

## 2022-04-21 RX ORDER — FENTANYL CITRATE 50 UG/ML
25 INJECTION, SOLUTION INTRAMUSCULAR; INTRAVENOUS EVERY 5 MIN PRN
Status: DISCONTINUED | OUTPATIENT
Start: 2022-04-21 | End: 2022-04-21 | Stop reason: HOSPADM

## 2022-04-21 RX ORDER — NALOXONE HYDROCHLORIDE 0.4 MG/ML
0.2 INJECTION, SOLUTION INTRAMUSCULAR; INTRAVENOUS; SUBCUTANEOUS
Status: DISCONTINUED | OUTPATIENT
Start: 2022-04-21 | End: 2022-04-21 | Stop reason: HOSPADM

## 2022-04-21 RX ORDER — SODIUM CHLORIDE, SODIUM LACTATE, POTASSIUM CHLORIDE, CALCIUM CHLORIDE 600; 310; 30; 20 MG/100ML; MG/100ML; MG/100ML; MG/100ML
INJECTION, SOLUTION INTRAVENOUS CONTINUOUS
Status: DISCONTINUED | OUTPATIENT
Start: 2022-04-21 | End: 2022-04-21 | Stop reason: HOSPADM

## 2022-04-21 RX ORDER — CEFAZOLIN SODIUM/WATER 2 G/20 ML
2 SYRINGE (ML) INTRAVENOUS
Status: COMPLETED | OUTPATIENT
Start: 2022-04-21 | End: 2022-04-21

## 2022-04-21 RX ORDER — ONDANSETRON 2 MG/ML
INJECTION INTRAMUSCULAR; INTRAVENOUS PRN
Status: DISCONTINUED | OUTPATIENT
Start: 2022-04-21 | End: 2022-04-21

## 2022-04-21 RX ORDER — LIDOCAINE 40 MG/G
CREAM TOPICAL
Status: DISCONTINUED | OUTPATIENT
Start: 2022-04-21 | End: 2022-04-21 | Stop reason: HOSPADM

## 2022-04-21 RX ORDER — NALOXONE HYDROCHLORIDE 0.4 MG/ML
0.4 INJECTION, SOLUTION INTRAMUSCULAR; INTRAVENOUS; SUBCUTANEOUS
Status: DISCONTINUED | OUTPATIENT
Start: 2022-04-21 | End: 2022-04-21 | Stop reason: HOSPADM

## 2022-04-21 RX ORDER — ONDANSETRON 2 MG/ML
4 INJECTION INTRAMUSCULAR; INTRAVENOUS EVERY 30 MIN PRN
Status: DISCONTINUED | OUTPATIENT
Start: 2022-04-21 | End: 2022-04-21 | Stop reason: HOSPADM

## 2022-04-21 RX ORDER — ACETAMINOPHEN 325 MG/1
975 TABLET ORAL ONCE
Status: COMPLETED | OUTPATIENT
Start: 2022-04-21 | End: 2022-04-21

## 2022-04-21 RX ORDER — SODIUM CHLORIDE, SODIUM LACTATE, POTASSIUM CHLORIDE, CALCIUM CHLORIDE 600; 310; 30; 20 MG/100ML; MG/100ML; MG/100ML; MG/100ML
INJECTION, SOLUTION INTRAVENOUS CONTINUOUS PRN
Status: DISCONTINUED | OUTPATIENT
Start: 2022-04-21 | End: 2022-04-21

## 2022-04-21 RX ORDER — SODIUM CHLORIDE, SODIUM GLUCONATE, SODIUM ACETATE, POTASSIUM CHLORIDE AND MAGNESIUM CHLORIDE 526; 502; 368; 37; 30 MG/100ML; MG/100ML; MG/100ML; MG/100ML; MG/100ML
INJECTION, SOLUTION INTRAVENOUS CONTINUOUS PRN
Status: DISCONTINUED | OUTPATIENT
Start: 2022-04-21 | End: 2022-04-21

## 2022-04-21 RX ORDER — GLYCOPYRROLATE 0.2 MG/ML
INJECTION, SOLUTION INTRAMUSCULAR; INTRAVENOUS PRN
Status: DISCONTINUED | OUTPATIENT
Start: 2022-04-21 | End: 2022-04-21

## 2022-04-21 RX ORDER — EPHEDRINE SULFATE 50 MG/ML
INJECTION, SOLUTION INTRAMUSCULAR; INTRAVENOUS; SUBCUTANEOUS PRN
Status: DISCONTINUED | OUTPATIENT
Start: 2022-04-21 | End: 2022-04-21

## 2022-04-21 RX ORDER — PROPOFOL 10 MG/ML
INJECTION, EMULSION INTRAVENOUS PRN
Status: DISCONTINUED | OUTPATIENT
Start: 2022-04-21 | End: 2022-04-21

## 2022-04-21 RX ORDER — LIDOCAINE HYDROCHLORIDE 20 MG/ML
INJECTION, SOLUTION INFILTRATION; PERINEURAL PRN
Status: DISCONTINUED | OUTPATIENT
Start: 2022-04-21 | End: 2022-04-21

## 2022-04-21 RX ORDER — PROPOFOL 10 MG/ML
INJECTION, EMULSION INTRAVENOUS CONTINUOUS PRN
Status: DISCONTINUED | OUTPATIENT
Start: 2022-04-21 | End: 2022-04-21

## 2022-04-21 RX ORDER — FENTANYL CITRATE 50 UG/ML
25-50 INJECTION, SOLUTION INTRAMUSCULAR; INTRAVENOUS
Status: DISCONTINUED | OUTPATIENT
Start: 2022-04-21 | End: 2022-04-21 | Stop reason: HOSPADM

## 2022-04-21 RX ORDER — FLUMAZENIL 0.1 MG/ML
0.2 INJECTION, SOLUTION INTRAVENOUS
Status: DISCONTINUED | OUTPATIENT
Start: 2022-04-21 | End: 2022-04-21 | Stop reason: HOSPADM

## 2022-04-21 RX ORDER — OXYCODONE HYDROCHLORIDE 5 MG/1
5 TABLET ORAL EVERY 6 HOURS PRN
Qty: 6 TABLET | Refills: 0 | Status: SHIPPED | OUTPATIENT
Start: 2022-04-21 | End: 2022-04-21

## 2022-04-21 RX ORDER — OXYCODONE HYDROCHLORIDE 5 MG/1
5 TABLET ORAL EVERY 4 HOURS PRN
Status: DISCONTINUED | OUTPATIENT
Start: 2022-04-21 | End: 2022-04-21 | Stop reason: HOSPADM

## 2022-04-21 RX ORDER — ONDANSETRON 4 MG/1
4 TABLET, ORALLY DISINTEGRATING ORAL EVERY 30 MIN PRN
Status: DISCONTINUED | OUTPATIENT
Start: 2022-04-21 | End: 2022-04-21 | Stop reason: HOSPADM

## 2022-04-21 RX ORDER — CEFAZOLIN SODIUM/WATER 2 G/20 ML
2 SYRINGE (ML) INTRAVENOUS SEE ADMIN INSTRUCTIONS
Status: DISCONTINUED | OUTPATIENT
Start: 2022-04-21 | End: 2022-04-21 | Stop reason: HOSPADM

## 2022-04-21 RX ORDER — OXYCODONE HYDROCHLORIDE 5 MG/1
5 TABLET ORAL EVERY 6 HOURS PRN
Qty: 6 TABLET | Refills: 0 | Status: SHIPPED | OUTPATIENT
Start: 2022-04-21 | End: 2024-02-29

## 2022-04-21 RX ORDER — FENTANYL CITRATE 50 UG/ML
INJECTION, SOLUTION INTRAMUSCULAR; INTRAVENOUS PRN
Status: DISCONTINUED | OUTPATIENT
Start: 2022-04-21 | End: 2022-04-21

## 2022-04-21 RX ORDER — HEPARIN SODIUM 1000 [USP'U]/ML
INJECTION, SOLUTION INTRAVENOUS; SUBCUTANEOUS PRN
Status: DISCONTINUED | OUTPATIENT
Start: 2022-04-21 | End: 2022-04-21

## 2022-04-21 RX ORDER — PROTAMINE SULFATE 10 MG/ML
INJECTION, SOLUTION INTRAVENOUS PRN
Status: DISCONTINUED | OUTPATIENT
Start: 2022-04-21 | End: 2022-04-21

## 2022-04-21 RX ORDER — BUPIVACAINE HYDROCHLORIDE 5 MG/ML
INJECTION, SOLUTION EPIDURAL; INTRACAUDAL
Status: COMPLETED | OUTPATIENT
Start: 2022-04-21 | End: 2022-04-21

## 2022-04-21 RX ADMIN — BUPIVACAINE HYDROCHLORIDE 20 ML: 5 INJECTION, SOLUTION EPIDURAL; INTRACAUDAL at 07:07

## 2022-04-21 RX ADMIN — FENTANYL CITRATE 25 MCG: 50 INJECTION, SOLUTION INTRAMUSCULAR; INTRAVENOUS at 09:34

## 2022-04-21 RX ADMIN — Medication 5 MG: at 10:46

## 2022-04-21 RX ADMIN — Medication 5 MG: at 09:43

## 2022-04-21 RX ADMIN — Medication 2 G: at 07:42

## 2022-04-21 RX ADMIN — Medication 5 MG: at 09:01

## 2022-04-21 RX ADMIN — LIDOCAINE HYDROCHLORIDE 40 MG: 20 INJECTION, SOLUTION INFILTRATION; PERINEURAL at 07:48

## 2022-04-21 RX ADMIN — SODIUM CHLORIDE, SODIUM GLUCONATE, SODIUM ACETATE, POTASSIUM CHLORIDE AND MAGNESIUM CHLORIDE: 526; 502; 368; 37; 30 INJECTION, SOLUTION INTRAVENOUS at 07:44

## 2022-04-21 RX ADMIN — ACETAMINOPHEN 975 MG: 325 TABLET ORAL at 06:53

## 2022-04-21 RX ADMIN — FENTANYL CITRATE 25 MCG: 50 INJECTION, SOLUTION INTRAMUSCULAR; INTRAVENOUS at 07:11

## 2022-04-21 RX ADMIN — GLYCOPYRROLATE 0.1 MG: 0.2 INJECTION, SOLUTION INTRAMUSCULAR; INTRAVENOUS at 08:37

## 2022-04-21 RX ADMIN — FENTANYL CITRATE 25 MCG: 50 INJECTION, SOLUTION INTRAMUSCULAR; INTRAVENOUS at 08:14

## 2022-04-21 RX ADMIN — ONDANSETRON 4 MG: 2 INJECTION INTRAMUSCULAR; INTRAVENOUS at 10:52

## 2022-04-21 RX ADMIN — MIDAZOLAM 1 MG: 1 INJECTION INTRAMUSCULAR; INTRAVENOUS at 07:13

## 2022-04-21 RX ADMIN — HEPARIN SODIUM 2000 UNITS: 1000 INJECTION INTRAVENOUS; SUBCUTANEOUS at 10:04

## 2022-04-21 RX ADMIN — PROPOFOL 65 MCG/KG/MIN: 10 INJECTION, EMULSION INTRAVENOUS at 10:35

## 2022-04-21 RX ADMIN — Medication 5 MG: at 08:37

## 2022-04-21 RX ADMIN — Medication 5 MG: at 08:46

## 2022-04-21 RX ADMIN — PROTAMINE SULFATE 45 MG: 10 INJECTION, SOLUTION INTRAVENOUS at 10:44

## 2022-04-21 RX ADMIN — PROPOFOL 30 MG: 10 INJECTION, EMULSION INTRAVENOUS at 07:54

## 2022-04-21 RX ADMIN — HEPARIN SODIUM 3000 UNITS: 1000 INJECTION INTRAVENOUS; SUBCUTANEOUS at 09:20

## 2022-04-21 RX ADMIN — Medication 10 MG: at 08:25

## 2022-04-21 RX ADMIN — PROTAMINE SULFATE 5 MG: 10 INJECTION, SOLUTION INTRAVENOUS at 10:41

## 2022-04-21 RX ADMIN — Medication 5 MG: at 09:52

## 2022-04-21 RX ADMIN — HEPARIN SODIUM 5000 UNITS: 1000 INJECTION INTRAVENOUS; SUBCUTANEOUS at 08:22

## 2022-04-21 RX ADMIN — MEPIVACAINE HYDROCHLORIDE 5 ML: 20 INJECTION, SOLUTION EPIDURAL; INFILTRATION at 07:07

## 2022-04-21 RX ADMIN — HEPARIN SODIUM 3000 UNITS: 1000 INJECTION INTRAVENOUS; SUBCUTANEOUS at 09:26

## 2022-04-21 RX ADMIN — PROPOFOL 100 MCG/KG/MIN: 10 INJECTION, EMULSION INTRAVENOUS at 07:50

## 2022-04-21 NOTE — ANESTHESIA POSTPROCEDURE EVALUATION
Patient: Johnny Bingham    Procedure: Procedure(s):  CREATION, ARTERIOVENOUS FISTULA, UPPER EXTREMITY with immediate ligation, brachial, radial, and ulnar thrombectomy       Anesthesia Type:  MAC    Note:  Disposition: Outpatient   Postop Pain Control: Uneventful            Sign Out: Well controlled pain   PONV: No   Neuro/Psych: Uneventful            Sign Out: Acceptable/Baseline neuro status   Airway/Respiratory: Uneventful            Sign Out: Acceptable/Baseline resp. status   CV/Hemodynamics: Uneventful            Sign Out: Acceptable CV status; No obvious hypovolemia; No obvious fluid overload   Other NRE: NONE   DID A NON-ROUTINE EVENT OCCUR? No           Last vitals:  Vitals Value Taken Time   /88 04/21/22 1135   Temp 37  C (98.6  F) 04/21/22 1120   Pulse 72 04/21/22 1135   Resp 16 04/21/22 1135   SpO2 98 % 04/21/22 1208   Vitals shown include unvalidated device data.    Electronically Signed By: Sea Piña MD  April 21, 2022  12:09 PM

## 2022-04-21 NOTE — BRIEF OP NOTE
[facility]    Brief Operative Note    Pre-operative diagnosis: ESRD on hemodialysis (H) [N18.6, Z99.2]  Post-operative diagnosis Same as pre-operative diagnosis    Procedure: Procedure(s):    1. Creation of brachiobasilic fistula ( first stage)  2. Brachial, radial, and ulnar thrombectomy  3. Ligation of brachiobasilic fistula     Surgeon: Surgeon(s) and Role:  Panel 1:     * Alma Rosa Mandel MD - Primary     * Trae Whyte MD - Fellow - Assisting  Anesthesia: Combined MAC with Supraclavicular Block   Estimated Blood Loss: 20 ml     Drains: None  Specimens: * No specimens in log *  Findings:   Brachiobasilic anatomosis created, no ulnar or radial pulse afterwards. Venotomy made at level of anastomosis, no technical defect noted but patient had no backbleeding or inflow. #3 dima embolectomy catheter passed proximally with return of inflow but no removal of clot. Additional incision made in forearm at bifurcation and embolectomy of radial and ulnar arteries performed with excellent backbleeding. Element of vasospasm present but patient had signals at ulnar and radial after embolectomy (without return of clot). However on opening up outflow to basilic vein , ulnar signal disappeared, prompting fistula ligation. Palpable radial and ulnar pulse present.     Will need angiogram (CTA) to rule out inflow disease (axillary/subclavian). Will arrange for outpatient CTA    Complications: None.  Implants: * No implants in log *

## 2022-04-21 NOTE — OR NURSING
Block completed by anesthesia team R subclavicular block. Pt stable   /94  HR 52  RR 14  No unwanted effects

## 2022-04-21 NOTE — DISCHARGE INSTRUCTIONS
[facility], [place] // Same-Day Surgery // Adult Discharge Orders & Instructions     Take it easy when you get home.  Remember, same day surgery DOES NOT MEAN SAME DAY RECOVERY! Healing is a gradual process.   You will need some time to recover- you may be more tired than you realize at first.  Rest and relax for at least the first 24 hours at home.  You'll feel better and heal faster if you take good care of yourself.     ANESTHESIA RECOVERY -   For 24 hours after surgery    Get plenty of rest.  A responsible adult must stay with you for at least 24 hours after you leave the hospital.   Do not drive or use heavy equipment.  If you have weakness or tingling, don't drive or use heavy equipment until this feeling goes away.  Do not drink alcohol.  Avoid strenuous or risky activities.  Ask for help when climbing stairs.   You may feel lightheaded.  IF so, sit for a few minutes before standing.  Have someone help you get up.   If you have nausea (feel sick to your stomach): Drink only clear liquids such as apple juice, ginger ale, broth or 7-Up.  Rest may also help.  Be sure to drink enough fluids.  Move to a regular diet as you feel able.  You may have a slight fever. Call the doctor if your fever is over 100 F (37.7 C) (taken under the tongue) or lasts longer than 24 hours.  You may have a dry mouth, a sore throat, muscle aches or trouble sleeping.  These should go away after 24 hours.  Do not make important or legal decisions.     Call your doctor for any of the following:  Chest pain, and/or shortness of breath  Abdominal  pain, bloating or cramping that has not improved or does not respond to pain reliving medications (Tylenol or narcotics if prescribed)   Difficulty swallowing or feeling as though food or liquids are stuck in your throat   Sore throat lasting more than 2 days or pain that has worsened over time   Black or tarry stools   Nausea and/or vomiting that is not resolving or  has not responded to anti-nausea medications prescribed to you   It has been over 8 to 10 hours since surgery and you are still not able to urinate (pass water)   Headache for over 24 hours   Fever over 100.5 F (38 C) lasting more than 24 hours after the procedure   Signs of jaundice or blockage (fever, chills, abdominal pain, yellowing of the whites of your eyes, yellowing of your skin, and/or passing darker than normal urine)     To contact a doctor, call:  Dr. Mandel at [phone] and ask for the resident on call for Vascular Surgery (answered 24 hours a day)  Emergency Department: [facility]: [phone] (TTY for hearing impaired: [phone])

## 2022-04-21 NOTE — ANESTHESIA PREPROCEDURE EVALUATION
Anesthesia Pre-Procedure Evaluation    Patient: Johnny Bingham   MRN: [id number] : 1975        Procedure : Procedure(s):  CREATION, ARTERIOVENOUS FISTULA, UPPER EXTREMITY- forearm loop graft          Past Medical History:   Diagnosis Date     Cerebral infarction (H) 2021     CKD (chronic kidney disease) stage 5, GFR less than 15 ml/min (H)      History of anemia due to CKD 4/15/2022     Hypertension      Tobacco use disorder 2022      Past Surgical History:   Procedure Laterality Date     IR CVC TUNNEL PLACEMENT > 5 YRS OF AGE  5/10/2021     IR CVC TUNNEL REVISION RIGHT  10/6/2021     IR FOLLOW UP VISIT OUTPATIENT  2022     NO HISTORY OF SURGERY        No Known Allergies   Social History     Tobacco Use     Smoking status: Current Every Day Smoker     Packs/day: 0.50     Types: Cigarettes     Start date:      Smokeless tobacco: Never Used   Substance Use Topics     Alcohol use: Not Currently      Wt Readings from Last 1 Encounters:   22 49.9 kg (110 lb 0.2 oz)              OUTSIDE LABS:  CBC:   Lab Results   Component Value Date    WBC 5.8 2022    WBC 4.8 10/12/2021    HGB 12.1 (L) 2022    HGB 10.8 (L) 10/12/2021    HCT 38.2 (L) 2022    HCT 33.5 (L) 10/12/2021     2022     10/12/2021     BMP:   Lab Results   Component Value Date     (L) 10/12/2021     2021    POTASSIUM 4.5 10/12/2021    POTASSIUM 6.1 (HH) 2021    CHLORIDE 95 10/12/2021    CHLORIDE 108 2021    CO2 23 10/12/2021    CO2 22 2021    BUN 35 (H) 10/12/2021     (HH) 2021    CR 9.18 (HH) 10/12/2021    CR 10.90 (HH) 2021    GLC 91 10/12/2021    GLC 91 2021     COAGS:   Lab Results   Component Value Date    INR 0.93 2022     POC: No results found for: BGM, HCG, HCGS  HEPATIC:   Lab Results   Component Value Date    ALBUMIN 3.5 2021    PROTTOTAL 8.0 2021    ALT 20 2021    AST 18 2021    ALKPHOS 42  09/17/2021    BILITOTAL 0.6 09/17/2021     OTHER:   Lab Results   Component Value Date    BEAU 9.8 10/12/2021    PHOS 3.1 05/17/2021    MAG 2.5 (H) 09/11/2020    TSH 3.86 10/12/2021       Anesthesia Plan    ASA Status:  2      Anesthesia Type: MAC.   Induction: Propofol.           Consents    Anesthesia Plan(s) and associated risks, benefits, and realistic alternatives discussed. Questions answered and patient/representative(s) expressed understanding.    - Discussed: Risks, Benefits and Alternatives for BOTH SEDATION and the PROCEDURE were discussed     - Discussed with:          Postoperative Care    Pain management: IV analgesics.   PONV prophylaxis: Ondansetron (or other 5HT-3)     Comments:                Rody Lomas MD

## 2022-04-21 NOTE — OR NURSING
Patient is ready to discharge. Dr. Piña will put in sign out. Discharge instructions completed with [place] , Dr. Mandel, patient and brother Vamsi over the phone. All post op instructions reviewed, questions answered and paperwork given to patient. IV out, belongings returned. Will discharge via wheelchair to home with brother.

## 2022-04-21 NOTE — ANESTHESIA CARE TRANSFER NOTE
Patient: Johnny Bingham    Procedure: Procedure(s):  CREATION, ARTERIOVENOUS FISTULA, UPPER EXTREMITY with immediate ligation, brachial, radial, and ulnar thrombectomy       Diagnosis: ESRD on hemodialysis (H) [N18.6, Z99.2]  Diagnosis Additional Information: No value filed.    Anesthesia Type:   MAC     Note:    Oropharynx: oropharynx clear of all foreign objects and spontaneously breathing  Level of Consciousness: awake  Oxygen Supplementation: room air    Independent Airway: airway patency satisfactory and stable  Dentition: dentition unchanged  Vital Signs Stable: post-procedure vital signs reviewed and stable  Report to RN Given: handoff report given  Patient transferred to: Phase II    Handoff Report: Identifed the Patient, Identified the Reponsible Provider, Reviewed the pertinent medical history, Discussed the surgical course, Reviewed Intra-OP anesthesia mangement and issues during anesthesia, Set expectations for post-procedure period and Allowed opportunity for questions and acknowledgement of understanding      Vitals:  Vitals Value Taken Time   /78 04/21/22 1120   Temp     Pulse 75 04/21/22 1120   Resp     SpO2 98 % 04/21/22 1123   Vitals shown include unvalidated device data.    Electronically Signed By: LUIS ENRIQUE Bridges CRNA  April 21, 2022  11:24 AM

## 2022-04-21 NOTE — ANESTHESIA PROCEDURE NOTES
Brachial plexus Procedure Note    Pre-Procedure   Staff -        Anesthesiologist:  Milan Dunbar MD       Resident/Fellow: Baldomero Alvarez MD       Performed By: resident       Location: pre-op       Procedure Start/Stop Times: 4/21/2022 7:07 AM and 4/21/2022 7:15 AM       Pre-Anesthestic Checklist: patient identified, IV checked, site marked, risks and benefits discussed, informed consent, monitors and equipment checked, pre-op evaluation, at physician/surgeon's request and post-op pain management  Timeout:       Correct Patient: Yes        Correct Procedure: Yes        Correct Site: Yes        Correct Position: Yes        Correct Laterality: Yes        Site Marked: Yes  Procedure Documentation  Procedure: Brachial plexus       Laterality: right       Patient Position: sitting       Skin prep: Chloraprep (supraclavicular approach).       Needle Type: insulated       Needle Gauge: 21.        Needle Length (millimeters): 110                 1. Ultrasound was used to identify targeted nerve, plexus, vascular marker, or fascial plane and place a needle adjacent to it in real-time.       2. Ultrasound was used to visualize the spread of anesthetic in close proximity to the above referenced structure.  Not ultrasound guided       3. A permanent image is entered into the patient's record.    Assessment/Narrative         The placement was negative for: blood aspirated, painful injection and site bleeding       Paresthesias: No.       Bolus given via needle..        Secured via.        Insertion/Infusion Method: Single Shot    Medication(s) Administered   Bupivacaine 0.5% PF (Infiltration) - Infiltration   20 mL - 4/21/2022 7:07:00 AM  Mepivacaine 2% PF (Perineural) - Perineural   5 mL - 4/21/2022 7:07:00 AM  Medication Administration Time: 4/21/2022 7:07 AM

## 2022-04-22 DIAGNOSIS — T82.898A PROBLEM WITH DIALYSIS ACCESS, INITIAL ENCOUNTER (H): ICD-10-CM

## 2022-04-22 DIAGNOSIS — N18.6 ESRD ON HEMODIALYSIS (H): ICD-10-CM

## 2022-04-22 DIAGNOSIS — Z99.2 ESRD ON HEMODIALYSIS (H): ICD-10-CM

## 2022-04-22 DIAGNOSIS — Z98.890 POSTOPERATIVE STATE: Primary | ICD-10-CM

## 2022-04-25 ENCOUNTER — CARE COORDINATION (OUTPATIENT)
Dept: NEPHROLOGY | Facility: CLINIC | Age: 47
End: 2022-04-25
Payer: COMMERCIAL

## 2022-04-25 ENCOUNTER — TELEPHONE (OUTPATIENT)
Dept: VASCULAR SURGERY | Facility: CLINIC | Age: 47
End: 2022-04-25
Payer: COMMERCIAL

## 2022-04-25 NOTE — PROGRESS NOTES
Received call from [facility] on 4/22/22 with concern that new AVF did not have a thrill or bruit.  Per op report, appeared that AVF creation was unsuccessful, which was reported to dialysis RN.    Reached out to Dr. Mandel confirming AVF creation was unsuccessful and to see what the plan is for follow up/ future access.    Dr. Mandel's response:  Alma Rosa Mandel MD Guidarelli, Kelsey, RN; Tatiana Foster RN  I had a very, very long talk with both him and his brother with a . He does not have a functioning fistula as he lost all flow to his hand when I made the fistula so I had to ligate it. I made this very, very clear to him and his brother. He is getting further workup to see if there is an arterial stenosis more proximal causing the issue and will have follow up in my clinic to determine plans moving forward.     Will update [facility] and continue to follow as needed.    DEXTER RUBI RN on 4/25/2022 at 12:05 PM  Dialysis Access Care Coordinator  Phone: [phone]

## 2022-04-25 NOTE — TELEPHONE ENCOUNTER
Spoke with Johnny regarding how pt is doing s/p dialysis access surgery. Post-op call made with [place] .    Pt denies pain at this time and is not taking any pain medication. The incision is WNL and free of signs of infection. Pt does report numbness in his arm and hand, which we discussed will start to improve in the coming weeks. Pt also reports weakness in his arm and hand that is bothersome to him. Pt reports they are eating and drinking w/o difficulty. Pt is voiding and has had a bowel movement.    I explained that he will need to see Dr Mandel soon for further workup for his dialysis access dt previous procedure failing. Pt states he is unable to come to clinic before 8:30. I scheduled him for follow-up on 5/17 which was our soonest available late afternoon slot.    VQI measures: NA    Confirmed follow up appointments and provided callback number for further questions/concerns.    SEGUNDO Alatorre, RN  RN - P Vascular Surgery  Ph: [phone]  Fax: [phone]

## 2022-04-27 NOTE — OP NOTE
Date: April 21, 2022       Preop Dx: End stage renal disease on dialysis    Postop Dx: Same    Procedure:   1. right brachiobasilic fistula   2. Right brachial, ulnar and radial thrombectomy  3. Right brachiobasilic fistula ligation       Surgeon: Alma Rosa Mandel MD    Assistant: Trae Whyte MD Fellow      Anesthesia: Supraclavicular block and MAC      Complications: None      EBL: 20cc    Specimens: None      Indications: This 46 year old male has a history of end stage renal disease and began dialysis through a tunneled line. On vein mapping, he had a marginal basilic vein so we planned for brachiobasilic vs forearm loop graft depending on intraoperative ultrasound findings. He understood the risks and benefits and wished to proceed and was spoken to with a .      Procedure in Detail:  The patient was brought to the operating room and placed in the supine position. A supraclavicular nerve block was performed in the pre-operative area.  The patient was placed under MAC anesthesia. I examined his right arm with ultrasound and identified a large basilic vein measuring 3.5mm and larger throughout. Because of this, we elected to proceed with a right brachiobasilic first stage fistula.  His right arm was prepped and draped in usual sterile fashion.  A timeout was performed.    An incision was made transversely just proximal to the antecubital fossa.  Soft tissue was dissected down using Bovie electrocautery to the level of the brachial sheath. The brachial sheath was entered using Metzenbaum scissors and opened the length of the incision.  First the basilic vein was dissected free medially.This was dissected out further distally towards the antecubital fossa as far distally within the wound as possible. The vein was then further dissected proximally. All branches were ligated using silk ties. Next the brachial artery was identified between the heads of the biceps tendons. The brachial artery was dissected  free circumferentially throughout the course of the incision.     The patient was then heparinized with 5000 units of IV heparin.  The vein was clamped distally using a right angle and the vein was transected. The distal vein was ligated with a silk tie and the clamp removed. The vein was dilated using coronary dilators with first a 2mm, then 2.5mm, then 3mm dilator without difficulty. The brachial artery was looped with vessel loops and these were tightened for arterial control.  An arteriotomy was then performed on the anterior medial surface using 11 blade scalpel.  The arteriotomy was extended using mcelroy scissors to measure 6-7mm in size.  The vein was then beveled using Mcelroy scissors to match the 6 mm arteriotomy that was made.  An end-to-side anastomosis was then performed using 6-0 Prolene in a running fashion.  Prior to completion of the anastomosis, the arteries were flushed back as well as the vein allowed to backbleed.  The anastomosis was irrigated with heparinized saline.  Anastomosis was then completed and all clamps were removed. The patient was noted to have a poor thrill throughout the vein as well as a absent radial and ulnar pulse. There was a signal in the brachial artery distally but poor. There was concern that there could be a technical issue with the anastomosis causing both poor arterial flow distally as well as no thrill in the fistula. Because of this, the artery was occluded with the vessel loops and another 3000 units of IV heparin given. A transverse venotomy was made in the vein cha overlying the anastomosis. There was no clot within the anastomosis. There was no technical issue. The fistula outflow was clamped with a heifetz clamp. When the vessel loops were loosened on the brachial artery, there was no flow proximal or distal. The patient was given an additional 2000 units of IV heparin.  A #3 dima catheter was passed proximally into the axillary artery without issue. No clot  was obtained but there was no pulsatile inflow. The dima was passed distally down the arm. No clot was retrieved but there was still no back bleeding. The venotomy was closed with a 6-0 prolene in a running fashion.     An ultrasound was used to examine the brachial artery and it appeared the flow stopped at the brachial bifurcation. A separate transverse incision was made distal to the antecubital fossa. The brachial bifurcation and radial and ulnar artery origins were dissected out and vessel loops placed around. A transverse arteriotomy was made in the brachial artery just proximal to the bifurcation. There was minimal back bleeding from the ulnar artery and none from the radial artery. A 3 dima was sent down both vessels without issue. There was no clot retrieved but now had back bleeding. There was concern for significant vasospasm so no further thrombectomy was performed. The arteriotomy was closed with a prolene suture in a unit stitch fashion. There was a good signal both proximal and distal to the arteriotomy and in both the ulnar and radial arteries. As we waited, the patient developed signals further down the arm and eventually had a palpable pulse at the wrist in the radial artery and a strong ulnar signal. At this point, the clamp was taken off of the fistula outflow and the ulnar signal completely disappeared. At this time, there was still not a good thrill in the fistula. This led us to believe there must be a more significant proximal arterial inflow issue that would need to be examined. The fistula had to be ligated to restore flow to the hand. A 2-0 silk tie was used to ligate the fistula just past the anastomosis. There was again an excellent radial pulse and strong ulnar signal at the wrist after this.     The wound was irrigated and hemostasis obtained. The soft tissue overlying was closed using a running 3-0 Vicryl suture.  Skin was closed using 4-0 Monocryl in a running subcuticular  fashion.  Exofin skin glue was placed over both incisions. At completion, the patient had a palpable radial pulse and strong ulnar signal at the wrist.     The patient appeared to have tolerated the procedure well. Sponge, needle and instrument counts were reported as correct at the end of the case. I was present and participated throughout the entire procedure.     Alma Rosa Mandel MD, RPVI  , Vascular Surgery  [facility]  Cell: [phone]  [email or web address]

## 2022-04-29 ENCOUNTER — HOSPITAL ENCOUNTER (OUTPATIENT)
Facility: CLINIC | Age: 47
Setting detail: OBSERVATION
Discharge: LEFT BEFORE DISCHARGE | End: 2022-04-30
Attending: EMERGENCY MEDICINE | Admitting: SURGERY
Payer: COMMERCIAL

## 2022-04-29 DIAGNOSIS — Z20.822 COVID-19 RULED OUT BY LABORATORY TESTING: ICD-10-CM

## 2022-04-29 DIAGNOSIS — R29.898 RIGHT HAND WEAKNESS: ICD-10-CM

## 2022-04-29 PROBLEM — M79.602 ARM PAIN, LEFT: Status: ACTIVE | Noted: 2022-04-29

## 2022-04-29 PROCEDURE — 99285 EMERGENCY DEPT VISIT HI MDM: CPT | Mod: 25 | Performed by: EMERGENCY MEDICINE

## 2022-04-29 PROCEDURE — 99285 EMERGENCY DEPT VISIT HI MDM: CPT | Performed by: EMERGENCY MEDICINE

## 2022-04-29 PROCEDURE — G0378 HOSPITAL OBSERVATION PER HR: HCPCS

## 2022-04-29 NOTE — TELEPHONE ENCOUNTER
I contacted Johnny with an  to check on his post-op symptoms. He reports he is still having weakness in his thumb and index finger and has difficulty holding objects in his hand. He is otherwise able to move his fingers, hand, and arm. He denies numbness/tingling. He denies any discoloration to his hand. He denies pain, besides some pain around his incision. He has some swelling in his arm but he states this continues to improve. I asked him to please contact our on-call provider over the weekend if things worsen. He verbalized understanding.    SEGUNDO Alatorre, RN  [facility] - [facility] Vascular Surgery  Ph: [phone]  Fax: [phone]

## 2022-04-30 VITALS
TEMPERATURE: 99.1 F | HEART RATE: 59 BPM | OXYGEN SATURATION: 99 % | SYSTOLIC BLOOD PRESSURE: 129 MMHG | RESPIRATION RATE: 18 BRPM | DIASTOLIC BLOOD PRESSURE: 93 MMHG

## 2022-04-30 LAB — SARS-COV-2 RNA RESP QL NAA+PROBE: NEGATIVE

## 2022-04-30 PROCEDURE — U0005 INFEC AGEN DETEC AMPLI PROBE: HCPCS | Performed by: EMERGENCY MEDICINE

## 2022-04-30 PROCEDURE — G0378 HOSPITAL OBSERVATION PER HR: HCPCS

## 2022-04-30 PROCEDURE — C9803 HOPD COVID-19 SPEC COLLECT: HCPCS | Performed by: EMERGENCY MEDICINE

## 2022-04-30 ASSESSMENT — ENCOUNTER SYMPTOMS
DIFFICULTY URINATING: 0
NERVOUS/ANXIOUS: 0
HEADACHES: 0
ABDOMINAL PAIN: 0
VOMITING: 0
NAUSEA: 0
FEVER: 0
CHILLS: 0
NECK PAIN: 0
BACK PAIN: 0
SHORTNESS OF BREATH: 0

## 2022-04-30 NOTE — TELEPHONE ENCOUNTER
Discussed pt's symptoms with Dr. Mandel. I called pt with  present and clarified with pt that weakness in his index finger/thumb is getting worse, not improving. Since symptoms are not improving, MD recommends pt proceed immediately to ED for evaluation. Pt states he would like to come in tomorrow. I instructed pt to proceed to [facility] ED or [facility] immediately. Pt states he will come in at 9:30 this evening. I again instructed pt to be seen urgently. Pt says he cannot come until 9:30. I gave pt the address to the [facility] ED and updated Dr. Mandel.    MICHELLE AlatorreN, RN  [facility] - [facility] Vascular Surgery  Ph: [phone]  Fax: [phone]

## 2022-04-30 NOTE — ED NOTES
Pt left the hospital without being discharged. Pt was let out of the ED by a volunteer staff who believed the pt was discharged. Pt was found at [facility], attempt was made to get the pt to come back in the ED and wait to talk to the Dr. Pt refused and left the premises. Admitting provider was notified.

## 2022-04-30 NOTE — ED PROVIDER NOTES
[place] EMERGENCY DEPARTMENT ([facility])  4/29/22  History     Chief Complaint   Patient presents with     Post-op Problem     HPI  Johnny Bingham is a 46 year old male who has a significant medical history of ESRD on dialysis since 2021, renal hyperparathyroidism, hypertension, and Hx of bilateral subdural hematoma s/p right-sided craniotomy (6/292021) who presents to the Emergency Department with c/o first and second finger weakness on right hand since fistula placement on 4/21/2022.   During fistula creation, patient lost flow to his hand so the fistula had to be ligated.  Currently does not have a functioning fistula.  Today, presents with weakness to his right index finger and right thumb.  Vascular surgery aware.  He denies any numbness/tingling, fever/chills, and trauma, or any other medical complaints        Past Medical History  Past Medical History:   Diagnosis Date     Cerebral infarction (H) 06/2021     CKD (chronic kidney disease) stage 5, GFR less than 15 ml/min (H)      History of anemia due to CKD 4/15/2022     Hypertension      Tobacco use disorder 4/14/2022     Past Surgical History:   Procedure Laterality Date     CREATE FISTULA ARTERIOVENOUS UPPER EXTREMITY Right 4/21/2022    Procedure: CREATION, ARTERIOVENOUS FISTULA, UPPER EXTREMITY with immediate ligation, brachial, radial, and ulnar thrombectomy;  Surgeon: Alma Rosa Mandel MD;  Location: [facility] OR     IR CVC TUNNEL PLACEMENT > 5 YRS OF AGE  5/10/2021     IR CVC TUNNEL REVISION RIGHT  10/6/2021     IR FOLLOW UP VISIT OUTPATIENT  2/14/2022     NO HISTORY OF SURGERY       carvedilol (COREG) 12.5 MG tablet  lisinopril (ZESTRIL) 20 MG tablet  oxyCODONE (ROXICODONE) 5 MG tablet      No Known Allergies  Family History  Family History   Problem Relation Age of Onset     Heart Disease Mother      Kidney Disease Father      Heart Disease Father      Diabetes Brother      Colon Cancer No family hx of      Prostate Cancer No family hx of       Social History   Social History     Tobacco Use     Smoking status: Current Every Day Smoker     Packs/day: 0.50     Types: Cigarettes     Start date: 1995     Smokeless tobacco: Never Used   Vaping Use     Vaping Use: Never used   Substance Use Topics     Alcohol use: Not Currently     Drug use: Never      Past medical history, past surgical history, medications, allergies, family history, and social history were reviewed with the patient. No additional pertinent items.     I have reviewed the Medications, Allergies, Past Medical and Surgical History, and Social History in the Epic system.    Review of Systems   Constitutional: Negative for chills and fever.   Respiratory: Negative for shortness of breath.    Cardiovascular: Negative for chest pain.   Gastrointestinal: Negative for abdominal pain, nausea and vomiting.   Genitourinary: Negative for difficulty urinating.   Musculoskeletal: Negative for back pain and neck pain.        R index/thumb weakness     Neurological: Negative for headaches.   Psychiatric/Behavioral: The patient is not nervous/anxious.      A complete review of systems was performed with pertinent positives and negatives noted in the HPI, and all other systems negative.    Physical Exam   BP: (!) 141/97  Pulse: 70  Temp: 99.1  F (37.3  C)  Resp: 18  SpO2: 98 %      Physical Exam  Vitals and nursing note reviewed.   Constitutional:       General: He is not in acute distress.     Appearance: Normal appearance.   HENT:      Head: Normocephalic.      Nose: Nose normal.   Eyes:      Pupils: Pupils are equal, round, and reactive to light.   Cardiovascular:      Rate and Rhythm: Normal rate and regular rhythm.   Pulmonary:      Effort: Pulmonary effort is normal.   Abdominal:      General: There is no distension.   Musculoskeletal:         General: No deformity. Normal range of motion.      Right hand: Decreased strength of finger abduction and thumb/finger opposition.      Cervical back: Normal  range of motion.   Skin:     General: Skin is warm.   Neurological:      Mental Status: He is alert and oriented to person, place, and time.   Psychiatric:         Mood and Affect: Mood normal.         ED Course     At 12:49 AM the patient was seen and examined by Scott Redman DO in Room ED28.          No results found for this or any previous visit (from the past 24 hour(s)).  Medications   melatonin tablet 1 mg (has no administration in time range)             Assessments & Plan (with Medical Decision Making)   Johnny Bingham is a 46 year old male who presents to the ED for evaluation of right index and right thumb weakness after attempted fistula formation on 4/21/2022.    On exam, patient has demonstrable weakness of the right index finger and right thumb.  Does have intact distal pulses.  No evidence of acute limb ischemia.  Patient was evaluated by the vascular surgery team.  They will admit to their service.  Will likely need hand surgery consultation in the morning as well.  Plan of care discussed with patient via Greek .    I have reviewed the nursing notes.    I have reviewed the findings, diagnosis, plan and need for follow up with the patient.    New Prescriptions    No medications on file       Final diagnoses:   Right hand weakness     I, Germán Santo, am serving as a trained medical scribe to document services personally performed by Scott Redman DO, based on the provider's statements to me.      I, Scott Redman DO, was physically present and have reviewed and verified the accuracy of this note documented by Germán Santo.    Scott Redman DO  4/29/2022   [facility] EMERGENCY DEPARTMENT     Scott Redman DO  04/30/22 0204

## 2022-04-30 NOTE — PROGRESS NOTES
"Notified that patient left AMA before US or Hand Surgery could evaluate     \"Pt left the hospital without being discharged. Pt was let out of the ED by a volunteer staff who believed the pt was discharged. Pt was found at [facility], attempt was made to get the pt to come back in the ED and wait to talk to the Dr. Pt refused and left the premises\" Unable to talk to patient because patient had already left the premises    Trae Whyte MD  Fellow     "

## 2022-04-30 NOTE — H&P
Vascular Surgery Consult    Johnny Bingham MRN# [id number]   YOB: 1975 Age: 46 year old      Date of Admission:  4/29/2022        Consult for:    Consulting physician/team: ER        Assessment:     46 year old male with ESRD with recent R brachiobasilic fistula creation with immediate ligation due to steal who presents with weakness of R thumb and index finger which is worsening over last week. Concer re medial nerve injury vs compression in forearm although distribution of weakness does not entirely correlate with that         Plan:        Forearm and arm incision C.D. Some induration/healing ridge at forearm incision but no ecchymosis or hematoma . With a palpable radial pulse, vascular etiology of numbness is very very unlikely     Hand surgery consult to help with diagnosis     US of RUE to evaluate for hematoma    Admit to Obs          History of Present Illness:    Johnny Bingham is a 46 year old male with ESRD with recent R brachiobasilic fistula creation with immediate ligation due to steal who presents with weakness of R thumb and index finger which is worsening over last week.     Reports difficulty opposing his R thumb to R index finger and some weakness in flexing R index DIP and PIP. Reports using thumb and other fingers to eat. Denies numbness or tingling.     Past Medical History:  Past Medical History:   Diagnosis Date     Cerebral infarction (H) 06/2021     CKD (chronic kidney disease) stage 5, GFR less than 15 ml/min (H)      History of anemia due to CKD 4/15/2022     Hypertension      Tobacco use disorder 4/14/2022       Past Surgical History:  Past Surgical History:   Procedure Laterality Date     CREATE FISTULA ARTERIOVENOUS UPPER EXTREMITY Right 4/21/2022    Procedure: CREATION, ARTERIOVENOUS FISTULA, UPPER EXTREMITY with immediate ligation, brachial, radial, and ulnar thrombectomy;  Surgeon: Alma Rosa Mandel MD;  Location: [facility] OR     IR CVC TUNNEL PLACEMENT > 5 YRS OF AGE   5/10/2021     IR CVC TUNNEL REVISION RIGHT  10/6/2021     IR FOLLOW UP VISIT OUTPATIENT  2/14/2022     NO HISTORY OF SURGERY         Allergies:   No Known Allergies    Medications:  No current facility-administered medications on file prior to encounter.  carvedilol (COREG) 12.5 MG tablet, Take 1 tablet (12.5 mg) by mouth 2 times daily (with meals)  lisinopril (ZESTRIL) 20 MG tablet, Take 1 tablet (20 mg) by mouth daily  oxyCODONE (ROXICODONE) 5 MG tablet, Take 1 tablet (5 mg) by mouth every 6 hours as needed for moderate to severe pain        Social History:  Social History     Socioeconomic History     Marital status: Single     Spouse name: Not on file     Number of children: Not on file     Years of education: Not on file     Highest education level: Not on file   Occupational History     Not on file   Tobacco Use     Smoking status: Current Every Day Smoker     Packs/day: 0.50     Types: Cigarettes     Start date: 1995     Smokeless tobacco: Never Used   Vaping Use     Vaping Use: Never used   Substance and Sexual Activity     Alcohol use: Not Currently     Drug use: Never     Sexual activity: Not Currently   Other Topics Concern     Parent/sibling w/ CABG, MI or angioplasty before 65F 55M? Not Asked   Social History Narrative     Not on file     Social Determinants of Health     Financial Resource Strain: Not on file   Food Insecurity: Not on file   Transportation Needs: Not on file   Physical Activity: Not on file   Stress: Not on file   Social Connections: Not on file   Intimate Partner Violence: Not on file   Housing Stability: Not on file       Family History:  Family History   Problem Relation Age of Onset     Heart Disease Mother      Kidney Disease Father      Heart Disease Father      Diabetes Brother      Colon Cancer No family hx of      Prostate Cancer No family hx of        ROS:    The remainder of the complete ROS was negative unless noted in the HPI.    Exam:  BP (!) 129/93   Pulse 59   Temp  99.1  F (37.3  C) (Oral)   Resp 18   SpO2 99%   General: Alert and oriented with appropriate responses to questions, in NAD.  HEENT: NC/AT, sclera anicteric, PERRL, EOMI, OP clear with MMM  Neck: Supple, no JVD or cervical LAD, full aROM.  Resp: clear to auscultation bilaterally, no crackles or wheezes  Cardiac: regular rate and rhythm, no murmur.  Abdomen: Soft, nontender, nondistended. No rebound or guarding.  Extremities: No LE edema, 5/5 strength, 2+ radial pulse. Weak opposition of thumb to index finger , able to easily oppose thumb to other fingers, normal adduction and abduction of all fingers, slightly wekak DIP and PIP flexion of index finger, cap refill is brisk    Skin: Warm and dry, no jaundice or rash  Neuro: Cn II-XII intact, moves all extremities equally    Labs:  Most Recent CBC:   Recent Labs   Lab Test 02/14/22  1021   WBC 5.8   RBC 4.30*   HGB 12.1*   HCT 38.2*   MCV 89   MCH 28.1   MCHC 31.7   RDW 12.8        Most Recent BMP:   Recent Labs   Lab Test 04/21/22  0731 09/12/20  0647 09/11/20  0703      < > 138   POTASSIUM 4.2   < > 4.4   CHLORIDE 102   < > 107   CO2 23   < > 27   BUN 31*   < > 50*   CR 7.17*   < > 5.84*   GLC 96   < > 99   MAG  --   --  2.5*    < > = values in this interval not displayed.           Imaging:  No results found for this or any previous visit (from the past 24 hour(s)).    Assessment/ Plan: See above.     Trae Whyte MD    Fellow  Pager: [phone]    Pt reviewed with Dr. Gastelum.

## 2022-04-30 NOTE — ED TRIAGE NOTES
Emirati  used for triage  C/o Index and thumb weakness sp fistula placement on 4/21/22 that has since worsened  Pt was contacted at home and advised to go to ER    Denies any numbness/tingling or any other areas of concern

## 2022-05-02 DIAGNOSIS — Z98.890 POSTOPERATIVE STATE: Primary | ICD-10-CM

## 2022-05-02 DIAGNOSIS — R29.898 WEAKNESS OF HAND: ICD-10-CM

## 2022-05-11 DIAGNOSIS — I10 HYPERTENSION GOAL BP (BLOOD PRESSURE) < 140/90: Primary | ICD-10-CM

## 2022-05-11 RX ORDER — AMLODIPINE BESYLATE 5 MG/1
5 TABLET ORAL DAILY
Qty: 90 TABLET | Refills: 3 | Status: SHIPPED | OUTPATIENT
Start: 2022-05-11 | End: 2022-06-20

## 2022-05-16 ENCOUNTER — APPOINTMENT (OUTPATIENT)
Dept: INTERPRETER SERVICES | Facility: CLINIC | Age: 47
End: 2022-05-16
Payer: COMMERCIAL

## 2022-05-17 ENCOUNTER — ANCILLARY PROCEDURE (OUTPATIENT)
Dept: CT IMAGING | Facility: CLINIC | Age: 47
End: 2022-05-17
Attending: SURGERY
Payer: COMMERCIAL

## 2022-05-17 ENCOUNTER — OFFICE VISIT (OUTPATIENT)
Dept: VASCULAR SURGERY | Facility: CLINIC | Age: 47
End: 2022-05-17
Payer: COMMERCIAL

## 2022-05-17 VITALS — SYSTOLIC BLOOD PRESSURE: 152 MMHG | HEART RATE: 68 BPM | DIASTOLIC BLOOD PRESSURE: 105 MMHG | OXYGEN SATURATION: 100 %

## 2022-05-17 DIAGNOSIS — N18.6 ESRD ON HEMODIALYSIS (H): ICD-10-CM

## 2022-05-17 DIAGNOSIS — T82.898A PROBLEM WITH DIALYSIS ACCESS, INITIAL ENCOUNTER (H): ICD-10-CM

## 2022-05-17 DIAGNOSIS — Z98.890 POSTOPERATIVE STATE: Primary | ICD-10-CM

## 2022-05-17 DIAGNOSIS — Z99.2 ESRD (END STAGE RENAL DISEASE) ON DIALYSIS (H): ICD-10-CM

## 2022-05-17 DIAGNOSIS — Z99.2 ESRD ON HEMODIALYSIS (H): ICD-10-CM

## 2022-05-17 DIAGNOSIS — R29.898 WEAKNESS OF HAND: ICD-10-CM

## 2022-05-17 DIAGNOSIS — N18.6 ESRD (END STAGE RENAL DISEASE) ON DIALYSIS (H): ICD-10-CM

## 2022-05-17 DIAGNOSIS — Z98.890 POSTOPERATIVE STATE: ICD-10-CM

## 2022-05-17 PROCEDURE — 99024 POSTOP FOLLOW-UP VISIT: CPT | Performed by: SURGERY

## 2022-05-17 PROCEDURE — 73206 CT ANGIO UPR EXTRM W/O&W/DYE: CPT | Mod: RT | Performed by: RADIOLOGY

## 2022-05-17 RX ORDER — IOPAMIDOL 755 MG/ML
125 INJECTION, SOLUTION INTRAVASCULAR ONCE
Status: COMPLETED | OUTPATIENT
Start: 2022-05-17 | End: 2022-05-17

## 2022-05-17 RX ADMIN — IOPAMIDOL 125 ML: 755 INJECTION, SOLUTION INTRAVASCULAR at 14:46

## 2022-05-17 ASSESSMENT — PAIN SCALES - GENERAL: PAINLEVEL: NO PAIN (0)

## 2022-05-17 NOTE — LETTER
5/17/2022       RE: Johnny Bingham  [address]  [facility] [place] [address]     Dear Colleague,    Thank you for referring your patient, Johnny Bingham, to the [facility]ULAR [facility]IN[facility] [place] [facility]. Please see a copy of my visit note below.      Assessment & Plan   Problem List Items Addressed This Visit    None     Visit Diagnoses     Postoperative state    -  Primary    Weakness of hand        ESRD (end stage renal disease) on dialysis (H)             Mr. Bingham is a 45yo who underwent right brachiobasilic fistula on 4/21/22. Intraoperatively it was noted that he did not have radial or ulnar signal but also did not have a thrill in the fistula. Eventually the fistula was ligated and arterial thrombectomy performed. Patient discharged home. Over the following days he developed right thumb and pointer finger weakness. He came to the ED on 4/30 as instructed by our team but left AMA prior to hand surgery evaluation. He is here for follow up.     - Urgent hand surgery referral placed on 5/2 for appointment within 3-5 days. This has still not been scheduled. Reached out to ortho hand scheduling team today again.   - Will await hand surgery evaluation as anatomically, his deficits do not coincide with surgery he had so unclear what is the cause and unclear what would be his risk of doing a fistula on the other arm.   - Will refer to OT for hand therapy.   - I will discuss his case at my multidisciplinary vascular conference next week to determine the best course forward for dialysis access.     Review of the result(s) of each unique test - CTA right upper extremity  Independent interpretation of a test performed by another physician/other qualified health care professional (not separately reported) - No identifiable areas of stenosis in the blood flow to the right arm. Normal flow into the hand.   40 minutes spent on the date of the encounter doing chart  review, history and exam, documentation and further activities per the note    Alma Rosa Mandel MD    [facility]ULAR [facility] HARRY Turner is a 46 year old who presents for the following health issues  accompanied by his .    TILA Turner is unchanged since surgery. He is able to put his thumb and pointer finger together but can not pick anything up due to the weakness. It has been stable. It has neither improved nor worsened. I explained with the  again what happened during surgery. We made the fistula and he lost all flow to the hand. He also did not have a thrill in the fistula. There was no technical defect as I opened the anastomosis. I performed brachial, radial and ulnar thrombectomies with no clot but able to get flow to the hand. When the fistula clamp was opened, he still didn't have a thrill but he immediately lost flow to the hand. Because of this, we had to ligate the fistula. He has no functioning fistula in the arm. CT today did not reveal any proximal stenosis that would've caused these intraoperative findings. I told him I would need to talk to my partners and see hand surgery's evaluation before we could determine how to move forward. I will also refer him to OT for hand therapy. Entire visit performed with  and confirmed he understood the intraoperative findings, imaging findings since, the need to see hand surgery and OT and that I would see him back in clinic to discuss further dialysis access options.           Objective    BP (!) 152/105 (BP Location: Left arm, Patient Position: Chair, Cuff Size: Adult Small)   Pulse 68   SpO2 100%   There is no height or weight on file to calculate BMI.  Physical Exam   GENERAL: healthy, alert and no distress  NECK: right sided tunneled line in place  RESP: no increased work of breathing  CV: regular rate, strong right radial and ulnar palpable pulses  MS: weakness in right thumb and first finger .  Weakness 3-4 out of 5. No other weakness in the hand at all.   SKIN: incisions from surgery in right arm well healed  NEURO: Normal sensation in the hand. Mild numbness on forearm consistent with recent surgery.     CTA RUE pending          Sincerely,    Alma Rosa Mandel MD

## 2022-05-17 NOTE — PATIENT INSTRUCTIONS
Thank you so much for choosing us for your care. It was a pleasure to see you at the vascular clinic today.     Follow-up recommendations: We will refer you to occupational therapy to work with your hand. We will see you back once the hand service sees you.    Additional testing/imaging ordered today: None      Our scheduling team will get in touch with you to set up any follow-up testing/imaging and/or appointments. Please be aware that any testing/imaging recommended today will need to completed prior to your next visit with the provider. If testing/imaging is not completed prior to your next visit, your visit may be rescheduled.        If you have any questions, please call Tatiana Foster RN at [phone] or contact our clinic at [phone]. We also encourage the use of Infinite[employer] to communicate with your HealthCare Provider.      If you have an urgent need after business hours (8:00 am to 4:30 pm) please call [phone], option 4, and ask for the vascular attending on call. For non-urgent after hours needs, please call the vascular nurse at [phone] and leave a detailed voicemail. For scheduling needs, please call our clinic directly at [phone].

## 2022-05-17 NOTE — PROGRESS NOTES
Assessment & Plan   Problem List Items Addressed This Visit    None     Visit Diagnoses     Postoperative state    -  Primary    Weakness of hand        ESRD (end stage renal disease) on dialysis (H)             Mr. Bingham is a 47yo who underwent right brachiobasilic fistula on 4/21/22. Intraoperatively it was noted that he did not have radial or ulnar signal but also did not have a thrill in the fistula. Eventually the fistula was ligated and arterial thrombectomy performed. Patient discharged home. Over the following days he developed right thumb and pointer finger weakness. He came to the ED on 4/30 as instructed by our team but left AMA prior to hand surgery evaluation. He is here for follow up.     - Urgent hand surgery referral placed on 5/2 for appointment within 3-5 days. This has still not been scheduled. Reached out to ortho hand scheduling team today again.   - Will await hand surgery evaluation as anatomically, his deficits do not coincide with surgery he had so unclear what is the cause and unclear what would be his risk of doing a fistula on the other arm.   - Will refer to OT for hand therapy.   - I will discuss his case at my multidisciplinary vascular conference next week to determine the best course forward for dialysis access.     Review of the result(s) of each unique test - CTA right upper extremity  Independent interpretation of a test performed by another physician/other qualified health care professional (not separately reported) - No identifiable areas of stenosis in the blood flow to the right arm. Normal flow into the hand.   40 minutes spent on the date of the encounter doing chart review, history and exam, documentation and further activities per the note    Alma Rosa Mandel MD    [facility]ASCULAR [facility] HARRY Turner is a 46 year old who presents for the following health issues  accompanied by his .    TILA Turner is unchanged since surgery. He is able to  put his thumb and pointer finger together but can not pick anything up due to the weakness. It has been stable. It has neither improved nor worsened. I explained with the  again what happened during surgery. We made the fistula and he lost all flow to the hand. He also did not have a thrill in the fistula. There was no technical defect as I opened the anastomosis. I performed brachial, radial and ulnar thrombectomies with no clot but able to get flow to the hand. When the fistula clamp was opened, he still didn't have a thrill but he immediately lost flow to the hand. Because of this, we had to ligate the fistula. He has no functioning fistula in the arm. CT today did not reveal any proximal stenosis that would've caused these intraoperative findings. I told him I would need to talk to my partners and see hand surgery's evaluation before we could determine how to move forward. I will also refer him to OT for hand therapy. Entire visit performed with  and confirmed he understood the intraoperative findings, imaging findings since, the need to see hand surgery and OT and that I would see him back in clinic to discuss further dialysis access options.           Objective    BP (!) 152/105 (BP Location: Left arm, Patient Position: Chair, Cuff Size: Adult Small)   Pulse 68   SpO2 100%   There is no height or weight on file to calculate BMI.  Physical Exam   GENERAL: healthy, alert and no distress  NECK: right sided tunneled line in place  RESP: no increased work of breathing  CV: regular rate, strong right radial and ulnar palpable pulses  MS: weakness in right thumb and first finger . Weakness 3-4 out of 5. No other weakness in the hand at all.   SKIN: incisions from surgery in right arm well healed  NEURO: Normal sensation in the hand. Mild numbness on forearm consistent with recent surgery.     CTA RUE pending

## 2022-05-23 ENCOUNTER — APPOINTMENT (OUTPATIENT)
Dept: INTERPRETER SERVICES | Facility: CLINIC | Age: 47
End: 2022-05-23
Payer: COMMERCIAL

## 2022-05-23 NOTE — TELEPHONE ENCOUNTER
DIAGNOSIS: weak  after his surgery ok'd fozia Ingram    APPOINTMENT DATE: 05/24/2022   NOTES STATUS DETAILS   OFFICE NOTE from referring provider Internal 05/17/2022, 03/08/2022 - Alma Rosa Mandel - [facility]   DISCHARGE REPORT from the ER Internal 04/29/2022 - [facility] ED   OPERATIVE REPORT Internal 04/21/2022 - [facility] 1. right brachiobasilic fistula   2. Right brachial, ulnar and radial thrombectomy  3. Right brachiobasilic fistula ligation   MEDICATION LIST EPIC    LABS     CBC/DIFF Internal Most Recent: 02/14/2022   ULTRASOUND PACS 02/01/2022, 06/22/2021 - Upper Extremity   CT SCAN PACS 05/17/2022 - CTA Upper Extremity

## 2022-05-24 ENCOUNTER — PRE VISIT (OUTPATIENT)
Dept: ORTHOPEDICS | Facility: CLINIC | Age: 47
End: 2022-05-24
Payer: COMMERCIAL

## 2022-05-26 ENCOUNTER — TELEPHONE (OUTPATIENT)
Dept: TRANSPLANT | Facility: CLINIC | Age: 47
End: 2022-05-26
Payer: COMMERCIAL

## 2022-05-26 NOTE — TELEPHONE ENCOUNTER
Patient scheduled for PKE appointments starting at 0730.  No show as of 0845. Phone call to patient with assistance of Bengali . Patient states he was unaware of appointments today. Informed patient, caller would let Coordinator know and  they would be contacted by a  to reschedule appointments. Transplant Coordinator and Scheduled notified..

## 2022-06-16 ENCOUNTER — TELEPHONE (OUTPATIENT)
Dept: TRANSPLANT | Facility: CLINIC | Age: 47
End: 2022-06-16
Payer: COMMERCIAL

## 2022-06-16 NOTE — TELEPHONE ENCOUNTER
Checked in on patient's status with the help of a Slovenian . He missed his appointments in April. He reported that he thought they were cancelled because he had a surgery that was cancelled. We reviewed transplant evaluation and he reported that he understood, and that he would like to complete the evaluation and reschedule. Note sent to scheduling to reach out and reschedule.

## 2022-06-20 DIAGNOSIS — I10 HYPERTENSION GOAL BP (BLOOD PRESSURE) < 140/90: ICD-10-CM

## 2022-06-20 DIAGNOSIS — N17.9 ACUTE RENAL FAILURE, UNSPECIFIED ACUTE RENAL FAILURE TYPE (H): ICD-10-CM

## 2022-06-20 RX ORDER — AMLODIPINE BESYLATE 5 MG/1
5 TABLET ORAL 2 TIMES DAILY
Qty: 180 TABLET | Refills: 3 | Status: SHIPPED | OUTPATIENT
Start: 2022-06-20 | End: 2022-10-13

## 2022-06-20 RX ORDER — LISINOPRIL 20 MG/1
20 TABLET ORAL 2 TIMES DAILY
Qty: 180 TABLET | Refills: 3 | Status: SHIPPED | OUTPATIENT
Start: 2022-06-20 | End: 2022-10-13

## 2022-06-23 ENCOUNTER — PATIENT OUTREACH (OUTPATIENT)
Dept: NEPHROLOGY | Facility: CLINIC | Age: 47
End: 2022-06-23

## 2022-06-23 NOTE — PROGRESS NOTES
Alma Rosa Mandel MD  P Dialysis Access Nurse; Tatiana Foster, RN  Hello everyone,     I am messaging about Mr. Bingham's dialysis access. He had a very, very complex attempt at a fistula in the right arm that was unsuccessful. He has residual weakness in the right hand. I reviewed his case with all of my partners today and it is still a bit of a mystery regarding his intra-operative findings and now persistent weakness. Everyone unanimously agreed that he should be evaluated and really try to do peritoneal dialysis to prevent the risk of damaging his other arm with another attempt at a fistula. Also, it would be beneficial to push along the transplant workup to try to get him a renal transplant with his age. Thank you.     - Alma Rosa     Sent message to Dr. Dudley, Candelaria Vo, NP and Dr. Dennis with recap of dialysis access hx the past year and Dr. Mandel's recommendation.    Recap of dialysis access hx:  6/2021 vein mapping and fistula consult with Dr. Dennis- recommendation for left brachial cephalic transposition AV fistula.    9/2021 SOT NP orders PAC.  No documentation if surgery was requested.    1/2022 nephrology NP, Candelaria Vo orders vein mapping.  Pt was referred to Vascular Surgery for fistula consult in attempts to expedite obtaining a permanent dialysis access.  Dr. Mandel recommends referring pt for percutaneous AV fistula using Ellipsys technology with IR.    2/3/22 pt has consult with Dr. Iqbal, IR.  Vein mapping indicates  vein is too small for Ellipsys pAVF, however, radiologist reviewed imaging and considered  vein measurement to be within margin of error and could be considered at least 2mm in diameter, meeting Ellipsys criteria.    2/14/22 Pt has Ellipsys procedure in IR, which was aborted d/t RUE venous US showing  vein being too small for pAVF creation and a small cephalic vein occlusive thrombosis.  LUE venous US showed  vein too small for  pAVF creation as well.  Pt referred back to Dr. Mandel.    3/8/22 Consult with Dr. Mandel.  Plan for right forearm loop graft.    4/21/22 AV graft surgery with Dr. Mandel unsuccessful:   Findings: Brachiobasilic anatomosis created, no ulnar or radial pulse afterwards. Venotomy made at level of anastomosis, no technical defect noted but patient had no backbleeding or inflow. #3 dima embolectomy catheter passed proximally with return of inflow but no removal of clot. Additional incision made in forearm at bifurcation and embolectomy of radial and ulnar arteries performed with excellent backbleeding. Element of vasospasm present but patient had signals at ulnar and radial after embolectomy (without return of clot). However on opening up outflow to basilic vein , ulnar signal disappeared, prompting fistula ligation. Palpable radial and ulnar pulse present.     Pt has since had work up r/t right hand weakness post failed AV graft surgery, and CTA of right upper extremity to assess why AV graft surgery was unsuccessful.  Dr. Mandel has also presented the case at her multidisciplinary vascular conference.      Will continue to follow pt.  Awaiting response with plan from nephrology and SOT surgeon.    DEXTER RUBI RN on 6/23/2022 at 2:31 PM  Dialysis Access Care Coordinator  Phone: [phone]  P_Dialysis_Access_Nurse

## 2022-07-06 NOTE — TELEPHONE ENCOUNTER
Called patient with intrep to reschedule his PKE eval, he still has dialysis on MWF in the PM; he confirmed for Thurs, August 25 and mailing a schedule to his home. He confirmed for everything.

## 2022-08-18 ENCOUNTER — TELEPHONE (OUTPATIENT)
Dept: TRANSPLANT | Facility: CLINIC | Age: 47
End: 2022-08-18

## 2022-08-18 NOTE — TELEPHONE ENCOUNTER
Spoke with patient with assistance of Greek  Confirmed upcoming PKE appointments at [facility] on 8/25/22 starting at 0730. Instructed patient may eat breakfast, take regularly scheduled medications and be accompanied by 1 adult visitor.

## 2022-08-25 ENCOUNTER — TELEPHONE (OUTPATIENT)
Dept: TRANSPLANT | Facility: CLINIC | Age: 47
End: 2022-08-25

## 2022-08-25 NOTE — TELEPHONE ENCOUNTER
Patient scheduled for PKE appointments today starting at 0730. No show as of 0830. Phone call to patient with assistance of Togolese . No answer, no VM. Unable to leave message. Transplant Coordinator informed.

## 2022-09-15 ENCOUNTER — DOCUMENTATION ONLY (OUTPATIENT)
Dept: TRANSPLANT | Facility: CLINIC | Age: 47
End: 2022-09-15

## 2022-09-16 ENCOUNTER — TELEPHONE (OUTPATIENT)
Dept: TRANSPLANT | Facility: CLINIC | Age: 47
End: 2022-09-16

## 2022-09-16 DIAGNOSIS — I10 ESSENTIAL HYPERTENSION: ICD-10-CM

## 2022-09-16 DIAGNOSIS — N18.5 CHRONIC KIDNEY DISEASE, STAGE V (H): ICD-10-CM

## 2022-09-16 DIAGNOSIS — Z01.818 PRE-TRANSPLANT EVALUATION FOR KIDNEY TRANSPLANT: ICD-10-CM

## 2022-09-16 NOTE — TELEPHONE ENCOUNTER
Spoke with patient's dialysis social worker. There is an in person Romansh  coming to the center today to help with assessment. They will call our scheduling team to set up PKE this afternoon. Given direct line for PKE scheduling, Gill is aware.

## 2022-10-13 DIAGNOSIS — N17.9 ACUTE RENAL FAILURE, UNSPECIFIED ACUTE RENAL FAILURE TYPE (H): ICD-10-CM

## 2022-10-13 DIAGNOSIS — I10 HYPERTENSION GOAL BP (BLOOD PRESSURE) < 140/90: ICD-10-CM

## 2022-10-13 RX ORDER — CARVEDILOL 12.5 MG/1
12.5 TABLET ORAL 2 TIMES DAILY WITH MEALS
Qty: 180 TABLET | Refills: 3 | Status: SHIPPED | OUTPATIENT
Start: 2022-10-13 | End: 2023-12-26

## 2022-10-13 RX ORDER — AMLODIPINE BESYLATE 5 MG/1
5 TABLET ORAL 2 TIMES DAILY
Qty: 180 TABLET | Refills: 3 | Status: SHIPPED | OUTPATIENT
Start: 2022-10-13 | End: 2023-11-16

## 2022-10-13 RX ORDER — LISINOPRIL 20 MG/1
20 TABLET ORAL 2 TIMES DAILY
Qty: 180 TABLET | Refills: 3 | Status: SHIPPED | OUTPATIENT
Start: 2022-10-13 | End: 2023-10-19

## 2022-12-08 ENCOUNTER — TELEPHONE (OUTPATIENT)
Dept: TRANSPLANT | Facility: CLINIC | Age: 47
End: 2022-12-08

## 2022-12-08 NOTE — TELEPHONE ENCOUNTER
Reminder phone call for PKE appointments scheduled 12/15/22 made with assistance of Botswanan .   No answer x2 attempts. No VM. Unable to leave message.

## 2022-12-15 ENCOUNTER — TELEPHONE (OUTPATIENT)
Dept: TRANSPLANT | Facility: CLINIC | Age: 47
End: 2022-12-15

## 2022-12-15 NOTE — TELEPHONE ENCOUNTER
Patient scheduled for PKE appointments starting at 0730.  No show as of 0840.  Phone call to patient with assistance of Cayman Islander . No answer, no VM, unable to leave message. Transplant Coordinator informed of no show.

## 2022-12-29 ENCOUNTER — TELEPHONE (OUTPATIENT)
Dept: TRANSPLANT | Facility: CLINIC | Age: 47
End: 2022-12-29

## 2022-12-29 NOTE — TELEPHONE ENCOUNTER
Called patient with intrep and his phone line drops.  Called patient's dialysis unit and they said that the [place] and to return end of January or early February.  I have postponed appts till mid February and will try to call him again later.

## 2023-02-20 ENCOUNTER — LAB (OUTPATIENT)
Dept: LAB | Facility: CLINIC | Age: 48
End: 2023-02-20
Payer: COMMERCIAL

## 2023-02-20 ENCOUNTER — TELEPHONE (OUTPATIENT)
Dept: NEPHROLOGY | Facility: CLINIC | Age: 48
End: 2023-02-20

## 2023-02-20 ENCOUNTER — ANCILLARY PROCEDURE (OUTPATIENT)
Dept: GENERAL RADIOLOGY | Facility: CLINIC | Age: 48
End: 2023-02-20
Attending: INTERNAL MEDICINE
Payer: COMMERCIAL

## 2023-02-20 DIAGNOSIS — R91.8 ABNORMALITY OF LUNG ON CXR: Primary | ICD-10-CM

## 2023-02-20 DIAGNOSIS — N18.6 END STAGE RENAL DISEASE (H): Primary | ICD-10-CM

## 2023-02-20 DIAGNOSIS — N18.6 END STAGE RENAL DISEASE (H): ICD-10-CM

## 2023-02-20 LAB
ALBUMIN SERPL-MCNC: 3.7 G/DL (ref 3.4–5)
ANION GAP SERPL CALCULATED.3IONS-SCNC: 10 MMOL/L (ref 3–14)
BUN SERPL-MCNC: 131 MG/DL (ref 7–30)
CALCIUM SERPL-MCNC: 8.3 MG/DL (ref 8.5–10.1)
CHLORIDE BLD-SCNC: 109 MMOL/L (ref 94–109)
CO2 SERPL-SCNC: 19 MMOL/L (ref 20–32)
CREAT SERPL-MCNC: 13.2 MG/DL (ref 0.66–1.25)
ERYTHROCYTE [DISTWIDTH] IN BLOOD BY AUTOMATED COUNT: 12.9 % (ref 10–15)
GFR SERPL CREATININE-BSD FRML MDRD: 4 ML/MIN/1.73M2
GLUCOSE BLD-MCNC: 128 MG/DL (ref 70–99)
HCT VFR BLD AUTO: 32.7 % (ref 40–53)
HGB BLD-MCNC: 11 G/DL (ref 13.3–17.7)
MCH RBC QN AUTO: 30.3 PG (ref 26.5–33)
MCHC RBC AUTO-ENTMCNC: 33.6 G/DL (ref 31.5–36.5)
MCV RBC AUTO: 90 FL (ref 78–100)
PHOSPHATE SERPL-MCNC: 7.4 MG/DL (ref 2.5–4.5)
PLATELET # BLD AUTO: 149 10E3/UL (ref 150–450)
POTASSIUM BLD-SCNC: 4.8 MMOL/L (ref 3.4–5.3)
RBC # BLD AUTO: 3.63 10E6/UL (ref 4.4–5.9)
SODIUM SERPL-SCNC: 138 MMOL/L (ref 133–144)
WBC # BLD AUTO: 6.3 10E3/UL (ref 4–11)

## 2023-02-20 PROCEDURE — 86704 HEP B CORE ANTIBODY TOTAL: CPT

## 2023-02-20 PROCEDURE — 80069 RENAL FUNCTION PANEL: CPT

## 2023-02-20 PROCEDURE — 86706 HEP B SURFACE ANTIBODY: CPT

## 2023-02-20 PROCEDURE — 36415 COLL VENOUS BLD VENIPUNCTURE: CPT

## 2023-02-20 PROCEDURE — 71046 X-RAY EXAM CHEST 2 VIEWS: CPT | Performed by: RADIOLOGY

## 2023-02-20 PROCEDURE — 87340 HEPATITIS B SURFACE AG IA: CPT

## 2023-02-20 PROCEDURE — 85027 COMPLETE CBC AUTOMATED: CPT

## 2023-02-20 NOTE — TELEPHONE ENCOUNTER
"Patient arrived to [place] dialysis unit today after an extended Holiday in [place]. Dialysis unit was not aware of his intention to begin treatment today.  was notified that patient needed to be formally admitted to the dialysis unit again.    [facility] admissions online portal initiated. Patient had lab and CXR completed today. CXR appeared abnormal and Dr. Dudley is requesting a follow up Chest CT.     Writer contacted patient. Informed him of abnormal finding. He reports feeling well and \"normal.\" He denies any shortness of breath or cough at this time. He does not have any concerns of fluid overload. He is agreeable to follow up CT and will plan to have this done tomorrow AM as scheduled.     Educated patient to avoid high potassium foods until dialysis is able to be re-initiated. Also advised that if he have any worsening of symptoms in regards to fluid retention or overload, he should seek emergent care at the [facility]. He verbalized understanding.     Clinic/Dialysis teams to provide him with further updates.     Cherie Alejandre, RN, BSN  Nephrology Care Coordinator  [facility]    "

## 2023-02-21 ENCOUNTER — ANCILLARY PROCEDURE (OUTPATIENT)
Dept: CT IMAGING | Facility: CLINIC | Age: 48
End: 2023-02-21
Attending: INTERNAL MEDICINE
Payer: COMMERCIAL

## 2023-02-21 DIAGNOSIS — R91.8 ABNORMALITY OF LUNG ON CXR: ICD-10-CM

## 2023-02-21 LAB
HBV CORE AB SERPL QL IA: NONREACTIVE
HBV SURFACE AB SERPL IA-ACNC: 3.25 M[IU]/ML
HBV SURFACE AB SERPL IA-ACNC: NONREACTIVE M[IU]/ML
HBV SURFACE AG SERPL QL IA: NONREACTIVE

## 2023-02-21 PROCEDURE — 71250 CT THORAX DX C-: CPT | Mod: GC | Performed by: RADIOLOGY

## 2023-02-26 NOTE — NURSING NOTE
Chief Complaint   Patient presents with     Follow Up     S/P faild fistula      Vitals were taken and medications were reconciled.   Sam Wayne, EMT  3:10 PM    
.

## 2023-04-12 DIAGNOSIS — R74.8 ELEVATED LIVER ENZYMES: Primary | ICD-10-CM

## 2023-04-19 ENCOUNTER — ANCILLARY PROCEDURE (OUTPATIENT)
Dept: ULTRASOUND IMAGING | Facility: CLINIC | Age: 48
End: 2023-04-19
Attending: INTERNAL MEDICINE
Payer: COMMERCIAL

## 2023-04-19 PROCEDURE — 76700 US EXAM ABDOM COMPLETE: CPT | Mod: GC | Performed by: RADIOLOGY

## 2023-06-02 ENCOUNTER — TELEPHONE (OUTPATIENT)
Dept: TRANSPLANT | Facility: CLINIC | Age: 48
End: 2023-06-02
Payer: COMMERCIAL

## 2023-06-02 NOTE — TELEPHONE ENCOUNTER
Called patient to set up PKE at the request of Dr. Dudley. Patient has cancelled or no showed 4 PKE dates last year.     History of ESRD due to malignant hypertension- no biopsy.  Pt has 2 years of dialysis wait time with ABO AB. Pt is not diabetic.     Other hx includes bilateral subdural hematoma 6/2021 s/p craniotomy- was on Keppra for seizure prevention for 6 months and followed with neurology and neurosurgery but not recently; thyroid nodule which requires biopsy but not yet completed; hepatic steatosis with hepatology consult scheduled for July.  Heart hx: no recent events, echo 2021 showed grade 2 diastolic dysfunction. He is a current smoker.     Attempted call x2 with the assistance of Barbadian . No answer and voicemail box has not been set up.     Confirmed with dialysis center that correct number is listed in chart. He is compliant with dialysis and will be in clinic later this morning. Will attempt call again when patient is in clinic.    Requesting Refill on rx Losartan/HCT 50-12.5 MG tab, first sent on 10/6.

## 2023-09-27 ENCOUNTER — APPOINTMENT (OUTPATIENT)
Dept: INTERPRETER SERVICES | Facility: CLINIC | Age: 48
End: 2023-09-27
Payer: COMMERCIAL

## 2023-09-27 DIAGNOSIS — R74.8 ELEVATED LIVER ENZYMES: Primary | ICD-10-CM

## 2023-10-09 ENCOUNTER — TRANSFERRED RECORDS (OUTPATIENT)
Dept: HEALTH INFORMATION MANAGEMENT | Facility: CLINIC | Age: 48
End: 2023-10-09
Payer: COMMERCIAL

## 2023-10-09 LAB
ALT SERPL-CCNC: 327 U/L (ref 10–49)
AST SERPL-CCNC: 97 U/L (ref 0–33)
CREATININE (EXTERNAL): 8.86 MG/DL (ref 0.7–1.3)
POTASSIUM (EXTERNAL): 4.2 MEQ/L (ref 3.5–5.5)

## 2023-10-11 ENCOUNTER — TRANSFERRED RECORDS (OUTPATIENT)
Dept: HEALTH INFORMATION MANAGEMENT | Facility: CLINIC | Age: 48
End: 2023-10-11
Payer: COMMERCIAL

## 2023-10-11 ENCOUNTER — APPOINTMENT (OUTPATIENT)
Dept: INTERPRETER SERVICES | Facility: CLINIC | Age: 48
End: 2023-10-11
Payer: COMMERCIAL

## 2023-10-11 ENCOUNTER — MEDICAL CORRESPONDENCE (OUTPATIENT)
Dept: HEALTH INFORMATION MANAGEMENT | Facility: CLINIC | Age: 48
End: 2023-10-11
Payer: COMMERCIAL

## 2023-10-12 ENCOUNTER — ANCILLARY PROCEDURE (OUTPATIENT)
Dept: ULTRASOUND IMAGING | Facility: CLINIC | Age: 48
End: 2023-10-12
Attending: INTERNAL MEDICINE
Payer: COMMERCIAL

## 2023-10-12 DIAGNOSIS — R74.8 ELEVATED LIVER ENZYMES: ICD-10-CM

## 2023-10-12 PROCEDURE — 76700 US EXAM ABDOM COMPLETE: CPT

## 2023-10-18 ENCOUNTER — TELEPHONE (OUTPATIENT)
Dept: GASTROENTEROLOGY | Facility: CLINIC | Age: 48
End: 2023-10-18
Payer: COMMERCIAL

## 2023-10-18 NOTE — TELEPHONE ENCOUNTER
RECORDS RECEIVED FROM: Micheline Dudley, DO // Elevated liver enzymes    Appt Date: 11/2/2023   NOTES STATUS DETAILS   OFFICE NOTE from referring provider N/A    OFFICE NOTES from other specialists Internal 5/17/2022 OV with Vas ALETHEA Shi  9/17/2021 OV with GARCÍA Nation   DISCHARGE SUMMARY from hospital N/A    MEDICATION LIST Internal    LIVER BIOSPY (IF APPLICABLE)      PATHOLOGY REPORTS  N/A    IMAGING     ENDOSCOPY (IF AVAILABLE) N/A    COLONOSCOPY (IF AVAILABLE) N/A    ULTRASOUND LIVER Internal 10/12/2023  4/19/2023   CT OF ABDOMEN N/A    MRI OF LIVER N/A    FIBROSCAN, US ELASTOGRAPHY, FIBROSIS SCAN, MR ELASTOGRAPHY N/A    LABS     HEPATIC PANEL (LIVER PANEL) N/A    BASIC METABOLIC PANEL Internal 4/21/2022   COMPLETE METABOLIC PANEL Internal 9/17/2021   COMPLETE BLOOD COUNT (CBC) Internal 2/20/2023, 2/14/2022   INTERNATIONAL NORMALIZED RATIO (INR) Internal 2/14/2022   HEPATITIS B SURFACE ANTIGEN Internal 2/20/2023   HEPATITIS B SURFACE ANTIBODY Internal 2/20/2023   HEPATITIS B DNA QUANT LEVEL N/A    HEPATITIS B CORE ANTIBODY Internal 2/20/2023

## 2023-10-19 DIAGNOSIS — I10 HYPERTENSION GOAL BP (BLOOD PRESSURE) < 140/90: ICD-10-CM

## 2023-10-19 DIAGNOSIS — N17.9 ACUTE RENAL FAILURE, UNSPECIFIED ACUTE RENAL FAILURE TYPE (H): ICD-10-CM

## 2023-10-19 RX ORDER — LISINOPRIL 20 MG/1
20 TABLET ORAL 2 TIMES DAILY
Qty: 180 TABLET | Refills: 3 | Status: SHIPPED | OUTPATIENT
Start: 2023-10-19

## 2023-10-27 DIAGNOSIS — R79.89 ABNORMAL LFTS: Primary | ICD-10-CM

## 2023-10-30 DIAGNOSIS — R79.89 ABNORMAL LFTS: Primary | ICD-10-CM

## 2023-10-30 NOTE — TELEPHONE ENCOUNTER
Called and spoke with pt.  Using [facility] ([phone])  Int # [id number].  Pt agreed to schedule a In Clinic appt with Dr. Dudley 12/07/2020 at 2pm.  Lab appt at 1:30pm.    Encouraged to call if any further questions or concerns.    Yenni Leavitt LPN    
Follow up with patient needed.     Last office visit instructions:    1. INcrease carvedilol to 12.5 mg twice a day. I have sent this new prescription to the pharmacy.      2. Stay on the amlodipine 5 mg twice a day     3. In person visit in the next month - 10/19 for a 60 min appt please     4. We will be in touch regarding your blood pressure readings in a few weeks to assure stable.   
Patient had no showed last visit with Dr. Dudley in October.     Please reach out to patient to schedule follow up.    Cherie Alejandre RN, BSN  Nephrology Care Coordinator  [facility]    
RD assessment warranted for: Pressure injury stage 2 or >. Chart reviewed, events noted.  Source: medical record, patient, previous RD notes.

## 2023-11-02 ENCOUNTER — PRE VISIT (OUTPATIENT)
Dept: GASTROENTEROLOGY | Facility: CLINIC | Age: 48
End: 2023-11-02

## 2023-11-15 DIAGNOSIS — I10 HYPERTENSION GOAL BP (BLOOD PRESSURE) < 140/90: ICD-10-CM

## 2023-11-16 RX ORDER — AMLODIPINE BESYLATE 5 MG/1
5 TABLET ORAL 2 TIMES DAILY
Qty: 180 TABLET | Refills: 3 | Status: SHIPPED | OUTPATIENT
Start: 2023-11-16

## 2023-11-30 ENCOUNTER — TELEPHONE (OUTPATIENT)
Dept: GASTROENTEROLOGY | Facility: CLINIC | Age: 48
End: 2023-11-30

## 2023-12-22 ENCOUNTER — PATIENT OUTREACH (OUTPATIENT)
Dept: CARE COORDINATION | Facility: CLINIC | Age: 48
End: 2023-12-22
Payer: COMMERCIAL

## 2023-12-22 ENCOUNTER — APPOINTMENT (OUTPATIENT)
Dept: INTERPRETER SERVICES | Facility: CLINIC | Age: 48
End: 2023-12-22
Payer: COMMERCIAL

## 2023-12-22 NOTE — PROGRESS NOTES
Clinic Care Coordination Contact  Program:  [facility]: [place]   Renewal: [employer]  Date Applied:     RENEA Outreach:   12/22/23: CTA called to see if patient needed assistance with their [employer] Renewal. Patient declined needing assistance and no follow up needed   CTA e-mail application to mike Tyler  Care   [facility]  Clinic Care Coordination  [phone]    Health Insurance:      Referral/Screening:

## 2023-12-26 DIAGNOSIS — I10 HYPERTENSION GOAL BP (BLOOD PRESSURE) < 140/90: ICD-10-CM

## 2023-12-26 DIAGNOSIS — N17.9 ACUTE RENAL FAILURE, UNSPECIFIED ACUTE RENAL FAILURE TYPE (H): ICD-10-CM

## 2023-12-26 RX ORDER — CARVEDILOL 12.5 MG/1
12.5 TABLET ORAL 2 TIMES DAILY WITH MEALS
Qty: 180 TABLET | Refills: 0 | Status: SHIPPED | OUTPATIENT
Start: 2023-12-26 | End: 2024-04-11

## 2024-01-08 ENCOUNTER — TRANSFERRED RECORDS (OUTPATIENT)
Dept: HEALTH INFORMATION MANAGEMENT | Facility: CLINIC | Age: 49
End: 2024-01-08
Payer: COMMERCIAL

## 2024-01-08 LAB
ALT SERPL-CCNC: 294 U/L (ref 10–49)
AST SERPL-CCNC: 114 U/L (ref 0–33)

## 2024-01-24 ENCOUNTER — TELEPHONE (OUTPATIENT)
Dept: GASTROENTEROLOGY | Facility: CLINIC | Age: 49
End: 2024-01-24
Payer: COMMERCIAL

## 2024-01-24 DIAGNOSIS — R74.8 ELEVATED LIVER ENZYMES: Primary | ICD-10-CM

## 2024-01-24 DIAGNOSIS — K83.8 DILATION OF COMMON BILE DUCT: ICD-10-CM

## 2024-01-24 DIAGNOSIS — B18.2 CHRONIC HEPATITIS C VIRUS INFECTION (H): Primary | ICD-10-CM

## 2024-01-24 NOTE — TELEPHONE ENCOUNTER
Spoke with [facility] staff explaining several labs results were needed (Hep B/C serologies, ALT/AST/Alk Phos trends).    HBs antibody on 3/29/23 = equal or greater than 10  HBc antibody 2/22/23 = negative  HCV antibody 1/8/24 = positive   RNA quant 5/5/23 = 3.21 log (positive)   HBs antigen 6/14/23 = negative     [facility] administrative staff will fax over labs today.    MICHELLE MooneyN, RN, PHN  Hepatology Clinic  Clinics & Surgery Center  [facility]

## 2024-01-29 NOTE — TELEPHONE ENCOUNTER
Called Omar again as labs were not received via fax. Spoke with Amber. Amber will fax necessary labs.    MICHELLE MooneyN, RN, PHN  Hepatology Clinic  Clinics & Surgery Center  [facility]

## 2024-02-21 ENCOUNTER — PATIENT OUTREACH (OUTPATIENT)
Dept: NEPHROLOGY | Facility: CLINIC | Age: 49
End: 2024-02-21

## 2024-02-21 DIAGNOSIS — Z99.2 ESRD ON HEMODIALYSIS (H): ICD-10-CM

## 2024-02-21 DIAGNOSIS — N18.5 CKD (CHRONIC KIDNEY DISEASE) STAGE 5, GFR LESS THAN 15 ML/MIN (H): Primary | ICD-10-CM

## 2024-02-21 DIAGNOSIS — N18.6 ESRD ON HEMODIALYSIS (H): ICD-10-CM

## 2024-02-21 NOTE — PROGRESS NOTES
Received the following message from Dr. Dudley:  Appreciate your help with this dialysis patient as able:   - transplant referral (needs Swedish )   - 2nd opinion for vascular access - would want him seen by a vascular surgeon.    SOT referral placed.  Message sent to Dialysis access team regarding appointment request.

## 2024-02-22 DIAGNOSIS — B18.2 CHRONIC HEPATITIS C VIRUS INFECTION (H): Primary | ICD-10-CM

## 2024-02-22 DIAGNOSIS — R79.89 ABNORMAL LFTS: ICD-10-CM

## 2024-02-26 ENCOUNTER — TELEPHONE (OUTPATIENT)
Dept: GASTROENTEROLOGY | Facility: CLINIC | Age: 49
End: 2024-02-26

## 2024-02-26 DIAGNOSIS — N18.6 ESRD (END STAGE RENAL DISEASE) ON DIALYSIS (H): Primary | ICD-10-CM

## 2024-02-26 DIAGNOSIS — N18.6 ESRD ON HEMODIALYSIS (H): Primary | ICD-10-CM

## 2024-02-26 DIAGNOSIS — Z99.2 ESRD ON HEMODIALYSIS (H): Primary | ICD-10-CM

## 2024-02-26 DIAGNOSIS — Z99.2 ESRD (END STAGE RENAL DISEASE) ON DIALYSIS (H): Primary | ICD-10-CM

## 2024-02-26 DIAGNOSIS — T82.898A PROBLEM WITH DIALYSIS ACCESS, INITIAL ENCOUNTER (H): ICD-10-CM

## 2024-02-26 NOTE — PROGRESS NOTES
Ordered Vascular Surgery referral per Dr. Dudley to assess if patient is a candidate for a new dialysis access. He had a fistula created by Dr. Mandel previously, which was unsuccessful due to intra-op complications. Patient had deficits in his hand after surgery, which has now improved.    Gill Perez RN on 2/26/2024 at 2:54 PM

## 2024-02-29 ENCOUNTER — OFFICE VISIT (OUTPATIENT)
Dept: GASTROENTEROLOGY | Facility: CLINIC | Age: 49
End: 2024-02-29
Payer: COMMERCIAL

## 2024-02-29 ENCOUNTER — LAB (OUTPATIENT)
Dept: LAB | Facility: CLINIC | Age: 49
End: 2024-02-29
Payer: COMMERCIAL

## 2024-02-29 VITALS
OXYGEN SATURATION: 100 % | SYSTOLIC BLOOD PRESSURE: 131 MMHG | HEART RATE: 60 BPM | DIASTOLIC BLOOD PRESSURE: 88 MMHG | WEIGHT: 114.3 LBS | HEIGHT: 66 IN | BODY MASS INDEX: 18.37 KG/M2

## 2024-02-29 DIAGNOSIS — R79.89 ABNORMAL LFTS: ICD-10-CM

## 2024-02-29 DIAGNOSIS — R76.8 HEPATITIS C ANTIBODY POSITIVE IN BLOOD: ICD-10-CM

## 2024-02-29 DIAGNOSIS — R79.89 ABNORMAL LFTS: Primary | ICD-10-CM

## 2024-02-29 DIAGNOSIS — B18.2 CHRONIC HEPATITIS C VIRUS INFECTION (H): ICD-10-CM

## 2024-02-29 DIAGNOSIS — K83.8 COMMON BILE DUCT DILATATION: ICD-10-CM

## 2024-02-29 PROBLEM — J15.9 PNEUMONIA, BACTERIAL: Status: ACTIVE | Noted: 2021-05-09

## 2024-02-29 PROBLEM — I62.9 INTRACRANIAL HEMORRHAGE (H): Status: ACTIVE | Noted: 2021-06-29

## 2024-02-29 PROBLEM — S06.5XAA SUBDURAL HEMATOMA (H): Status: ACTIVE | Noted: 2021-07-14

## 2024-02-29 PROBLEM — N18.6 ESRD (END STAGE RENAL DISEASE) ON DIALYSIS (H): Status: ACTIVE | Noted: 2024-02-29

## 2024-02-29 PROBLEM — Z99.2 ESRD (END STAGE RENAL DISEASE) ON DIALYSIS (H): Status: ACTIVE | Noted: 2024-02-29

## 2024-02-29 PROBLEM — I10 HYPERTENSION, UNSPECIFIED TYPE: Status: ACTIVE | Noted: 2020-09-16

## 2024-02-29 LAB
ALBUMIN SERPL BCG-MCNC: 4.6 G/DL (ref 3.5–5.2)
ALP SERPL-CCNC: 131 U/L (ref 40–150)
ALT SERPL W P-5'-P-CCNC: 107 U/L (ref 0–70)
ANION GAP SERPL CALCULATED.3IONS-SCNC: 11 MMOL/L (ref 7–15)
AST SERPL W P-5'-P-CCNC: 26 U/L (ref 0–45)
BILIRUB DIRECT SERPL-MCNC: <0.2 MG/DL (ref 0–0.3)
BILIRUB SERPL-MCNC: 0.5 MG/DL
BUN SERPL-MCNC: 53.7 MG/DL (ref 6–20)
CALCIUM SERPL-MCNC: 9.3 MG/DL (ref 8.6–10)
CHLORIDE SERPL-SCNC: 104 MMOL/L (ref 98–107)
CREAT SERPL-MCNC: 8.77 MG/DL (ref 0.67–1.17)
DEPRECATED HCO3 PLAS-SCNC: 26 MMOL/L (ref 22–29)
EGFRCR SERPLBLD CKD-EPI 2021: 7 ML/MIN/1.73M2
ERYTHROCYTE [DISTWIDTH] IN BLOOD BY AUTOMATED COUNT: 13.4 % (ref 10–15)
GLUCOSE SERPL-MCNC: 93 MG/DL (ref 70–99)
HCT VFR BLD AUTO: 38.7 % (ref 40–53)
HGB BLD-MCNC: 12.4 G/DL (ref 13.3–17.7)
INR PPP: 0.94 (ref 0.85–1.15)
MCH RBC QN AUTO: 29.3 PG (ref 26.5–33)
MCHC RBC AUTO-ENTMCNC: 32 G/DL (ref 31.5–36.5)
MCV RBC AUTO: 92 FL (ref 78–100)
PLATELET # BLD AUTO: 209 10E3/UL (ref 150–450)
POTASSIUM SERPL-SCNC: 4.8 MMOL/L (ref 3.4–5.3)
PROT SERPL-MCNC: 8.3 G/DL (ref 6.4–8.3)
RBC # BLD AUTO: 4.23 10E6/UL (ref 4.4–5.9)
SODIUM SERPL-SCNC: 141 MMOL/L (ref 135–145)
WBC # BLD AUTO: 9.1 10E3/UL (ref 4–11)

## 2024-02-29 PROCEDURE — 87902 NFCT AGT GNTYP ALYS HEP C: CPT | Mod: 90

## 2024-02-29 PROCEDURE — 99204 OFFICE O/P NEW MOD 45 MIN: CPT | Performed by: PHYSICIAN ASSISTANT

## 2024-02-29 PROCEDURE — 87522 HEPATITIS C REVRS TRNSCRPJ: CPT

## 2024-02-29 PROCEDURE — 82784 ASSAY IGA/IGD/IGG/IGM EACH: CPT

## 2024-02-29 PROCEDURE — 83516 IMMUNOASSAY NONANTIBODY: CPT | Mod: 90

## 2024-02-29 PROCEDURE — 86381 MITOCHONDRIAL ANTIBODY EACH: CPT

## 2024-02-29 PROCEDURE — 86704 HEP B CORE ANTIBODY TOTAL: CPT

## 2024-02-29 PROCEDURE — 87340 HEPATITIS B SURFACE AG IA: CPT

## 2024-02-29 PROCEDURE — 85610 PROTHROMBIN TIME: CPT

## 2024-02-29 PROCEDURE — 86038 ANTINUCLEAR ANTIBODIES: CPT

## 2024-02-29 PROCEDURE — 85027 COMPLETE CBC AUTOMATED: CPT

## 2024-02-29 PROCEDURE — 82248 BILIRUBIN DIRECT: CPT

## 2024-02-29 PROCEDURE — 86364 TISS TRNSGLTMNASE EA IG CLAS: CPT

## 2024-02-29 PROCEDURE — 80053 COMPREHEN METABOLIC PANEL: CPT

## 2024-02-29 PROCEDURE — 36415 COLL VENOUS BLD VENIPUNCTURE: CPT

## 2024-02-29 PROCEDURE — 99000 SPECIMEN HANDLING OFFICE-LAB: CPT

## 2024-02-29 PROCEDURE — 86706 HEP B SURFACE ANTIBODY: CPT

## 2024-02-29 NOTE — LETTER
2/29/2024         RE: Johnny Bingham  [address]  [facility] [address]        Dear Colleague,    Thank you for referring your patient, Johnny Bingham, to the [facility]. Please see a copy of my visit note below.    Hepatology Clinic note  Johnny Bingham   Date of Birth 1975    REASON FOR CONSULTATION: Concerns of hep C, elevated LFTs  REFERRING PROVIDER: Micheline Dudley,          Assessment/plan:   Johnny Bingham is a 48 year old male with PMHx significant for end-stage renal disease on dialysis 3 times per week, secondary renal hyperparathyroidism, anemia due to CKD, subdural hematoma, hypertension and tobacco use disorder who presents today for consult regarding elevated LFTs, CBD dilation on imaging and concerns of biopsy.    Never diagnosed with liver disease/condition in the past. States he has never been told he has hepatitis C before. Denies known family history of liver disease. Denies hx of liver biopsy. Denies hx of IV drug use. No alcohol for the past 3-4 yrs.  Occasional alcohol use prior to that.  Stopped drinking due to hx of HTN and stroke. No hx of abdominal surgeries.  Denies current use of any vitamins or supplements.  Besides medications on his current medication list, does admit to taking Tums before eating for the past week.  States kidney doctor recommended doing this.    Concerns of hepatitis C   Elevated LFTs  CBD dilation   -HCV antibody: positive April and May 2023 on outside lab work  -HCV RNA: positive April and May 2023 on outside lab work   -HBV Sab: nonreactive Feb 2023  -HBV Sag: nonreactive Feb 2023  -HBV Cab total: nonreactive Feb 2023  -Hep c genotype: pending   -Diagnosed: pt was never told in the past he had Hep C  -History: no hx of tattoos, IVDU, or time in long term  -Prior biopsy: none  -Prior treatments: none  -Outside labs 1/8/24:  and     US abd complete 10/12/23: Liver craniocaudal dimension of 13.7 cm. No focal  liver lesion. Liver echogenicity normal. Spleen 10.3 cm. Gallbladder normal. No gallbladder wall thickening or pericholecystic fluid. No sonographic Mendoza's sign elicited. CBD 8mm. On  US from April 2023, CBD 4 mm.     Lab today: PLT and INR WNL (PLT noted to be very minimally below normal range Feb 2023)    LFTs noted to be WNL Sept 2021.     No hx of fibroscan per chart review.     Elevated LFTs certainly could be 2/2 ongoing, active hepatitis C.  In combination with recent mention of CBD dilation, pending labs such as F-actin, WAN and AMA.  Not overly concerned at this time for biliary stones or obstruction since patient is asymptomatic.     - Pending labs from today  - Reviewed recent outside labs  - Reviewed most recent US abd  - Ordered fibroscan  - Scheduled for MRCP 3/23  - Continue to monitor and trend hepatic panel  - Continue to avoid alcohol  - Avoid starting and new vitamins or supplements at this time  - Okay to continue on currents meds as prescribed  -Provided pt education:   > There is no vaccine to prevent the disease.   Don't share needles to inject drugs.  Make sure all tools and supplies are sterilized if you get a tattoo or body piercing, or have acupuncture.  Don't share anything that might have infected blood on it. This may include a toothbrush, razor, or nail clippers.  Use latex condoms during sex if you have HIV. Also use latex condoms if you have multiple sex partners or a sexually transmitted infection.  - Follow-up in clinic in ~1 month with fibroscan SAME DAY   > Pending Hep C genotype and degree of fibrosis on fibroscan, will likely tx Hep C with Mavyret PO for 8 weeks    Pema Andrea PA-C  [facility]   -----------------------------------------------------         HPI:     Patient presents today to clinic for consult.    Never told he had liver disease/condition in the past.  Does not recall ever being diagnosed with hepatitis C.  No known family  history of liver disease.  No history of liver biopsy.  Denies history of IV drug use.  No history of being in [facility].  No history of tattoos.  Denies history of abdominal surgeries.  Currently is taking free prescription medications.  Also states he has been taking Tums before eating over the past week as recommended by kidney doctor.  Denies taking any vitamins or supplements.  Does dialysis 3 days a week.  No recent chest pain or shortness of breath.  No lower extremity edema.  No yellowing of eyes.  No confusion.  No bright red blood per rectum or melena.  No fevers or chills.  States he had some nausea about 3-4 weeks ago when his stomach was empty.  No recent vomiting.  States weight has been stable.  Reports a normal appetite.    Has never had a colonoscopy.    Denies having a PCP at this time.    No alcohol use for the past 3-4 years.  States he stopped drinking alcohol because of his history of stroke and high blood pressure.  Denies history of alcohol abuse.  Admits to current tobacco use.  No history of IV drug use.  No current illegal drug use.    PMH:    has a past medical history of Cerebral infarction (H) (06/2021), CKD (chronic kidney disease) stage 5, GFR less than 15 ml/min (H), History of anemia due to CKD (4/15/2022), Hypertension, and Tobacco use disorder (4/14/2022).     SMH:    has a past surgical history that includes no history of surgery; IR CVC Tunnel Placement > 5 Yrs of Age (5/10/2021); IR CVC Tunnel Revision Right (10/6/2021); IR Follow Up Visit Outpatient (2/14/2022); and Create fistula arteriovenous upper extremity (Right, 4/21/2022).     Medications:   Current Outpatient Medications   Medication     amLODIPine (NORVASC) 5 MG tablet     carvedilol (COREG) 12.5 MG tablet     lisinopril (ZESTRIL) 20 MG tablet     oxyCODONE (ROXICODONE) 5 MG tablet     No current facility-administered medications for this visit.     Previous work-up:   HCV antibody: positive April and May 2023 on outside  lab work  HCV RNA: positive April and May 2023 on outside lab work   HBV Sab: nonreactive Feb 2023  HBV Sag: nonreactive Feb 2023  HBV Cab total: nonreactive Feb 2023    WAN: in process   F-actin: in process   AMA: in process  TTG: in process  IgA: in process   IgM: in process     Iron panel   Ferritin   Iron Sats:   HAV Ab IgG  HAV Ab IgM   HIV   Alpha-1-antitrypsin  Ceruloplasmin   TSH   Cholesterol Total   HDL  LDL  Triglycerides  Hemoglobin A1c    Recent Labs   Lab Test 09/17/21  0848 07/30/21  0800 05/12/21  0607 05/10/21  0527 09/16/20  1441   ALKPHOS 42 68 100 106 46   ALT 20 15 34 47 49   AST 18 11 15 12 26   BILITOTAL 0.6 0.2 0.3 0.3 0.2           Allergies:   No Known Allergies         Social History:     Social History     Socioeconomic History     Marital status: Single     Spouse name: Not on file     Number of children: Not on file     Years of education: Not on file     Highest education level: Not on file   Occupational History     Not on file   Tobacco Use     Smoking status: Every Day     Packs/day: .5     Types: Cigarettes     Start date: 1995     Smokeless tobacco: Never   Vaping Use     Vaping Use: Never used   Substance and Sexual Activity     Alcohol use: Not Currently     Drug use: Never     Sexual activity: Not Currently   Other Topics Concern     Parent/sibling w/ CABG, MI or angioplasty before 65F 55M? Not Asked   Social History Narrative     Not on file     Social Determinants of Health     Financial Resource Strain: Not on file   Food Insecurity: Not on file   Transportation Needs: Not on file   Physical Activity: Not on file   Stress: Not on file   Social Connections: Not on file   Interpersonal Safety: Not on file   Housing Stability: Not on file          Family History:     Family History   Problem Relation Age of Onset     Heart Disease Mother      Kidney Disease Father      Heart Disease Father      Diabetes Brother      Colon Cancer No family hx of      Prostate Cancer No family hx  "of           Review of Systems:   Gen: See HPI          Physical Exam:   Vital signs:      BP: 131/88 Pulse: 60     SpO2: 100 %     Height: 168 cm (5' 6.14\") Weight: 51.8 kg (114 lb 4.8 oz)  Estimated body mass index is 18.37 kg/m  as calculated from the following:    Height as of this encounter: 1.68 m (5' 6.14\").    Weight as of this encounter: 51.8 kg (114 lb 4.8 oz).  Gen: A&Ox3, NAD  HEENT: Sclera anicteric   CV: RRR, no overt murmurs  Lung: CTA posteriorly, no wheezing or crackles.   Abd: soft, mild TTP in epigastric region, ND, no palpable splenomegaly, liver is not palpable.   Ext: no edema, intact pulses.   Skin: No rash or jaundice  Neuro: grossly intact  Psych: appropriate mood and affects         Data:   Reviewed in person and significant for:    Lab Results   Component Value Date     02/20/2023     05/17/2021      Lab Results   Component Value Date    POTASSIUM 4.8 02/20/2023    POTASSIUM 3.5 05/17/2021     Lab Results   Component Value Date    CHLORIDE 109 02/20/2023    CHLORIDE 104 05/17/2021     Lab Results   Component Value Date    CO2 19 02/20/2023    CO2 28 05/17/2021     Lab Results   Component Value Date     02/20/2023    BUN 32 05/17/2021     Lab Results   Component Value Date    CR 13.20 02/20/2023    CR 4.71 05/17/2021       Lab Results   Component Value Date    WBC 6.3 02/20/2023    WBC 8.1 05/13/2021     Lab Results   Component Value Date    HGB 11.0 02/20/2023    HGB 8.1 05/17/2021     Lab Results   Component Value Date    HCT 32.7 02/20/2023    HCT 24.3 05/13/2021     Lab Results   Component Value Date    MCV 90 02/20/2023    MCV 89 05/13/2021     Lab Results   Component Value Date     02/20/2023     05/13/2021       Lab Results   Component Value Date    AST 18 09/17/2021    AST 15 05/12/2021     Lab Results   Component Value Date    ALT 20 09/17/2021    ALT 34 05/12/2021     No results found for: \"BILICONJ\"   Lab Results   Component Value Date    " BILITOTAL 0.6 09/17/2021    BILITOTAL 0.3 05/12/2021       Lab Results   Component Value Date    ALBUMIN 3.7 02/20/2023    ALBUMIN 3.0 05/17/2021     Lab Results   Component Value Date    PROTTOTAL 8.0 09/17/2021    PROTTOTAL 7.0 05/12/2021      Lab Results   Component Value Date    ALKPHOS 42 09/17/2021    ALKPHOS 100 05/12/2021       Lab Results   Component Value Date    INR 0.93 02/14/2022    INR 1.02 05/10/2021         Imaging:      Abdominal ultrasound 10/12/23     Comparisons: 4/19/2023     HISTORY:    End-stage renal disease. Elevated liver enzymes.     FINDINGS:    The liver measures a craniocaudal dimension of 13.7 cm.  No focal liver lesion. Liver echogenicity is normal. The spleen  measures 10.3 cm. The gallbladder is normal. There is no gallbladder  wall thickening or pericholecystic fluid. No sonographic Mendoza's sign  was elicited.  The diameter of the common bile duct measures 8mm. On  ultrasound from April 2023, the common bile duct measured 4 mm. The  pancreas is partially obscured but otherwise appears unremarkable. The  aorta and IVC are visualized. The right and left kidneys measure 7.0cm  and 8.0 cm , respectively. No solid renal mass, stone or  hydronephrosis. Cortical echogenicity is increased and there is  cortical thinning. There is a benign-appearing cyst in the inferior  pole of the left kidney measuring up to 9 mm.                                                            IMPRESSION:      1. New mild dilation of the common bile duct since April 2023. MRCP  should be considered.  2. Small echogenic kidneys with known end-stage renal disease.     REAL PERSON MD                      Again, thank you for allowing me to participate in the care of your patient.        Sincerely,        Pema Andrea PA-C

## 2024-02-29 NOTE — PROGRESS NOTES
Hepatology Clinic note  Johnny Bingham   Date of Birth 1975    REASON FOR CONSULTATION: Concerns of hep C, elevated LFTs  REFERRING PROVIDER: Micheline Dudley DO         Assessment/plan:   Johnny Bingham is a 48 year old male with PMHx significant for end-stage renal disease on dialysis 3 times per week, secondary renal hyperparathyroidism, anemia due to CKD, subdural hematoma, hypertension and tobacco use disorder who presents today for consult regarding elevated LFTs, CBD dilation on imaging and concerns of biopsy.    Never diagnosed with liver disease/condition in the past. States he has never been told he has hepatitis C before. Denies known family history of liver disease. Denies hx of liver biopsy. Denies hx of IV drug use. No alcohol for the past 3-4 yrs.  Occasional alcohol use prior to that.  Stopped drinking due to hx of HTN and stroke. No hx of abdominal surgeries.  Denies current use of any vitamins or supplements.  Besides medications on his current medication list, does admit to taking Tums before eating for the past week.  States kidney doctor recommended doing this.    Concerns of hepatitis C   Elevated LFTs  CBD dilation   -HCV antibody: positive April and May 2023 on outside lab work  -HCV RNA: positive April and May 2023 on outside lab work   -HBV Sab: nonreactive Feb 2023  -HBV Sag: nonreactive Feb 2023  -HBV Cab total: nonreactive Feb 2023  -Hep c genotype: pending   -Diagnosed: pt was never told in the past he had Hep C  -History: no hx of tattoos, IVDU, or time in [facility]  -Prior biopsy: none  -Prior treatments: none  -Outside labs 1/8/24:  and     US abd complete 10/12/23: Liver craniocaudal dimension of 13.7 cm. No focal liver lesion. Liver echogenicity normal. Spleen 10.3 cm. Gallbladder normal. No gallbladder wall thickening or pericholecystic fluid. No sonographic Mendoza's sign elicited. CBD 8mm. On  US from April 2023, CBD 4 mm.     Lab today: PLT and INR WNL  (PLT noted to be very minimally below normal range Feb 2023)    LFTs noted to be WNL Sept 2021.     No hx of fibroscan per chart review.     Elevated LFTs certainly could be 2/2 ongoing, active hepatitis C.  In combination with recent mention of CBD dilation, pending labs such as F-actin, WAN and AMA.  Not overly concerned at this time for biliary stones or obstruction since patient is asymptomatic.     - Pending labs from today  - Reviewed recent outside labs  - Reviewed most recent US abd  - Ordered fibroscan  - Scheduled for MRCP 3/23  - Continue to monitor and trend hepatic panel  - Continue to avoid alcohol  - Avoid starting and new vitamins or supplements at this time  - Okay to continue on currents meds as prescribed  -Provided pt education:   > There is no vaccine to prevent the disease.   Don't share needles to inject drugs.  Make sure all tools and supplies are sterilized if you get a tattoo or body piercing, or have acupuncture.  Don't share anything that might have infected blood on it. This may include a toothbrush, razor, or nail clippers.  Use latex condoms during sex if you have HIV. Also use latex condoms if you have multiple sex partners or a sexually transmitted infection.  - Follow-up in clinic in ~1 month with fibroscan SAME DAY   > Pending Hep C genotype and degree of fibrosis on fibroscan, will likely tx Hep C with Mavyret PO for 8 weeks    Pema Andrea PA-C  [facility]   -----------------------------------------------------         HPI:     Patient presents today to clinic for consult.    Never told he had liver disease/condition in the past.  Does not recall ever being diagnosed with hepatitis C.  No known family history of liver disease.  No history of liver biopsy.  Denies history of IV drug use.  No history of being in [facility].  No history of tattoos.  Denies history of abdominal surgeries.  Currently is taking free prescription medications.  Also states he  has been taking Tums before eating over the past week as recommended by kidney doctor.  Denies taking any vitamins or supplements.  Does dialysis 3 days a week.  No recent chest pain or shortness of breath.  No lower extremity edema.  No yellowing of eyes.  No confusion.  No bright red blood per rectum or melena.  No fevers or chills.  States he had some nausea about 3-4 weeks ago when his stomach was empty.  No recent vomiting.  States weight has been stable.  Reports a normal appetite.    Has never had a colonoscopy.    Denies having a PCP at this time.    No alcohol use for the past 3-4 years.  States he stopped drinking alcohol because of his history of stroke and high blood pressure.  Denies history of alcohol abuse.  Admits to current tobacco use.  No history of IV drug use.  No current illegal drug use.    PMH:    has a past medical history of Cerebral infarction (H) (06/2021), CKD (chronic kidney disease) stage 5, GFR less than 15 ml/min (H), History of anemia due to CKD (4/15/2022), Hypertension, and Tobacco use disorder (4/14/2022).     SMH:    has a past surgical history that includes no history of surgery; IR CVC Tunnel Placement > 5 Yrs of Age (5/10/2021); IR CVC Tunnel Revision Right (10/6/2021); IR Follow Up Visit Outpatient (2/14/2022); and Create fistula arteriovenous upper extremity (Right, 4/21/2022).     Medications:   Current Outpatient Medications   Medication    amLODIPine (NORVASC) 5 MG tablet    carvedilol (COREG) 12.5 MG tablet    lisinopril (ZESTRIL) 20 MG tablet    oxyCODONE (ROXICODONE) 5 MG tablet     No current facility-administered medications for this visit.     Previous work-up:   HCV antibody: positive April and May 2023 on outside lab work  HCV RNA: positive April and May 2023 on outside lab work   HBV Sab: nonreactive Feb 2023  HBV Sag: nonreactive Feb 2023  HBV Cab total: nonreactive Feb 2023    WAN: in process   F-actin: in process   AMA: in process  TTG: in process  IgA: in  "process   IgM: in process     Iron panel   Ferritin   Iron Sats:   HAV Ab IgG  HAV Ab IgM   HIV   Alpha-1-antitrypsin  Ceruloplasmin   TSH   Cholesterol Total   HDL  LDL  Triglycerides  Hemoglobin A1c    Recent Labs   Lab Test 09/17/21  0848 07/30/21  0800 05/12/21  0607 05/10/21  0527 09/16/20  1441   ALKPHOS 42 68 100 106 46   ALT 20 15 34 47 49   AST 18 11 15 12 26   BILITOTAL 0.6 0.2 0.3 0.3 0.2           Allergies:   No Known Allergies         Social History:     Social History     Socioeconomic History    Marital status: Single     Spouse name: Not on file    Number of children: Not on file    Years of education: Not on file    Highest education level: Not on file   Occupational History    Not on file   Tobacco Use    Smoking status: Every Day     Packs/day: .5     Types: Cigarettes     Start date: 1995    Smokeless tobacco: Never   Vaping Use    Vaping Use: Never used   Substance and Sexual Activity    Alcohol use: Not Currently    Drug use: Never    Sexual activity: Not Currently   Other Topics Concern    Parent/sibling w/ CABG, MI or angioplasty before 65F 55M? Not Asked   Social History Narrative    Not on file     Social Determinants of Health     Financial Resource Strain: Not on file   Food Insecurity: Not on file   Transportation Needs: Not on file   Physical Activity: Not on file   Stress: Not on file   Social Connections: Not on file   Interpersonal Safety: Not on file   Housing Stability: Not on file          Family History:     Family History   Problem Relation Age of Onset    Heart Disease Mother     Kidney Disease Father     Heart Disease Father     Diabetes Brother     Colon Cancer No family hx of     Prostate Cancer No family hx of           Review of Systems:   Gen: See HPI          Physical Exam:   Vital signs:      BP: 131/88 Pulse: 60     SpO2: 100 %     Height: 168 cm (5' 6.14\") Weight: 51.8 kg (114 lb 4.8 oz)  Estimated body mass index is 18.37 kg/m  as calculated from the following:    " "Height as of this encounter: 1.68 m (5' 6.14\").    Weight as of this encounter: 51.8 kg (114 lb 4.8 oz).  Gen: A&Ox3, NAD  HEENT: Sclera anicteric   CV: RRR, no overt murmurs  Lung: CTA posteriorly, no wheezing or crackles.   Abd: soft, mild TTP in epigastric region, ND, no palpable splenomegaly, liver is not palpable.   Ext: no edema, intact pulses.   Skin: No rash or jaundice  Neuro: grossly intact  Psych: appropriate mood and affects         Data:   Reviewed in person and significant for:    Lab Results   Component Value Date     02/20/2023     05/17/2021      Lab Results   Component Value Date    POTASSIUM 4.8 02/20/2023    POTASSIUM 3.5 05/17/2021     Lab Results   Component Value Date    CHLORIDE 109 02/20/2023    CHLORIDE 104 05/17/2021     Lab Results   Component Value Date    CO2 19 02/20/2023    CO2 28 05/17/2021     Lab Results   Component Value Date     02/20/2023    BUN 32 05/17/2021     Lab Results   Component Value Date    CR 13.20 02/20/2023    CR 4.71 05/17/2021       Lab Results   Component Value Date    WBC 6.3 02/20/2023    WBC 8.1 05/13/2021     Lab Results   Component Value Date    HGB 11.0 02/20/2023    HGB 8.1 05/17/2021     Lab Results   Component Value Date    HCT 32.7 02/20/2023    HCT 24.3 05/13/2021     Lab Results   Component Value Date    MCV 90 02/20/2023    MCV 89 05/13/2021     Lab Results   Component Value Date     02/20/2023     05/13/2021       Lab Results   Component Value Date    AST 18 09/17/2021    AST 15 05/12/2021     Lab Results   Component Value Date    ALT 20 09/17/2021    ALT 34 05/12/2021     No results found for: \"BILICONJ\"   Lab Results   Component Value Date    BILITOTAL 0.6 09/17/2021    BILITOTAL 0.3 05/12/2021       Lab Results   Component Value Date    ALBUMIN 3.7 02/20/2023    ALBUMIN 3.0 05/17/2021     Lab Results   Component Value Date    PROTTOTAL 8.0 09/17/2021    PROTTOTAL 7.0 05/12/2021      Lab Results   Component Value " Date    ALKPHOS 42 09/17/2021    ALKPHOS 100 05/12/2021       Lab Results   Component Value Date    INR 0.93 02/14/2022    INR 1.02 05/10/2021         Imaging:      Abdominal ultrasound 10/12/23     Comparisons: 4/19/2023     HISTORY:    End-stage renal disease. Elevated liver enzymes.     FINDINGS:    The liver measures a craniocaudal dimension of 13.7 cm.  No focal liver lesion. Liver echogenicity is normal. The spleen  measures 10.3 cm. The gallbladder is normal. There is no gallbladder  wall thickening or pericholecystic fluid. No sonographic Mendoza's sign  was elicited.  The diameter of the common bile duct measures 8mm. On  ultrasound from April 2023, the common bile duct measured 4 mm. The  pancreas is partially obscured but otherwise appears unremarkable. The  aorta and IVC are visualized. The right and left kidneys measure 7.0cm  and 8.0 cm , respectively. No solid renal mass, stone or  hydronephrosis. Cortical echogenicity is increased and there is  cortical thinning. There is a benign-appearing cyst in the inferior  pole of the left kidney measuring up to 9 mm.                                                            IMPRESSION:      1. New mild dilation of the common bile duct since April 2023. MRCP  should be considered.  2. Small echogenic kidneys with known end-stage renal disease.     REAL PERSON MD

## 2024-02-29 NOTE — NURSING NOTE
"Chief Complaint   Patient presents with    New Patient     New consult for elevated liver enzymes.     Vitals:    02/29/24 0828   BP: 131/88   BP Location: Left arm   Patient Position: Sitting   Cuff Size: Adult Regular   Pulse: 60   SpO2: 100%   Weight: 51.8 kg (114 lb 4.8 oz)   Height: 1.68 m (5' 6.14\")     Body mass index is 18.37 kg/m .    Medications were reconciled.        Sarah Delarosa CMA    "

## 2024-03-01 ENCOUNTER — APPOINTMENT (OUTPATIENT)
Dept: INTERPRETER SERVICES | Facility: CLINIC | Age: 49
End: 2024-03-01

## 2024-03-01 ENCOUNTER — TELEPHONE (OUTPATIENT)
Dept: GASTROENTEROLOGY | Facility: CLINIC | Age: 49
End: 2024-03-01

## 2024-03-01 LAB
ANA SER QL IF: NEGATIVE
IGA SERPL-MCNC: 469 MG/DL (ref 84–499)
IGM SERPL-MCNC: 58 MG/DL (ref 35–242)
MITOCHONDRIA M2 IGG SER-ACNC: 1.2 U/ML
TTG IGA SER-ACNC: 2.4 U/ML
TTG IGG SER-ACNC: 1.3 U/ML

## 2024-03-02 LAB
HBV CORE AB SERPL QL IA: REACTIVE
HBV SURFACE AB SERPL IA-ACNC: 57.8 M[IU]/ML
HBV SURFACE AB SERPL IA-ACNC: REACTIVE M[IU]/ML
HBV SURFACE AG SERPL QL IA: NONREACTIVE
HCV RNA SERPL NAA+PROBE-ACNC: ABNORMAL IU/ML
HCV RNA SERPL NAA+PROBE-LOG IU: 4.2 {LOG_IU}/ML
SMA IGG SER-ACNC: 15 UNITS

## 2024-03-04 ENCOUNTER — ANCILLARY PROCEDURE (OUTPATIENT)
Dept: ULTRASOUND IMAGING | Facility: CLINIC | Age: 49
End: 2024-03-04
Attending: SURGERY
Payer: COMMERCIAL

## 2024-03-04 DIAGNOSIS — T82.898A PROBLEM WITH DIALYSIS ACCESS, INITIAL ENCOUNTER (H): ICD-10-CM

## 2024-03-04 DIAGNOSIS — N18.6 ESRD (END STAGE RENAL DISEASE) ON DIALYSIS (H): ICD-10-CM

## 2024-03-04 DIAGNOSIS — Z99.2 ESRD (END STAGE RENAL DISEASE) ON DIALYSIS (H): ICD-10-CM

## 2024-03-04 LAB — RAD FLAG-ADDENDUM: ABNORMAL

## 2024-03-04 PROCEDURE — 93970 EXTREMITY STUDY: CPT | Performed by: RADIOLOGY

## 2024-03-05 ENCOUNTER — TELEPHONE (OUTPATIENT)
Dept: GASTROENTEROLOGY | Facility: CLINIC | Age: 49
End: 2024-03-05

## 2024-03-05 LAB — HCV GENTYP SERPL NAA+PROBE: NORMAL

## 2024-03-06 DIAGNOSIS — H10.9 BACTERIAL CONJUNCTIVITIS: Primary | ICD-10-CM

## 2024-03-06 RX ORDER — POLYMYXIN B SULFATE AND TRIMETHOPRIM 1; 10000 MG/ML; [USP'U]/ML
1-2 SOLUTION OPHTHALMIC EVERY 6 HOURS
Qty: 10 ML | Refills: 0 | Status: SHIPPED | OUTPATIENT
Start: 2024-03-06

## 2024-03-12 ENCOUNTER — TELEPHONE (OUTPATIENT)
Dept: FAMILY MEDICINE | Facility: CLINIC | Age: 49
End: 2024-03-12

## 2024-03-12 DIAGNOSIS — R79.89 ABNORMAL LFTS: ICD-10-CM

## 2024-03-12 DIAGNOSIS — B18.2 CHRONIC HEPATITIS C WITHOUT HEPATIC COMA (H): Primary | ICD-10-CM

## 2024-03-12 PROCEDURE — 91200 LIVER ELASTOGRAPHY: CPT | Mod: 26 | Performed by: PHYSICIAN ASSISTANT

## 2024-03-12 NOTE — TELEPHONE ENCOUNTER
Patient needs a face to face visit with whomever he considers to be his primary care provider-  same day or new patient ok

## 2024-03-12 NOTE — TELEPHONE ENCOUNTER
----- Message from Tatiana Foster RN sent at 3/11/2024  3:17 PM CDT -----  [place],    Mr Bingham had thyroid nodules noted on his recent vascular imaging. Would primary care be able to follow-up please? Thank you. Please let me know if we can help further.    SEGUNDO Alatorre, RN  RN Care Coordinator  [facility] - [facility] phone: [phone]  Fax: [phone]

## 2024-03-19 ENCOUNTER — OFFICE VISIT (OUTPATIENT)
Dept: FAMILY MEDICINE | Facility: CLINIC | Age: 49
End: 2024-03-19
Payer: COMMERCIAL

## 2024-03-19 VITALS
HEIGHT: 67 IN | HEART RATE: 59 BPM | WEIGHT: 113.5 LBS | OXYGEN SATURATION: 99 % | BODY MASS INDEX: 17.81 KG/M2 | DIASTOLIC BLOOD PRESSURE: 86 MMHG | SYSTOLIC BLOOD PRESSURE: 131 MMHG | TEMPERATURE: 97.4 F | RESPIRATION RATE: 18 BRPM

## 2024-03-19 DIAGNOSIS — Z99.2 ESRD (END STAGE RENAL DISEASE) ON DIALYSIS (H): ICD-10-CM

## 2024-03-19 DIAGNOSIS — Z13.220 SCREENING FOR HYPERLIPIDEMIA: ICD-10-CM

## 2024-03-19 DIAGNOSIS — N18.6 ESRD (END STAGE RENAL DISEASE) ON DIALYSIS (H): ICD-10-CM

## 2024-03-19 DIAGNOSIS — E04.1 THYROID NODULE: Primary | ICD-10-CM

## 2024-03-19 DIAGNOSIS — Z12.11 SCREEN FOR COLON CANCER: ICD-10-CM

## 2024-03-19 PROCEDURE — 80061 LIPID PANEL: CPT | Performed by: PHYSICIAN ASSISTANT

## 2024-03-19 PROCEDURE — 99214 OFFICE O/P EST MOD 30 MIN: CPT | Mod: 25 | Performed by: PHYSICIAN ASSISTANT

## 2024-03-19 PROCEDURE — 90471 IMMUNIZATION ADMIN: CPT | Performed by: PHYSICIAN ASSISTANT

## 2024-03-19 PROCEDURE — 86800 THYROGLOBULIN ANTIBODY: CPT | Performed by: PHYSICIAN ASSISTANT

## 2024-03-19 PROCEDURE — 90715 TDAP VACCINE 7 YRS/> IM: CPT | Performed by: PHYSICIAN ASSISTANT

## 2024-03-19 PROCEDURE — 84443 ASSAY THYROID STIM HORMONE: CPT | Performed by: PHYSICIAN ASSISTANT

## 2024-03-19 PROCEDURE — 84481 FREE ASSAY (FT-3): CPT | Performed by: PHYSICIAN ASSISTANT

## 2024-03-19 PROCEDURE — 90472 IMMUNIZATION ADMIN EACH ADD: CPT | Performed by: PHYSICIAN ASSISTANT

## 2024-03-19 PROCEDURE — 86376 MICROSOMAL ANTIBODY EACH: CPT | Performed by: PHYSICIAN ASSISTANT

## 2024-03-19 PROCEDURE — 90750 HZV VACC RECOMBINANT IM: CPT | Performed by: PHYSICIAN ASSISTANT

## 2024-03-19 PROCEDURE — 36415 COLL VENOUS BLD VENIPUNCTURE: CPT | Performed by: PHYSICIAN ASSISTANT

## 2024-03-19 ASSESSMENT — PAIN SCALES - GENERAL: PAINLEVEL: NO PAIN (0)

## 2024-03-19 NOTE — PROGRESS NOTES
Prior to immunization administration, verified patients identity using patient s name and date of birth. Please see Immunization Activity for additional information.     Screening Questionnaire for Adult Immunization    Are you sick today?   No   Do you have allergies to medications, food, a vaccine component or latex?   No   Have you ever had a serious reaction after receiving a vaccination?   No   Do you have a long-term health problem with heart, lung, kidney, or metabolic disease (e.g., diabetes), asthma, a blood disorder, no spleen, complement component deficiency, a cochlear implant, or a spinal fluid leak?  Are you on long-term aspirin therapy?   Yes   Do you have cancer, leukemia, HIV/AIDS, or any other immune system problem?   No   Do you have a parent, brother, or sister with an immune system problem?   No   In the past 3 months, have you taken medications that affect  your immune system, such as prednisone, other steroids, or anticancer drugs; drugs for the treatment of rheumatoid arthritis, Crohn s disease, or psoriasis; or have you had radiation treatments?   No   Have you had a seizure, or a brain or other nervous system problem?   Yes   During the past year, have you received a transfusion of blood or blood    products, or been given immune (gamma) globulin or antiviral drug?   No   For women: Are you pregnant or is there a chance you could become       pregnant during the next month?   No   Have you received any vaccinations in the past 4 weeks?   No     Immunization questionnaire was positive for at least one answer.  Notified Provider.      Patient instructed to remain in clinic for 15 minutes afterwards, and to report any adverse reactions.     Screening performed by Khalida Adam MA on 3/19/2024 at 11:03 AM.

## 2024-03-19 NOTE — PROGRESS NOTES
Assessment & Plan     Thyroid nodule  Will obtain thyroid guided ultrasound- may need biopsy in future.  Labs to rule out thyroid disease as well.   - US Thyroid  - TSH with free T4 reflex  - ANTI THYROGLOBULIN ANTIBODY  - THYROID PEROXIDASE ANTIBODY  - T3 Free  - T3, total  - TSH with free T4 reflex  - ANTI THYROGLOBULIN ANTIBODY  - THYROID PEROXIDASE ANTIBODY  - T3 Free  - T3, total    ESRD (end stage renal disease) on dialysis ([facility])  Followed by nephrology    Screen for colon cancer  Agreeable to colonoscopy   - Colonoscopy Screening  Referral    Screening for hyperlipidemia    - Lipid panel reflex to direct LDL Non-fasting  - Lipid panel reflex to direct LDL Non-fasting      Ordering of each unique test        Nicotine/Tobacco Cessation  He reports that he has been smoking cigarettes. He started smoking about 29 years ago. He has a 15 pack-year smoking history. He has been exposed to tobacco smoke. He has never used smokeless tobacco.  Nicotine/Tobacco Cessation Plan  Information offered: Patient not interested at this time        Patient Instructions   Schedule thyroid ultrasound at [facility] ([phone]) formerly called [facility].    I will send a letter with lab results unless abnormal and then call  Schedule shingrix in 2-6 months for MA (MEDICAL ASSISTANT) visit for second shingles vaccine    Veronica Turner is a 48 year old, presenting for the following health issues:  Follow Up (Imaging results)      3/19/2024    10:36 AM   Additional Questions   Roomed by Monse   Accompanied by self         3/19/2024    10:36 AM   Patient Reported Additional Medications   Patient reports taking the following new medications No     History of Present Illness       Reason for visit:  Imaging results    He eats 0-1 servings of fruits and vegetables daily.He consumes 0 sweetened beverage(s) daily.He exercises with enough effort to increase his heart rate 20 to 29 minutes per  "day.  He exercises with enough effort to increase his heart rate 7 days per week.   He is taking medications regularly.     Patient with history of end stage renal disease on dialysis relate to hypertension , secondary renal hyperparathyroidism, hepatitis C and smoker presents for review of ultrasound. Patient had a thyroid nodule noted on his recent vascular imaging. No weight changes.  No diarrhea or constipation  Recently had a lot of acne but no other skin changes. Feels well. Has dialysis three times a week.  Denies fatigue or lack of energy. Has never had thyroid disease   No family members with history of thyroid disease  Has not had colon cancer screening- agreeable to colonoscopy             Review of Systems  Constitutional, neuro, ENT, endocrine, pulmonary, cardiac, gastrointestinal, genitourinary, musculoskeletal, integument and psychiatric systems are negative, except as otherwise noted.      Objective    /86 (BP Location: Right arm, Patient Position: Sitting, Cuff Size: Adult Small)   Pulse 59   Temp 97.4  F (36.3  C) (Temporal)   Resp 18   Ht 1.689 m (5' 6.5\")   Wt 51.5 kg (113 lb 8 oz)   SpO2 99%   BMI 18.04 kg/m    Body mass index is 18.04 kg/m .  Physical Exam   GENERAL: alert, no distress, frail, and appears older than stated age  NECK: no adenopathy, no asymmetry, masses, or scars  RESP: lungs clear to auscultation - no rales, rhonchi or wheezes  CV: regular rate and rhythm, normal S1 S2, no S3 or S4, no murmur, click or rub, no peripheral edema  ABDOMEN: soft, nontender, no hepatosplenomegaly, no masses and bowel sounds normal  MS: no gross musculoskeletal defects noted, no edema  PSYCH: mentation appears normal, affect normal/bright, judgement and insight intact, and appearance well groomed            Signed Electronically by: Maddi Nation PA-C    "

## 2024-03-19 NOTE — PATIENT INSTRUCTIONS
Schedule thyroid ultrasound at [facility] ([phone]) formerly called [facility].    I will send a letter with lab results unless abnormal and then call  Schedule shingrix in 2-6 months for MA (MEDICAL ASSISTANT) visit for second shingles vaccine

## 2024-03-20 ENCOUNTER — TELEPHONE (OUTPATIENT)
Dept: FAMILY MEDICINE | Facility: CLINIC | Age: 49
End: 2024-03-20

## 2024-03-20 LAB
CHOLEST SERPL-MCNC: 187 MG/DL
FASTING STATUS PATIENT QL REPORTED: YES
HDLC SERPL-MCNC: 43 MG/DL
LDLC SERPL CALC-MCNC: 120 MG/DL
NONHDLC SERPL-MCNC: 144 MG/DL
T3 SERPL-MCNC: 132 NG/DL (ref 85–202)
T3FREE SERPL-MCNC: 3.1 PG/ML (ref 2–4.4)
THYROGLOB AB SERPL IA-ACNC: <20 IU/ML
THYROPEROXIDASE AB SERPL-ACNC: <10 IU/ML
TRIGL SERPL-MCNC: 119 MG/DL
TSH SERPL DL<=0.005 MIU/L-ACNC: 1.45 UIU/ML (ref 0.3–4.2)

## 2024-03-20 NOTE — TELEPHONE ENCOUNTER
"Called patient via "[employer] . Notified him of provider's recommendations below. Patient verbalized understanding. Patient reports that he does not feel comfortable taking Crestor at this time due to the possible affects it can have on the liver. Patient reported that \"his liver is already not good\".     Routing to provider for any further recommendations/ medication alternatives.     MICHELLE ThomasN, RN   [facility]      ----- Message from Maddi Nation PA-C sent at 3/20/2024 12:48 PM CDT -----  Please call with  and advise that all thyroid lab test were normal  Cholesterol is much improved from 3 yrs ago but we should consider cholesterol medication given his risk factors.  We would start crestor 10 mg and recheck labs in 2 months.  Can cause muscle aches or pains and affect the liver-let us know his thoughts    The 10-year ASCVD risk score (Freddie ASTUDILLO, et al., 2019) is: 9.9%    Values used to calculate the score:      Age: 48 years      Sex: Male      Is Non- : No      Diabetic: No      Tobacco smoker: Yes      Systolic Blood Pressure: 131 mmHg      Is BP treated: Yes      HDL Cholesterol: 43 mg/dL      Total Cholesterol: 187 mg/dL   Please call or MyChart my office with any questions or concerns.       "

## 2024-03-20 NOTE — RESULT ENCOUNTER NOTE
Please call with  and advise that all thyroid lab test were normal  Cholesterol is much improved from 3 yrs ago but we should consider cholesterol medication given his risk factors.  We would start crestor 10 mg and recheck labs in 2 months.  Can cause muscle aches or pains and affect the liver-let us know his thoughts    The 10-year ASCVD risk score (Freddie ASTUDILLO, et al., 2019) is: 9.9%    Values used to calculate the score:      Age: 48 years      Sex: Male      Is Non- : No      Diabetic: No      Tobacco smoker: Yes      Systolic Blood Pressure: 131 mmHg      Is BP treated: Yes      HDL Cholesterol: 43 mg/dL      Total Cholesterol: 187 mg/dL   Please call or MyChart my office with any questions or concerns.

## 2024-03-21 ENCOUNTER — ANCILLARY PROCEDURE (OUTPATIENT)
Dept: ULTRASOUND IMAGING | Facility: CLINIC | Age: 49
End: 2024-03-21
Attending: PHYSICIAN ASSISTANT
Payer: COMMERCIAL

## 2024-03-21 DIAGNOSIS — E04.1 THYROID NODULE: ICD-10-CM

## 2024-03-21 DIAGNOSIS — E04.1 THYROID NODULE: Primary | ICD-10-CM

## 2024-03-21 PROCEDURE — 76536 US EXAM OF HEAD AND NECK: CPT

## 2024-03-21 NOTE — RESULT ENCOUNTER NOTE
Please call and advise that ultrasound of thyroid showed one thyroid nodule.  It DID NOT SHOW a cancer.  Recommend repeating thyroid ultrasound in one year to make sure not growing .

## 2024-03-22 ENCOUNTER — TELEPHONE (OUTPATIENT)
Dept: GASTROENTEROLOGY | Facility: CLINIC | Age: 49
End: 2024-03-22

## 2024-03-22 DIAGNOSIS — R76.8 HEPATITIS B CORE ANTIBODY POSITIVE: Primary | ICD-10-CM

## 2024-03-22 DIAGNOSIS — B18.2 CHRONIC HEPATITIS C WITHOUT HEPATIC COMA (H): ICD-10-CM

## 2024-03-22 NOTE — TELEPHONE ENCOUNTER
Called patient per Pema Andrea PA-C regarding updates.  used.    Informed patient:  -Fibroscan showed fibrosis stage 0-1 and steatosis Grade S0   -Repeat labs needed for hepatitis B as previous lab results may have been false positive (scheduled with pt)  -Hepatitis C medication (Mavyret x 8 weeks) sent     Patient understanding and had no further questions.    MICHELLE MooneyN, RN, PHN  Hepatology Clinic  [facility]  [facility]

## 2024-03-25 ENCOUNTER — TELEPHONE (OUTPATIENT)
Dept: GASTROENTEROLOGY | Facility: CLINIC | Age: 49
End: 2024-03-25

## 2024-03-25 NOTE — TELEPHONE ENCOUNTER
"Endoscopy Scheduling Screen    Have you had a positive Covid test in the last 14 days?  No    What is your communication preference for Instructions and/or Bowel Prep?   Mail/USPS    What insurance is in the chart?  Other:  [facility]    Ordering/Referring Provider:     TELLO BARNES      (If ordering provider performs procedure, schedule with ordering provider unless otherwise instructed. )    BMI: Estimated body mass index is 18.04 kg/m  as calculated from the following:    Height as of 3/19/24: 1.689 m (5' 6.5\").    Weight as of 3/19/24: 51.5 kg (113 lb 8 oz).     Sedation Ordered  moderate sedation.   If patient BMI > 50 do not schedule in [facility].    If patient BMI > 45 do not schedule at [facility].    Are you taking methadone or Suboxone?  No    Have you had difficulties, pain, or discomfort during past endoscopy procedures?  No    Are you taking any prescription medications for pain 3 or more times per week?   NO, No RN review required.    Do you have a history of malignant hyperthermia?  No    (Females) Are you currently pregnant?        Have you been diagnosed or told you have pulmonary hypertension?   No    Do you have an LVAD?  No    Have you been told you have moderate to severe sleep apnea?  No    Have you been told you have COPD, asthma, or any other lung disease?  No    Do you have any heart conditions?  No     Have you ever had or are you waiting for an organ transplant?  Yes. Have you had or are on on the wait list for a heart/lung transplant? No, may schedule at all facilities except [facility]    Have you had a stroke or transient ischemic attack (TIA aka \"mini stroke\" in the last 6 months?   No    Have you been diagnosed with or been told you have cirrhosis of the liver?   No    Are you currently on dialysis?   Yes (Hospital Only)    Do you need assistance transferring?   No    BMI: Estimated body mass index is 18.04 kg/m  as calculated from the following:    Height as of 3/19/24: 1.689 m (5' 6.5\").    Weight " as of 3/19/24: 51.5 kg (113 lb 8 oz).     Is patients BMI > 40 and scheduling location [facility]?  No    Do you take an injectable medication for weight loss or diabetes (excluding insulin)?  No    Do you take the medication Naltrexone?  No    Do you take blood thinners?  No       Prep   Are you currently on dialysis or do you have chronic kidney disease?  Yes ([facility]ol[facility] Prep)    Do you have a diagnosis of diabetes?  No    Do you have a diagnosis of cystic fibrosis (CF)?  No    On a regular basis do you go 3 -5 days between bowel movements?  No    BMI > 40?  No    Preferred Pharmacy:    [facility] [address] [place] [address] - [address] [facility]  [address] [facility] [address]  Phone: [phone] Fax: [phone] Alternate Fax: [phone]    Final Scheduling Details     Procedure scheduled  Colonoscopy    Surgeon:       Date of procedure: NO BODY TO DRIVE HIM       Pre-OP / PAC:       Location       Sedation          Patient Reminders:   You will receive a call from a Nurse to review instructions and health history.  This assessment must be completed prior to your procedure.  Failure to complete the Nurse assessment may result in the procedure being cancelled.      On the day of your procedure, please designate an adult(s) who can drive you home stay with you for the next 24 hours. The medicines used in the exam will make you sleepy. You will not be able to drive.      You cannot take public transportation, ride share services, or non-medical taxi service without a responsible caregiver.  Medical transport services are allowed with the requirement that a responsible caregiver will receive you at your destination.  We require that drivers and caregivers are confirmed prior to your procedure.

## 2024-04-01 ENCOUNTER — TELEPHONE (OUTPATIENT)
Dept: VASCULAR SURGERY | Facility: CLINIC | Age: 49
End: 2024-04-01

## 2024-04-01 ENCOUNTER — TELEPHONE (OUTPATIENT)
Dept: GASTROENTEROLOGY | Facility: CLINIC | Age: 49
End: 2024-04-01

## 2024-04-01 NOTE — TELEPHONE ENCOUNTER
The pt NO SHOWED on 3/4/24 and on 3/24/24 [employer] message sent to ordering provider Micheline Dudley.     Appointment type: [facility] pt   Provider:   Return date: Next Available  Specialty phone number: [phone]  Additional appointment(s) needed: MRI   Additonal Notes: Referral has been cancelled & No Show letter has been sent to the pt.  Ryan Salazar on 4/1/2024 at 3:16 PM

## 2024-04-01 NOTE — TELEPHONE ENCOUNTER
Patient's coverage termed as of 1/31/24. Spoke to Jesica and provider is Ok with trying to pursue free drug. Faxed PAP form to clinic for completion.

## 2024-04-02 ENCOUNTER — TELEPHONE (OUTPATIENT)
Dept: GASTROENTEROLOGY | Facility: CLINIC | Age: 49
End: 2024-04-02

## 2024-04-02 NOTE — TELEPHONE ENCOUNTER
Called patient to discuss [employer] application for Mavyret medication for hepatitis C treatment.  used. Discussed at length with patient.    Patient will use 2024 W-2 form for application.    Patient will stop by [place] location on 4/4 at 1:30 PM and drop of W-2 and sign [employer] application form.     SEGUNDO Mooney, RN, PHN  Hepatology Clinic  [facility]  [facility]

## 2024-04-03 DIAGNOSIS — I10 HYPERTENSION GOAL BP (BLOOD PRESSURE) < 140/90: ICD-10-CM

## 2024-04-03 DIAGNOSIS — N17.9 ACUTE RENAL FAILURE, UNSPECIFIED ACUTE RENAL FAILURE TYPE (H): ICD-10-CM

## 2024-04-03 RX ORDER — CARVEDILOL 12.5 MG/1
TABLET ORAL
Qty: 180 TABLET | Refills: 0 | OUTPATIENT
Start: 2024-04-03

## 2024-04-03 NOTE — TELEPHONE ENCOUNTER
Refill request faxed to [facility] at [phone].  SELAM Montero Care Coordinator  Nephrology

## 2024-04-07 DIAGNOSIS — N17.9 ACUTE RENAL FAILURE, UNSPECIFIED ACUTE RENAL FAILURE TYPE (H): ICD-10-CM

## 2024-04-07 DIAGNOSIS — I10 HYPERTENSION GOAL BP (BLOOD PRESSURE) < 140/90: ICD-10-CM

## 2024-04-08 ENCOUNTER — APPOINTMENT (OUTPATIENT)
Dept: INTERPRETER SERVICES | Facility: CLINIC | Age: 49
End: 2024-04-08

## 2024-04-08 NOTE — TELEPHONE ENCOUNTER
Patient did not stop by the [place] office last week. [employer] application will be mailed to patient. Patient will need to sign and include copy of W-2 form and drop off at [place] location.    VM not set up. Attempted x 2.     Mailed to patient anyway.    SEGUNDO Mooney, RN, PHN  Hepatology Clinic  [facility]  [facility]

## 2024-04-08 NOTE — TELEPHONE ENCOUNTER
"This LPN called [facility] [phone] and spoke with \"Kunal\".  Discussed Pt is seen at [facility] in [place], [place].  Fax [phone].  He will send the refill request for Carvedilol to [facility].    Yenni Leavitt LPN    "

## 2024-04-09 ENCOUNTER — APPOINTMENT (OUTPATIENT)
Dept: INTERPRETER SERVICES | Facility: CLINIC | Age: 49
End: 2024-04-09

## 2024-04-09 RX ORDER — CARVEDILOL 12.5 MG/1
TABLET ORAL
Qty: 180 TABLET | Refills: 0 | OUTPATIENT
Start: 2024-04-09

## 2024-04-09 NOTE — TELEPHONE ENCOUNTER
Discussed below with patient. Patient understanding it is his responsibility to finish [employer] application in order to move forward with hepatitis C treatment. Patient to sign his name on [employer] application and give copy of W-2 form to [facility] location.     SEGUNDO Mooney, RN, PHN  Hepatology Clinic  [facility]  [facility]

## 2024-04-11 DIAGNOSIS — I10 HYPERTENSION GOAL BP (BLOOD PRESSURE) < 140/90: ICD-10-CM

## 2024-04-11 DIAGNOSIS — N17.9 ACUTE RENAL FAILURE, UNSPECIFIED ACUTE RENAL FAILURE TYPE (H): ICD-10-CM

## 2024-04-11 RX ORDER — CARVEDILOL 12.5 MG/1
12.5 TABLET ORAL 2 TIMES DAILY WITH MEALS
Qty: 180 TABLET | Refills: 3 | Status: SHIPPED | OUTPATIENT
Start: 2024-04-11

## 2024-10-21 DIAGNOSIS — I10 HYPERTENSION GOAL BP (BLOOD PRESSURE) < 140/90: ICD-10-CM

## 2024-10-21 DIAGNOSIS — N17.9 ACUTE RENAL FAILURE, UNSPECIFIED ACUTE RENAL FAILURE TYPE (H): ICD-10-CM

## 2024-10-21 RX ORDER — LISINOPRIL 20 MG/1
20 TABLET ORAL 2 TIMES DAILY
Qty: 180 TABLET | Refills: 0 | Status: SHIPPED | OUTPATIENT
Start: 2024-10-21

## 2025-01-08 DIAGNOSIS — I10 HYPERTENSION GOAL BP (BLOOD PRESSURE) < 140/90: ICD-10-CM

## 2025-01-08 DIAGNOSIS — N17.9 ACUTE RENAL FAILURE, UNSPECIFIED ACUTE RENAL FAILURE TYPE: ICD-10-CM

## 2025-01-08 RX ORDER — AMLODIPINE BESYLATE 5 MG/1
5 TABLET ORAL DAILY
Qty: 90 TABLET | Refills: 3 | Status: SHIPPED | OUTPATIENT
Start: 2025-01-08

## 2025-01-08 RX ORDER — CARVEDILOL 12.5 MG/1
12.5 TABLET ORAL 2 TIMES DAILY WITH MEALS
Qty: 180 TABLET | Refills: 3 | Status: SHIPPED | OUTPATIENT
Start: 2025-01-08

## 2025-01-08 RX ORDER — LISINOPRIL 20 MG/1
20 TABLET ORAL DAILY
Qty: 90 TABLET | Refills: 3 | Status: SHIPPED | OUTPATIENT
Start: 2025-01-08

## 2025-01-09 ENCOUNTER — VIRTUAL VISIT (OUTPATIENT)
Dept: INTERPRETER SERVICES | Facility: CLINIC | Age: 50
End: 2025-01-09

## 2025-01-27 ENCOUNTER — TELEPHONE (OUTPATIENT)
Dept: NEPHROLOGY | Facility: CLINIC | Age: 50
End: 2025-01-27

## 2025-01-27 NOTE — TELEPHONE ENCOUNTER
01/27/25     I received notification from SELAM Clark at [facility] that Johnny has not dialyzed at the unit since 1/15/25. I called Johnny with the help of a [employer] . Johnny reports that he has not had transportation so has not gone to dialysis. I explained to him that not dialyzing puts his life at risk. I am concerned about the length of time he has gone without dialysis. It was my recommendation that he go to the ED at this time. He refuses. I explained at length the risk of death due to hyperkalemia if he does not dialyze and he expressed understanding. He reported to me that he would come to dialysis tomorrow.     I got off of the phone and spoke to the dialysis RN. She reports that he could come to the unit at 630 AM.     I called the patient back again with the help of the [employer] . I asked him to be at dialysis at 630 AM Tuesday 1/28. He reports that he does not have family or friends that can help him get to dialysis tomorrow. I explained to him that getting dialysis is a necessity and that if he cannot get to the dialysis unit, he needs to go to the ED even if that requires going by ambulance. He expressed understanding.    After getting off of the phone with Johnny, I called the  for [facility]. I asked her to look into transportation options for Johnny tomorrow as a necessity- she plans to contact her manager in the AM and see what can be done for tomorrow's run. I also stressed to her the issue at hand and what next steps could be taken to help him. She reports that she has attempted to help him numerous times with getting his medicaid updated but he has not completed what he needs to do - this has all been done with the help of an  per her report. Without medicaid, he is limited in transportation options. She is going to discuss next steps with her manager but she has explained to Johnny that it is ultimately his responsibility to have transportation  arranged. Next steps: she will contact her manager in the AM regarding transportation specifically for 1/28. She will also discuss what longer term options we have to assist Johnny with his transportation issues.     Micheline Dudely, DO

## 2025-05-22 ENCOUNTER — TELEPHONE (OUTPATIENT)
Dept: NEPHROLOGY | Facility: CLINIC | Age: 50
End: 2025-05-22

## 2025-05-22 NOTE — TELEPHONE ENCOUNTER
Spoke to [facility] team, Octavia. Given patient has not attended dialysis, it is reported that his prior chair time had been given up. My understanding is that patient will need to be re-admitted through [facility] Admissions team. Advised Octavia to go this route and she mentioned that she has a team member working on this.

## 2025-05-22 NOTE — TELEPHONE ENCOUNTER
Health Call Center    Phone Message    May a detailed message be left on voicemail: yes     Reason for Call: Other: Octavia RN  at [facility] calling because patient was hospitalized at their facility and they are trying to get his dialysis reestablished with [facility]. Octavia did speak with dialysis and they could not just schedule and he needs ot be reestablished with Nephrology before they can schedule. Please call Octavia at [facility] she will be available until 4:30pm Thursday and Friday. Patient is due to be discharged Friday.      Action Taken: Other: [facility]    Travel Screening: Not Applicable     Date of Service:

## (undated) DEVICE — LINEN TOWEL PACK X30 5481

## (undated) DEVICE — SPONGE SURGIFOAM 100 1974

## (undated) DEVICE — CATH FOGARTY EMBOLECTOMY 3FR 80CM LATEX 120803FP

## (undated) DEVICE — SYR 01ML LL W/O NDL LATEX FREE 309628

## (undated) DEVICE — CLIP HORIZON SM RED WIDE SLOT 001201

## (undated) DEVICE — SYR 01ML 27GA 0.5" NDL TBC 309623

## (undated) DEVICE — LINEN TOWEL PACK X6 WHITE 5487

## (undated) DEVICE — DRAPE IOBAN INCISE 23X17" 6650EZ

## (undated) DEVICE — GLOVE PROTEXIS W/NEU-THERA 6.5  2D73TE65

## (undated) DEVICE — ESU ELEC BLADE 2.75" COATED/INSULATED E1455

## (undated) DEVICE — SU SILK 3-0 TIE 12X30" A304H

## (undated) DEVICE — SPONGE KITTNER 30-101

## (undated) DEVICE — SU VICRYL 3-0 SH 27" UND J416H

## (undated) DEVICE — Device

## (undated) DEVICE — DRAPE EXTREMITY UPPER 120X76" 29414

## (undated) DEVICE — SU MONOCRYL 4-0 PS-2 27" UND Y426H

## (undated) DEVICE — INSERT FOGARTY 33MM TRACTION HYDRAJAW HYDRA33

## (undated) DEVICE — ESU PENCIL SMOKE EVAC W/ROCKER SWITCH 0703-047-000

## (undated) DEVICE — SU SILK 2-0 TIE 12X30" A305H

## (undated) DEVICE — COVER NEOPROBE SOFTFLEX 5X96" W/BANDS 20-PC596

## (undated) DEVICE — SU SILK 0 TIE 6X30" A306H

## (undated) DEVICE — DRAPE STOCKINETTE IMPERVIOUS 10" 21048

## (undated) DEVICE — PACK AV FISTULA

## (undated) DEVICE — ESU GROUND PAD ADULT W/CORD E7507

## (undated) DEVICE — CUP AND LID 2PK 2OZ STERILE  SSK9006A

## (undated) DEVICE — NDL BUTTERFLY 25GA .75" 367298

## (undated) DEVICE — BNDG ELASTIC 4"X5YDS STERILE 6611-4S

## (undated) DEVICE — VESSEL LOOPS DEV-O-LOOP WHITE MINI 31145728

## (undated) DEVICE — PREP CHLORAPREP 26ML TINTED HI-LITE ORANGE 930815

## (undated) DEVICE — ADH SKIN CLOSURE PREMIERPRO EXOFIN 1.0ML 3470

## (undated) DEVICE — SU SILK 5-0 TIE 12X30" A302H

## (undated) DEVICE — SU PROLENE 6-0 BV-1DA 18" 8709H

## (undated) RX ORDER — FENTANYL CITRATE 50 UG/ML
INJECTION, SOLUTION INTRAMUSCULAR; INTRAVENOUS
Status: DISPENSED
Start: 2022-04-21

## (undated) RX ORDER — DEXAMETHASONE SODIUM PHOSPHATE 4 MG/ML
INJECTION, SOLUTION INTRA-ARTICULAR; INTRALESIONAL; INTRAMUSCULAR; INTRAVENOUS; SOFT TISSUE
Status: DISPENSED
Start: 2022-04-21

## (undated) RX ORDER — ONDANSETRON 2 MG/ML
INJECTION INTRAMUSCULAR; INTRAVENOUS
Status: DISPENSED
Start: 2022-04-21

## (undated) RX ORDER — HEPARIN SODIUM 200 [USP'U]/100ML
INJECTION, SOLUTION INTRAVENOUS
Status: DISPENSED
Start: 2021-05-10

## (undated) RX ORDER — LIDOCAINE HYDROCHLORIDE 20 MG/ML
INJECTION, SOLUTION EPIDURAL; INFILTRATION; INTRACAUDAL; PERINEURAL
Status: DISPENSED
Start: 2022-04-21

## (undated) RX ORDER — HEPARIN SODIUM 1000 [USP'U]/ML
INJECTION, SOLUTION INTRAVENOUS; SUBCUTANEOUS
Status: DISPENSED
Start: 2022-04-21

## (undated) RX ORDER — LIDOCAINE HYDROCHLORIDE 10 MG/ML
INJECTION, SOLUTION EPIDURAL; INFILTRATION; INTRACAUDAL; PERINEURAL
Status: DISPENSED
Start: 2022-04-21

## (undated) RX ORDER — SODIUM CHLORIDE 9 MG/ML
INJECTION, SOLUTION INTRAVENOUS
Status: DISPENSED
Start: 2022-02-14

## (undated) RX ORDER — FENTANYL CITRATE-0.9 % NACL/PF 10 MCG/ML
PLASTIC BAG, INJECTION (ML) INTRAVENOUS
Status: DISPENSED
Start: 2022-04-21

## (undated) RX ORDER — EPHEDRINE SULFATE 50 MG/ML
INJECTION, SOLUTION INTRAMUSCULAR; INTRAVENOUS; SUBCUTANEOUS
Status: DISPENSED
Start: 2022-04-21

## (undated) RX ORDER — LIDOCAINE HYDROCHLORIDE 10 MG/ML
INJECTION, SOLUTION INFILTRATION; PERINEURAL
Status: DISPENSED
Start: 2021-10-06

## (undated) RX ORDER — HEPARIN SODIUM 1000 [USP'U]/ML
INJECTION, SOLUTION INTRAVENOUS; SUBCUTANEOUS
Status: DISPENSED
Start: 2021-10-06

## (undated) RX ORDER — FENTANYL CITRATE 50 UG/ML
INJECTION, SOLUTION INTRAMUSCULAR; INTRAVENOUS
Status: DISPENSED
Start: 2021-05-10

## (undated) RX ORDER — CEFAZOLIN SODIUM/WATER 2 G/20 ML
SYRINGE (ML) INTRAVENOUS
Status: DISPENSED
Start: 2022-04-21

## (undated) RX ORDER — CEFAZOLIN SODIUM 2 G/100ML
INJECTION, SOLUTION INTRAVENOUS
Status: DISPENSED
Start: 2021-05-10

## (undated) RX ORDER — CEFAZOLIN SODIUM 2 G/100ML
INJECTION, SOLUTION INTRAVENOUS
Status: DISPENSED
Start: 2021-10-06

## (undated) RX ORDER — PROPOFOL 10 MG/ML
INJECTION, EMULSION INTRAVENOUS
Status: DISPENSED
Start: 2022-04-21

## (undated) RX ORDER — HEPARIN SODIUM 1000 [USP'U]/ML
INJECTION, SOLUTION INTRAVENOUS; SUBCUTANEOUS
Status: DISPENSED
Start: 2022-02-14

## (undated) RX ORDER — FENTANYL CITRATE 50 UG/ML
INJECTION, SOLUTION INTRAMUSCULAR; INTRAVENOUS
Status: DISPENSED
Start: 2021-10-06

## (undated) RX ORDER — ACETAMINOPHEN 325 MG/1
TABLET ORAL
Status: DISPENSED
Start: 2022-04-21

## (undated) RX ORDER — LIDOCAINE HYDROCHLORIDE 10 MG/ML
INJECTION, SOLUTION INFILTRATION; PERINEURAL
Status: DISPENSED
Start: 2021-05-10

## (undated) RX ORDER — FENTANYL CITRATE 50 UG/ML
INJECTION, SOLUTION INTRAMUSCULAR; INTRAVENOUS
Status: DISPENSED
Start: 2022-02-14

## (undated) RX ORDER — LIDOCAINE HYDROCHLORIDE 10 MG/ML
INJECTION, SOLUTION EPIDURAL; INFILTRATION; INTRACAUDAL; PERINEURAL
Status: DISPENSED
Start: 2022-02-14